# Patient Record
Sex: MALE | Race: AMERICAN INDIAN OR ALASKA NATIVE | NOT HISPANIC OR LATINO | Employment: OTHER | URBAN - METROPOLITAN AREA
[De-identification: names, ages, dates, MRNs, and addresses within clinical notes are randomized per-mention and may not be internally consistent; named-entity substitution may affect disease eponyms.]

---

## 2024-02-08 ENCOUNTER — TRANSFERRED RECORDS (OUTPATIENT)
Dept: HEALTH INFORMATION MANAGEMENT | Facility: CLINIC | Age: 74
End: 2024-02-08

## 2024-02-10 ENCOUNTER — TRANSFERRED RECORDS (OUTPATIENT)
Dept: HEALTH INFORMATION MANAGEMENT | Facility: CLINIC | Age: 74
End: 2024-02-10

## 2024-02-10 LAB — EJECTION FRACTION: NORMAL %

## 2024-02-11 ENCOUNTER — TRANSFERRED RECORDS (OUTPATIENT)
Dept: HEALTH INFORMATION MANAGEMENT | Facility: CLINIC | Age: 74
End: 2024-02-11

## 2024-02-28 ENCOUNTER — TRANSFERRED RECORDS (OUTPATIENT)
Dept: HEALTH INFORMATION MANAGEMENT | Facility: CLINIC | Age: 74
End: 2024-02-28
Payer: MEDICARE

## 2024-02-29 ENCOUNTER — TRANSFERRED RECORDS (OUTPATIENT)
Dept: HEALTH INFORMATION MANAGEMENT | Facility: CLINIC | Age: 74
End: 2024-02-29
Payer: MEDICARE

## 2024-02-29 LAB
ALT SERPL-CCNC: 13 U/L (ref 16–63)
AST SERPL-CCNC: 18 U/L (ref 15–37)
CREATININE (EXTERNAL): 0.75 MG/DL (ref 0.67–1.17)
GFR ESTIMATED (EXTERNAL): 94 ML/MIN/1.73M2
GLUCOSE (EXTERNAL): 87 MG/DL (ref 74–106)
POTASSIUM (EXTERNAL): 4.2 MMOL/L (ref 3.5–5.5)

## 2024-03-01 ENCOUNTER — TRANSFERRED RECORDS (OUTPATIENT)
Dept: HEALTH INFORMATION MANAGEMENT | Facility: CLINIC | Age: 74
End: 2024-03-01
Payer: MEDICARE

## 2024-03-02 ENCOUNTER — TRANSFERRED RECORDS (OUTPATIENT)
Dept: HEALTH INFORMATION MANAGEMENT | Facility: CLINIC | Age: 74
End: 2024-03-02
Payer: MEDICARE

## 2024-03-02 LAB — EJECTION FRACTION: NORMAL %

## 2024-03-07 ENCOUNTER — TRANSFERRED RECORDS (OUTPATIENT)
Dept: HEALTH INFORMATION MANAGEMENT | Facility: CLINIC | Age: 74
End: 2024-03-07
Payer: MEDICARE

## 2024-03-15 ENCOUNTER — DOCUMENTATION ONLY (OUTPATIENT)
Dept: OTHER | Facility: CLINIC | Age: 74
End: 2024-03-15

## 2024-03-15 ENCOUNTER — OFFICE VISIT (OUTPATIENT)
Dept: FAMILY MEDICINE | Facility: CLINIC | Age: 74
End: 2024-03-15
Payer: MEDICARE

## 2024-03-15 VITALS
OXYGEN SATURATION: 97 % | HEART RATE: 87 BPM | WEIGHT: 120.5 LBS | TEMPERATURE: 97.1 F | BODY MASS INDEX: 17.25 KG/M2 | SYSTOLIC BLOOD PRESSURE: 104 MMHG | DIASTOLIC BLOOD PRESSURE: 62 MMHG | RESPIRATION RATE: 13 BRPM | HEIGHT: 70 IN

## 2024-03-15 DIAGNOSIS — Z12.5 SCREENING FOR PROSTATE CANCER: ICD-10-CM

## 2024-03-15 DIAGNOSIS — K50.918 CROHN'S DISEASE WITH OTHER COMPLICATION, UNSPECIFIED GASTROINTESTINAL TRACT LOCATION (H): ICD-10-CM

## 2024-03-15 DIAGNOSIS — Z99.89 USE OF CANE AS AMBULATORY AID: ICD-10-CM

## 2024-03-15 DIAGNOSIS — R63.6 UNDERWEIGHT: Primary | ICD-10-CM

## 2024-03-15 DIAGNOSIS — S01.81XA FACIAL LACERATION, INITIAL ENCOUNTER: ICD-10-CM

## 2024-03-15 DIAGNOSIS — S42.92XS CLOSED FRACTURE OF LEFT SHOULDER, SEQUELA: ICD-10-CM

## 2024-03-15 DIAGNOSIS — S72.001D CLOSED FRACTURE OF RIGHT HIP WITH ROUTINE HEALING, SUBSEQUENT ENCOUNTER: ICD-10-CM

## 2024-03-15 DIAGNOSIS — R29.6 FALLS FREQUENTLY: ICD-10-CM

## 2024-03-15 DIAGNOSIS — F03.90 DEMENTIA, UNSPECIFIED DEMENTIA SEVERITY, UNSPECIFIED DEMENTIA TYPE, UNSPECIFIED WHETHER BEHAVIORAL, PSYCHOTIC, OR MOOD DISTURBANCE OR ANXIETY (H): ICD-10-CM

## 2024-03-15 DIAGNOSIS — E53.8 VITAMIN B12 DEFICIENCY (NON ANEMIC): ICD-10-CM

## 2024-03-15 DIAGNOSIS — G40.909 SEIZURE DISORDER (H): ICD-10-CM

## 2024-03-15 DIAGNOSIS — D53.9 ANEMIA, MACROCYTIC: ICD-10-CM

## 2024-03-15 LAB
BASOPHILS # BLD AUTO: 0 10E3/UL (ref 0–0.2)
BASOPHILS NFR BLD AUTO: 0 %
EOSINOPHIL # BLD AUTO: 0.1 10E3/UL (ref 0–0.7)
EOSINOPHIL NFR BLD AUTO: 1 %
ERYTHROCYTE [DISTWIDTH] IN BLOOD BY AUTOMATED COUNT: 18.5 % (ref 10–15)
HCT VFR BLD AUTO: 30.9 % (ref 40–53)
HGB BLD-MCNC: 9.5 G/DL (ref 13.3–17.7)
IMM GRANULOCYTES # BLD: 0 10E3/UL
IMM GRANULOCYTES NFR BLD: 0 %
LYMPHOCYTES # BLD AUTO: 1.9 10E3/UL (ref 0.8–5.3)
LYMPHOCYTES NFR BLD AUTO: 32 %
MCH RBC QN AUTO: 32.4 PG (ref 26.5–33)
MCHC RBC AUTO-ENTMCNC: 30.7 G/DL (ref 31.5–36.5)
MCV RBC AUTO: 106 FL (ref 78–100)
MONOCYTES # BLD AUTO: 0.4 10E3/UL (ref 0–1.3)
MONOCYTES NFR BLD AUTO: 6 %
NEUTROPHILS # BLD AUTO: 3.6 10E3/UL (ref 1.6–8.3)
NEUTROPHILS NFR BLD AUTO: 60 %
PLATELET # BLD AUTO: 415 10E3/UL (ref 150–450)
RBC # BLD AUTO: 2.93 10E6/UL (ref 4.4–5.9)
WBC # BLD AUTO: 6 10E3/UL (ref 4–11)

## 2024-03-15 PROCEDURE — 99000 SPECIMEN HANDLING OFFICE-LAB: CPT | Performed by: FAMILY MEDICINE

## 2024-03-15 PROCEDURE — 80053 COMPREHEN METABOLIC PANEL: CPT | Performed by: FAMILY MEDICINE

## 2024-03-15 PROCEDURE — 80164 ASSAY DIPROPYLACETIC ACD TOT: CPT | Mod: 90 | Performed by: FAMILY MEDICINE

## 2024-03-15 PROCEDURE — 99204 OFFICE O/P NEW MOD 45 MIN: CPT | Performed by: FAMILY MEDICINE

## 2024-03-15 PROCEDURE — 80165 DIPROPYLACETIC ACID FREE: CPT | Mod: 90 | Performed by: FAMILY MEDICINE

## 2024-03-15 PROCEDURE — 82607 VITAMIN B-12: CPT | Performed by: FAMILY MEDICINE

## 2024-03-15 PROCEDURE — 85025 COMPLETE CBC W/AUTO DIFF WBC: CPT | Performed by: FAMILY MEDICINE

## 2024-03-15 PROCEDURE — 36415 COLL VENOUS BLD VENIPUNCTURE: CPT | Performed by: FAMILY MEDICINE

## 2024-03-15 NOTE — PROGRESS NOTES
Writer performed wound cares per request from Dr. Crouch. Physical exam showing small laceration/abrasion injuries to right hand fingers. Wound cleansing performed, flexible bandages applied over wounds. Writer instructed pt family in wound cleansing and wrapping, family verbalized understanding. Wound cleansing also performed on pt right eye laceration. Physical exam showing 1.5 inch laceration on edge of right orbit. Pt tolerated wound cares without complaint, discharged home with family.   Other (Free Text): Negative quantiferon gold 12/2022 Note Text (......Xxx Chief Complaint.): This diagnosis correlates with the Render Risk Assessment In Note?: no Detail Level: Zone

## 2024-03-16 LAB
ALBUMIN SERPL BCG-MCNC: 3.5 G/DL (ref 3.5–5.2)
ALP SERPL-CCNC: 58 U/L (ref 40–150)
ALT SERPL W P-5'-P-CCNC: 17 U/L (ref 0–70)
ANION GAP SERPL CALCULATED.3IONS-SCNC: 12 MMOL/L (ref 7–15)
AST SERPL W P-5'-P-CCNC: 27 U/L (ref 0–45)
BILIRUB SERPL-MCNC: 0.3 MG/DL
BUN SERPL-MCNC: 28.8 MG/DL (ref 8–23)
CALCIUM SERPL-MCNC: 9.1 MG/DL (ref 8.8–10.2)
CHLORIDE SERPL-SCNC: 101 MMOL/L (ref 98–107)
CREAT SERPL-MCNC: 0.84 MG/DL (ref 0.67–1.17)
DEPRECATED HCO3 PLAS-SCNC: 24 MMOL/L (ref 22–29)
EGFRCR SERPLBLD CKD-EPI 2021: >90 ML/MIN/1.73M2
GLUCOSE SERPL-MCNC: 99 MG/DL (ref 70–99)
POTASSIUM SERPL-SCNC: 4.8 MMOL/L (ref 3.4–5.3)
PROT SERPL-MCNC: 7.9 G/DL (ref 6.4–8.3)
SODIUM SERPL-SCNC: 137 MMOL/L (ref 135–145)
VALPROATE FREE MFR SERPL: 24 %
VALPROATE FREE SERPL-MCNC: 11 UG/ML
VALPROATE SERPL-MCNC: 46 UG/ML
VIT B12 SERPL-MCNC: 527 PG/ML (ref 232–1245)

## 2024-03-16 NOTE — PROCEDURES
The daughter, her partner and the patient discussed the laceration just lateral to the lateral orbital ridge. It was well opposed and not actively bleeding, and appeared clean. Dermabond would allow healing without lidocaine injection and sutures and bleeding, while resulting in a similar scar.   The patient laid on the bed with his right side down and head on a pillow. The nurse cleaned the wound. Dermabond was applied to the 2 cm wound in three layers. This was well tolerated.

## 2024-03-19 ENCOUNTER — TELEPHONE (OUTPATIENT)
Dept: FAMILY MEDICINE | Facility: CLINIC | Age: 74
End: 2024-03-19
Payer: MEDICARE

## 2024-03-19 DIAGNOSIS — D53.9 ANEMIA, MACROCYTIC: Primary | ICD-10-CM

## 2024-03-19 NOTE — TELEPHONE ENCOUNTER
----- Message from Lyly Crouch MD sent at 3/18/2024  4:57 PM CDT -----  Aside from the hemoglobin, the labs are good.  Continue the current medications.    Spoke with patient daughter who was present at office visit with patient.     Rylee expressed that patient did receive a blood transfusion on March 11th or 12th however was unsure what patients previous Hemoglobin was.   Expressed that medications were reviewed and written down by Dr. Wright. Not updated on medication list at time of call.      Advised message would be sent to Dr. Crouch for review and update.

## 2024-03-20 NOTE — TELEPHONE ENCOUNTER
Attempt 1/3 LVM requesting patient return call to clinic and schedule lab only appt as advised by PCP.

## 2024-03-22 ENCOUNTER — LAB (OUTPATIENT)
Dept: LAB | Facility: CLINIC | Age: 74
End: 2024-03-22
Payer: MEDICARE

## 2024-03-22 DIAGNOSIS — D53.9 ANEMIA, MACROCYTIC: ICD-10-CM

## 2024-03-22 DIAGNOSIS — Z12.5 SCREENING FOR PROSTATE CANCER: ICD-10-CM

## 2024-03-22 DIAGNOSIS — R63.6 UNDERWEIGHT: ICD-10-CM

## 2024-03-22 LAB — HGB BLD-MCNC: 10.4 G/DL (ref 13.3–17.7)

## 2024-03-22 PROCEDURE — 82728 ASSAY OF FERRITIN: CPT

## 2024-03-22 PROCEDURE — 83550 IRON BINDING TEST: CPT

## 2024-03-22 PROCEDURE — G0103 PSA SCREENING: HCPCS

## 2024-03-22 PROCEDURE — 36415 COLL VENOUS BLD VENIPUNCTURE: CPT

## 2024-03-22 PROCEDURE — 83540 ASSAY OF IRON: CPT

## 2024-03-22 PROCEDURE — 85018 HEMOGLOBIN: CPT

## 2024-03-23 LAB
FERRITIN SERPL-MCNC: 535 NG/ML (ref 31–409)
IRON BINDING CAPACITY (ROCHE): 246 UG/DL (ref 240–430)
IRON SATN MFR SERPL: 17 % (ref 15–46)
IRON SERPL-MCNC: 42 UG/DL (ref 61–157)
PSA SERPL DL<=0.01 NG/ML-MCNC: 0.46 NG/ML (ref 0–6.5)

## 2024-03-25 ENCOUNTER — TELEPHONE (OUTPATIENT)
Dept: VASCULAR SURGERY | Facility: CLINIC | Age: 74
End: 2024-03-25
Payer: MEDICARE

## 2024-03-25 DIAGNOSIS — I71.40 AAA (ABDOMINAL AORTIC ANEURYSM) (H): Primary | ICD-10-CM

## 2024-03-25 NOTE — TELEPHONE ENCOUNTER
Vascular Clinic Appointment Request    Imaging needed? Yes. Orders placed.     Visit type: In-person    Provider: Dr. 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    LALY SanchezN, RN  RN Care Coordinator  [facility] phone: [phone]  Fax: [phone]

## 2024-03-25 NOTE — TELEPHONE ENCOUNTER
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

## 2024-03-27 ENCOUNTER — DOCUMENTATION ONLY (OUTPATIENT)
Dept: VASCULAR SURGERY | Facility: CLINIC | Age: 74
End: 2024-03-27
Payer: MEDICARE

## 2024-03-27 NOTE — PROGRESS NOTES
Action 3.27.24    Action Taken Per inindra from [facility]- hospitals called [facility] in [place] for fax number to send imaging request. The number they gave is [phone]. hospitals faxed imaging request and priority overnight shipping label.  Tracking # [id number]      Action 04.01.2024    Action Taken Image received from [facility] via disc, took to 4n to be uploaded to chart.      Action 04/02/24  TARAN 10:33 AM    Action Taken  Imaging disc uploaded. CT CHEST 2/11/24 - unable to upload. Message sent to hospitals.

## 2024-04-02 NOTE — TELEPHONE ENCOUNTER
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

## 2024-04-03 ENCOUNTER — TELEPHONE (OUTPATIENT)
Dept: FAMILY MEDICINE | Facility: CLINIC | Age: 74
End: 2024-04-03
Payer: MEDICARE

## 2024-04-03 DIAGNOSIS — R63.6 UNDERWEIGHT: Primary | ICD-10-CM

## 2024-04-03 DIAGNOSIS — G40.909 SEIZURE DISORDER (H): ICD-10-CM

## 2024-04-03 DIAGNOSIS — R29.6 FALLS FREQUENTLY: ICD-10-CM

## 2024-04-03 RX ORDER — MIDODRINE HYDROCHLORIDE 5 MG/1
15 TABLET ORAL 3 TIMES DAILY
Qty: 270 TABLET | Refills: 11 | Status: ON HOLD | OUTPATIENT
Start: 2024-04-03 | End: 2024-04-11

## 2024-04-03 RX ORDER — ASCORBIC ACID 500 MG
500 TABLET ORAL DAILY
Qty: 90 TABLET | Refills: 1 | Status: SHIPPED | OUTPATIENT
Start: 2024-04-03 | End: 2024-09-08

## 2024-04-03 RX ORDER — LANOLIN ALCOHOL/MO/W.PET/CERES
100 CREAM (GRAM) TOPICAL DAILY
Qty: 90 TABLET | Refills: 3 | Status: SHIPPED | OUTPATIENT
Start: 2024-04-03

## 2024-04-03 RX ORDER — FAMOTIDINE 20 MG
1000 TABLET ORAL DAILY
Qty: 90 CAPSULE | Refills: 3 | Status: SHIPPED | OUTPATIENT
Start: 2024-04-03 | End: 2026-09-20

## 2024-04-03 RX ORDER — DIVALPROEX SODIUM 500 MG/1
500 TABLET, DELAYED RELEASE ORAL 2 TIMES DAILY
Qty: 180 TABLET | Refills: 3 | Status: SHIPPED | OUTPATIENT
Start: 2024-04-03 | End: 2024-04-07

## 2024-04-03 NOTE — TELEPHONE ENCOUNTER
Spoke with daughter to relay that medications have been sent in for refills. Daughter stated that pt is not taking methotrexate. She also wanted to ensure that the medication for the pt's low blood pressure was included in the refills. She was not sure what the medication was called though.

## 2024-04-03 NOTE — TELEPHONE ENCOUNTER
Medication Question or Refill    Contacts         Type Contact Phone/Fax    04/03/2024 12:09 PM CDT Phone (Incoming) Rylee Larios (Emergency Contact) [phone]            What medication are you calling about (include dose and sig)?: Every medication pts daughter gave to provider at last appt.     Preferred Pharmacy:   [id number] - [place], [place] - [address] [facility] [address]  [address]  [place] [address]  Phone: [phone] Fax: [phone]      Controlled Substance Agreement on file:   CSA -- Patient Level:    CSA: None found at the patient level.       Who prescribed the medication?: Previous providers     Do you need a refill? Yes    When did you use the medication last? Unknown     Patient offered an appointment? No    Do you have any questions or concerns?  Pts daughter states that she dropped off a lot of paperwork for pt when they came to last appt. How ever I do not see none of these records updated in our system. Pts daughter included a med list as well and all those need to be refilled       Okay to leave a detailed message?: No at Cell number on file:    Telephone Information:   Mobile [phone]

## 2024-04-06 ENCOUNTER — NURSE TRIAGE (OUTPATIENT)
Dept: NURSING | Facility: CLINIC | Age: 74
End: 2024-04-06
Payer: MEDICARE

## 2024-04-06 NOTE — TELEPHONE ENCOUNTER
Received call from patient stating that they just moved here from [place] and had their first visit with Dr. Shea. He states PCP sent prescriptions over to [facility], but they couldn't find his part D insurance. They applied for it again but don't have it at this time, and he'll be out of the medication this weekend. He is asking if a provider will give an emergency refill of a few pills. Advised him to call pharmacy and ask them to give a few pills of the prescriptions sent to get him through the weekend while they work on his insurance. Also advised him to check GoodRx. Patient had no further questions and plans to follow advice.    Reason for Disposition   Caller has medicine question only, adult not sick, AND triager answers question    Protocols used: Medication Question Call-A-

## 2024-04-07 ENCOUNTER — APPOINTMENT (OUTPATIENT)
Dept: CT IMAGING | Facility: CLINIC | Age: 74
DRG: 871 | End: 2024-04-07
Attending: EMERGENCY MEDICINE
Payer: MEDICARE

## 2024-04-07 ENCOUNTER — HOSPITAL ENCOUNTER (INPATIENT)
Facility: CLINIC | Age: 74
LOS: 4 days | Discharge: HOME HEALTH | DRG: 871 | End: 2024-04-11
Attending: EMERGENCY MEDICINE | Admitting: INTERNAL MEDICINE
Payer: MEDICARE

## 2024-04-07 ENCOUNTER — APPOINTMENT (OUTPATIENT)
Dept: GENERAL RADIOLOGY | Facility: CLINIC | Age: 74
DRG: 871 | End: 2024-04-07
Attending: EMERGENCY MEDICINE
Payer: MEDICARE

## 2024-04-07 ENCOUNTER — APPOINTMENT (OUTPATIENT)
Dept: CT IMAGING | Facility: CLINIC | Age: 74
DRG: 871 | End: 2024-04-07
Attending: PHYSICIAN ASSISTANT
Payer: MEDICARE

## 2024-04-07 DIAGNOSIS — I25.10 CORONARY ARTERY DISEASE INVOLVING NATIVE HEART WITHOUT ANGINA PECTORIS, UNSPECIFIED VESSEL OR LESION TYPE: ICD-10-CM

## 2024-04-07 DIAGNOSIS — R06.02 SHORTNESS OF BREATH: ICD-10-CM

## 2024-04-07 DIAGNOSIS — R41.0 CONFUSION: ICD-10-CM

## 2024-04-07 DIAGNOSIS — R65.10 SIRS (SYSTEMIC INFLAMMATORY RESPONSE SYNDROME) (H): ICD-10-CM

## 2024-04-07 DIAGNOSIS — G40.909 NONINTRACTABLE EPILEPSY WITHOUT STATUS EPILEPTICUS, UNSPECIFIED EPILEPSY TYPE (H): ICD-10-CM

## 2024-04-07 DIAGNOSIS — R29.6 FALLS FREQUENTLY: ICD-10-CM

## 2024-04-07 DIAGNOSIS — I24.89 DEMAND ISCHEMIA (H): Primary | ICD-10-CM

## 2024-04-07 DIAGNOSIS — R53.1 WEAKNESS: ICD-10-CM

## 2024-04-07 DIAGNOSIS — R63.6 UNDERWEIGHT: ICD-10-CM

## 2024-04-07 DIAGNOSIS — G40.909 SEIZURE DISORDER (H): ICD-10-CM

## 2024-04-07 DIAGNOSIS — J18.9 PNEUMONIA DUE TO INFECTIOUS ORGANISM, UNSPECIFIED LATERALITY, UNSPECIFIED PART OF LUNG: ICD-10-CM

## 2024-04-07 LAB
ABO/RH(D): NORMAL
ALBUMIN SERPL BCG-MCNC: 3.4 G/DL (ref 3.5–5.2)
ALBUMIN UR-MCNC: NEGATIVE MG/DL
ALP SERPL-CCNC: 64 U/L (ref 40–150)
ALT SERPL W P-5'-P-CCNC: 17 U/L (ref 0–70)
ANION GAP SERPL CALCULATED.3IONS-SCNC: 9 MMOL/L (ref 7–15)
ANTIBODY SCREEN: NEGATIVE
APPEARANCE UR: CLEAR
AST SERPL W P-5'-P-CCNC: 32 U/L (ref 0–45)
BASE EXCESS BLDV CALC-SCNC: -0.5 MMOL/L (ref -3–3)
BASOPHILS # BLD AUTO: 0 10E3/UL (ref 0–0.2)
BASOPHILS NFR BLD AUTO: 0 %
BILIRUB SERPL-MCNC: 0.5 MG/DL
BILIRUB UR QL STRIP: NEGATIVE
BUN SERPL-MCNC: 19.5 MG/DL (ref 8–23)
CALCIUM SERPL-MCNC: 8.4 MG/DL (ref 8.8–10.2)
CHLORIDE SERPL-SCNC: 107 MMOL/L (ref 98–107)
COLOR UR AUTO: ABNORMAL
CREAT SERPL-MCNC: 0.68 MG/DL (ref 0.67–1.17)
DEPRECATED HCO3 PLAS-SCNC: 21 MMOL/L (ref 22–29)
EGFRCR SERPLBLD CKD-EPI 2021: >90 ML/MIN/1.73M2
EOSINOPHIL # BLD AUTO: 0 10E3/UL (ref 0–0.7)
EOSINOPHIL NFR BLD AUTO: 0 %
ERYTHROCYTE [DISTWIDTH] IN BLOOD BY AUTOMATED COUNT: 16.5 % (ref 10–15)
FLUAV RNA SPEC QL NAA+PROBE: NEGATIVE
FLUBV RNA RESP QL NAA+PROBE: NEGATIVE
GLUCOSE SERPL-MCNC: 114 MG/DL (ref 70–99)
GLUCOSE UR STRIP-MCNC: NEGATIVE MG/DL
HCO3 BLDV-SCNC: 24 MMOL/L (ref 21–28)
HCT VFR BLD AUTO: 28.9 % (ref 40–53)
HGB BLD-MCNC: 9.1 G/DL (ref 13.3–17.7)
HGB UR QL STRIP: ABNORMAL
IMM GRANULOCYTES # BLD: 0.1 10E3/UL
IMM GRANULOCYTES NFR BLD: 0 %
KETONES UR STRIP-MCNC: NEGATIVE MG/DL
LACTATE SERPL-SCNC: 1.8 MMOL/L (ref 0.7–2)
LEUKOCYTE ESTERASE UR QL STRIP: NEGATIVE
LYMPHOCYTES # BLD AUTO: 1.6 10E3/UL (ref 0.8–5.3)
LYMPHOCYTES NFR BLD AUTO: 11 %
MAGNESIUM SERPL-MCNC: 2 MG/DL (ref 1.7–2.3)
MCH RBC QN AUTO: 33.7 PG (ref 26.5–33)
MCHC RBC AUTO-ENTMCNC: 31.5 G/DL (ref 31.5–36.5)
MCV RBC AUTO: 107 FL (ref 78–100)
MONOCYTES # BLD AUTO: 0.9 10E3/UL (ref 0–1.3)
MONOCYTES NFR BLD AUTO: 6 %
MUCOUS THREADS #/AREA URNS LPF: PRESENT /LPF
NEUTROPHILS # BLD AUTO: 11.7 10E3/UL (ref 1.6–8.3)
NEUTROPHILS NFR BLD AUTO: 83 %
NITRATE UR QL: NEGATIVE
NRBC # BLD AUTO: 0 10E3/UL
NRBC BLD AUTO-RTO: 0 /100
NT-PROBNP SERPL-MCNC: 8130 PG/ML (ref 0–900)
O2/TOTAL GAS SETTING VFR VENT: 21 %
OXYHGB MFR BLDV: 60 % (ref 70–75)
PCO2 BLDV: 37 MM HG (ref 40–50)
PH BLDV: 7.42 [PH] (ref 7.32–7.43)
PH UR STRIP: 5.5 [PH] (ref 5–7)
PLATELET # BLD AUTO: 266 10E3/UL (ref 150–450)
PO2 BLDV: 33 MM HG (ref 25–47)
POTASSIUM SERPL-SCNC: 4.1 MMOL/L (ref 3.4–5.3)
PROCALCITONIN SERPL IA-MCNC: 0.71 NG/ML
PROT SERPL-MCNC: 6.7 G/DL (ref 6.4–8.3)
RBC # BLD AUTO: 2.7 10E6/UL (ref 4.4–5.9)
RBC URINE: 1 /HPF
RSV RNA SPEC NAA+PROBE: NEGATIVE
SAO2 % BLDV: 61.6 % (ref 70–75)
SARS-COV-2 RNA RESP QL NAA+PROBE: NEGATIVE
SODIUM SERPL-SCNC: 137 MMOL/L (ref 135–145)
SP GR UR STRIP: 1.01 (ref 1–1.03)
SPECIMEN EXPIRATION DATE: NORMAL
SQUAMOUS EPITHELIAL: <1 /HPF
TROPONIN T SERPL HS-MCNC: 149 NG/L
TROPONIN T SERPL HS-MCNC: 202 NG/L
UROBILINOGEN UR STRIP-MCNC: NORMAL MG/DL
WBC # BLD AUTO: 14.3 10E3/UL (ref 4–11)
WBC URINE: 1 /HPF

## 2024-04-07 PROCEDURE — 83880 ASSAY OF NATRIURETIC PEPTIDE: CPT | Performed by: EMERGENCY MEDICINE

## 2024-04-07 PROCEDURE — 250N000011 HC RX IP 250 OP 636: Performed by: EMERGENCY MEDICINE

## 2024-04-07 PROCEDURE — 250N000011 HC RX IP 250 OP 636: Mod: JZ | Performed by: PHYSICIAN ASSISTANT

## 2024-04-07 PROCEDURE — 93010 ELECTROCARDIOGRAM REPORT: CPT | Performed by: EMERGENCY MEDICINE

## 2024-04-07 PROCEDURE — 87040 BLOOD CULTURE FOR BACTERIA: CPT | Performed by: EMERGENCY MEDICINE

## 2024-04-07 PROCEDURE — 83605 ASSAY OF LACTIC ACID: CPT | Performed by: EMERGENCY MEDICINE

## 2024-04-07 PROCEDURE — 96374 THER/PROPH/DIAG INJ IV PUSH: CPT | Performed by: EMERGENCY MEDICINE

## 2024-04-07 PROCEDURE — G1010 CDSM STANSON: HCPCS | Performed by: RADIOLOGY

## 2024-04-07 PROCEDURE — 93005 ELECTROCARDIOGRAM TRACING: CPT | Performed by: EMERGENCY MEDICINE

## 2024-04-07 PROCEDURE — 99207 PR APP CREDIT; MD BILLING SHARED VISIT: CPT | Performed by: INTERNAL MEDICINE

## 2024-04-07 PROCEDURE — 80053 COMPREHEN METABOLIC PANEL: CPT | Performed by: EMERGENCY MEDICINE

## 2024-04-07 PROCEDURE — 72125 CT NECK SPINE W/O DYE: CPT | Mod: 26 | Performed by: RADIOLOGY

## 2024-04-07 PROCEDURE — 85025 COMPLETE CBC W/AUTO DIFF WBC: CPT | Performed by: EMERGENCY MEDICINE

## 2024-04-07 PROCEDURE — 99285 EMERGENCY DEPT VISIT HI MDM: CPT | Mod: 25 | Performed by: EMERGENCY MEDICINE

## 2024-04-07 PROCEDURE — 71250 CT THORAX DX C-: CPT | Mod: MG

## 2024-04-07 PROCEDURE — 96361 HYDRATE IV INFUSION ADD-ON: CPT | Performed by: EMERGENCY MEDICINE

## 2024-04-07 PROCEDURE — 84145 PROCALCITONIN (PCT): CPT | Performed by: EMERGENCY MEDICINE

## 2024-04-07 PROCEDURE — 86900 BLOOD TYPING SEROLOGIC ABO: CPT | Performed by: EMERGENCY MEDICINE

## 2024-04-07 PROCEDURE — G1010 CDSM STANSON: HCPCS

## 2024-04-07 PROCEDURE — 99418 PROLNG IP/OBS E/M EA 15 MIN: CPT | Mod: FS | Performed by: PHYSICIAN ASSISTANT

## 2024-04-07 PROCEDURE — 71046 X-RAY EXAM CHEST 2 VIEWS: CPT | Mod: 26 | Performed by: RADIOLOGY

## 2024-04-07 PROCEDURE — 71250 CT THORAX DX C-: CPT | Mod: 26 | Performed by: RADIOLOGY

## 2024-04-07 PROCEDURE — 84484 ASSAY OF TROPONIN QUANT: CPT | Performed by: EMERGENCY MEDICINE

## 2024-04-07 PROCEDURE — 36415 COLL VENOUS BLD VENIPUNCTURE: CPT | Performed by: PHYSICIAN ASSISTANT

## 2024-04-07 PROCEDURE — 120N000003 HC R&B IMCU UMMC

## 2024-04-07 PROCEDURE — 81001 URINALYSIS AUTO W/SCOPE: CPT | Performed by: EMERGENCY MEDICINE

## 2024-04-07 PROCEDURE — 258N000003 HC RX IP 258 OP 636: Performed by: EMERGENCY MEDICINE

## 2024-04-07 PROCEDURE — 82805 BLOOD GASES W/O2 SATURATION: CPT | Performed by: EMERGENCY MEDICINE

## 2024-04-07 PROCEDURE — 84484 ASSAY OF TROPONIN QUANT: CPT | Performed by: PHYSICIAN ASSISTANT

## 2024-04-07 PROCEDURE — 36415 COLL VENOUS BLD VENIPUNCTURE: CPT | Performed by: EMERGENCY MEDICINE

## 2024-04-07 PROCEDURE — 70450 CT HEAD/BRAIN W/O DYE: CPT | Mod: MG

## 2024-04-07 PROCEDURE — 83735 ASSAY OF MAGNESIUM: CPT | Performed by: EMERGENCY MEDICINE

## 2024-04-07 PROCEDURE — 99223 1ST HOSP IP/OBS HIGH 75: CPT | Mod: FS | Performed by: PHYSICIAN ASSISTANT

## 2024-04-07 PROCEDURE — 70450 CT HEAD/BRAIN W/O DYE: CPT | Mod: 26 | Performed by: RADIOLOGY

## 2024-04-07 PROCEDURE — 258N000003 HC RX IP 258 OP 636: Performed by: PHYSICIAN ASSISTANT

## 2024-04-07 PROCEDURE — 87637 SARSCOV2&INF A&B&RSV AMP PRB: CPT | Performed by: EMERGENCY MEDICINE

## 2024-04-07 PROCEDURE — 71046 X-RAY EXAM CHEST 2 VIEWS: CPT

## 2024-04-07 RX ORDER — ACETAMINOPHEN 325 MG/1
650 TABLET ORAL EVERY 6 HOURS PRN
Status: DISCONTINUED | OUTPATIENT
Start: 2024-04-07 | End: 2024-04-11 | Stop reason: HOSPADM

## 2024-04-07 RX ORDER — CEFTRIAXONE 2 G/1
2 INJECTION, POWDER, FOR SOLUTION INTRAMUSCULAR; INTRAVENOUS ONCE
Status: DISCONTINUED | OUTPATIENT
Start: 2024-04-07 | End: 2024-04-07

## 2024-04-07 RX ORDER — PIPERACILLIN SODIUM, TAZOBACTAM SODIUM 4; .5 G/20ML; G/20ML
4.5 INJECTION, POWDER, LYOPHILIZED, FOR SOLUTION INTRAVENOUS EVERY 6 HOURS
Status: DISCONTINUED | OUTPATIENT
Start: 2024-04-08 | End: 2024-04-10

## 2024-04-07 RX ORDER — POLYETHYLENE GLYCOL 3350 17 G/17G
17 POWDER, FOR SOLUTION ORAL DAILY
Status: DISCONTINUED | OUTPATIENT
Start: 2024-04-08 | End: 2024-04-11 | Stop reason: HOSPADM

## 2024-04-07 RX ORDER — LEVETIRACETAM 750 MG/1
750 TABLET ORAL 2 TIMES DAILY
Status: DISCONTINUED | OUTPATIENT
Start: 2024-04-08 | End: 2024-04-09

## 2024-04-07 RX ORDER — MIDODRINE HYDROCHLORIDE 5 MG/1
10 TABLET ORAL 3 TIMES DAILY
Status: DISCONTINUED | OUTPATIENT
Start: 2024-04-08 | End: 2024-04-11 | Stop reason: HOSPADM

## 2024-04-07 RX ORDER — ENOXAPARIN SODIUM 100 MG/ML
40 INJECTION SUBCUTANEOUS EVERY 24 HOURS
Status: DISCONTINUED | OUTPATIENT
Start: 2024-04-07 | End: 2024-04-07

## 2024-04-07 RX ORDER — FERROUS SULFATE 325(65) MG
325 TABLET ORAL DAILY
Status: DISCONTINUED | OUTPATIENT
Start: 2024-04-08 | End: 2024-04-11 | Stop reason: HOSPADM

## 2024-04-07 RX ORDER — FUROSEMIDE 10 MG/ML
40 INJECTION INTRAMUSCULAR; INTRAVENOUS ONCE
Status: COMPLETED | OUTPATIENT
Start: 2024-04-07 | End: 2024-04-07

## 2024-04-07 RX ORDER — AMOXICILLIN 250 MG
2 CAPSULE ORAL 2 TIMES DAILY PRN
Status: DISCONTINUED | OUTPATIENT
Start: 2024-04-07 | End: 2024-04-11 | Stop reason: HOSPADM

## 2024-04-07 RX ORDER — LIDOCAINE 40 MG/G
CREAM TOPICAL
Status: DISCONTINUED | OUTPATIENT
Start: 2024-04-07 | End: 2024-04-11 | Stop reason: HOSPADM

## 2024-04-07 RX ORDER — AMOXICILLIN 250 MG
1 CAPSULE ORAL AT BEDTIME
Status: DISCONTINUED | OUTPATIENT
Start: 2024-04-07 | End: 2024-04-11 | Stop reason: HOSPADM

## 2024-04-07 RX ORDER — AMOXICILLIN 250 MG
1 CAPSULE ORAL 2 TIMES DAILY PRN
Status: DISCONTINUED | OUTPATIENT
Start: 2024-04-07 | End: 2024-04-11 | Stop reason: HOSPADM

## 2024-04-07 RX ORDER — CEFEPIME HYDROCHLORIDE 2 G/1
2 INJECTION, POWDER, FOR SOLUTION INTRAVENOUS ONCE
Status: COMPLETED | OUTPATIENT
Start: 2024-04-07 | End: 2024-04-07

## 2024-04-07 RX ORDER — DEXAMETHASONE SODIUM PHOSPHATE 10 MG/ML
10 INJECTION, SOLUTION INTRAMUSCULAR; INTRAVENOUS ONCE
Status: COMPLETED | OUTPATIENT
Start: 2024-04-07 | End: 2024-04-07

## 2024-04-07 RX ORDER — MULTIPLE VITAMINS W/ MINERALS TAB 9MG-400MCG
1 TAB ORAL DAILY
Status: DISCONTINUED | OUTPATIENT
Start: 2024-04-08 | End: 2024-04-11 | Stop reason: HOSPADM

## 2024-04-07 RX ORDER — ENOXAPARIN SODIUM 100 MG/ML
30 INJECTION SUBCUTANEOUS EVERY 24 HOURS
Status: DISCONTINUED | OUTPATIENT
Start: 2024-04-07 | End: 2024-04-11 | Stop reason: HOSPADM

## 2024-04-07 RX ADMIN — FUROSEMIDE 40 MG: 10 INJECTION, SOLUTION INTRAVENOUS at 18:05

## 2024-04-07 RX ADMIN — LEVETIRACETAM 2000 MG: 100 INJECTION, SOLUTION INTRAVENOUS at 22:41

## 2024-04-07 RX ADMIN — CEFEPIME HYDROCHLORIDE 2 G: 2 INJECTION, POWDER, FOR SOLUTION INTRAVENOUS at 20:06

## 2024-04-07 RX ADMIN — DEXAMETHASONE SODIUM PHOSPHATE 10 MG: 10 INJECTION, SOLUTION INTRAMUSCULAR; INTRAVENOUS at 18:38

## 2024-04-07 RX ADMIN — SODIUM CHLORIDE 1000 ML: 9 INJECTION, SOLUTION INTRAVENOUS at 17:03

## 2024-04-07 RX ADMIN — VANCOMYCIN HYDROCHLORIDE 1250 MG: 10 INJECTION, POWDER, LYOPHILIZED, FOR SOLUTION INTRAVENOUS at 20:39

## 2024-04-07 ASSESSMENT — ACTIVITIES OF DAILY LIVING (ADL)
ADLS_ACUITY_SCORE: 35

## 2024-04-07 ASSESSMENT — COLUMBIA-SUICIDE SEVERITY RATING SCALE - C-SSRS
6. HAVE YOU EVER DONE ANYTHING, STARTED TO DO ANYTHING, OR PREPARED TO DO ANYTHING TO END YOUR LIFE?: NO
2. HAVE YOU ACTUALLY HAD ANY THOUGHTS OF KILLING YOURSELF IN THE PAST MONTH?: NO
1. IN THE PAST MONTH, HAVE YOU WISHED YOU WERE DEAD OR WISHED YOU COULD GO TO SLEEP AND NOT WAKE UP?: NO

## 2024-04-07 NOTE — ED TRIAGE NOTES
Pt BIBA from home for altered mental status and seizures. Found at home by son in law. . On 8 L non rebreather, none at baseline.

## 2024-04-07 NOTE — ED PROVIDER NOTES
"  ED PROVIDER NOTE  April 7, 2024  History     Chief Complaint   Patient presents with    Seizures    Altered Mental Status     HPI  Trevor Larios is a 73 year old male who has a past medical history per chart review significant for seizure disorder and Crohn's disease arriving today to the emergency department via EMS from home due to concern of possible seizure and hypoxia.  Per EMS report the patient lives by himself in an apartment.  He was not responding to messages and a brother-in-law went to check on the patient noted to be altered with mentation.  EMS were called and noted the patient to be hypoxic with initial SpO2 in the 60s.  He was placed on a nonrebreather requiring 8 L in transportation.  There was some concern for persistent altered mentation per EMS.  They reported initial blood pressure in the 80s however improved without intervention to the low 100s.  Patient otherwise provided no contributing history.  Upon arrival patient has mild confusion.  He believes he may have had a fall yesterday.  He is unsure if he had a seizure but reported this to his brother-in-law.  There is reports at this time no severe headache.  He reports that he has a weak and this is generalized.  No difficulty speaking per patient.  He denies any ongoing abdominal pain, sensation nausea or knowledge of recent melena, hematochezia, diarrhea or other concern.  Denies recent medication change.  He otherwise is a fairly poor historian.      Past Medical History  Past Medical History:   Diagnosis Date    Crohn's disease (H)     Dementia (H) 2023    \"moderate.\"     Past Surgical History:   Procedure Laterality Date    ORIF HIP FRACTURE  2022    SHOULDER SURGERY Right     2022?     divalproex sodium delayed-release (DEPAKOTE) 500 MG DR tablet  midodrine (PROAMATINE) 5 MG tablet  thiamine (B-1) 100 MG tablet  vitamin C (ASCORBIC ACID) 500 MG tablet  Vitamin D, Cholecalciferol, 25 MCG (1000 UT) CAPS      No Known Allergies  Family " History  No family history on file.  Social History   Social History     Tobacco Use    Smoking status: Never    Smokeless tobacco: Never   Vaping Use    Vaping Use: Never used         A medically appropriate review of systems was performed with pertinent positives and negatives noted in the HPI, and all other systems negative.      Physical Exam   BP: 104/59  Pulse: 97  Temp: 99.9  F (37.7  C)  Resp: 26  SpO2: 94 %      Physical Exam  Vitals and nursing note reviewed.   Constitutional:       General: He is not in acute distress.     Appearance: Normal appearance. He is not ill-appearing, toxic-appearing or diaphoretic.   HENT:      Head: Normocephalic and atraumatic.      Right Ear: External ear normal.      Left Ear: External ear normal.      Nose: Nose normal. No congestion.      Mouth/Throat:      Mouth: Mucous membranes are moist.      Pharynx: Oropharynx is clear. No oropharyngeal exudate.   Eyes:      Extraocular Movements: Extraocular movements intact.      Conjunctiva/sclera: Conjunctivae normal.      Pupils: Pupils are equal, round, and reactive to light.   Cardiovascular:      Rate and Rhythm: Regular rhythm. Tachycardia present.      Pulses: Normal pulses.      Heart sounds: Normal heart sounds. No murmur heard.     No friction rub.   Pulmonary:      Effort: Pulmonary effort is normal. No respiratory distress.      Breath sounds: No stridor. Rales present. No wheezing or rhonchi.   Abdominal:      General: Abdomen is flat. There is no distension.      Tenderness: There is no abdominal tenderness. There is no guarding or rebound.   Musculoskeletal:         General: No deformity or signs of injury. Normal range of motion.      Cervical back: Normal range of motion.   Skin:     General: Skin is warm.      Capillary Refill: Capillary refill takes less than 2 seconds.      Coloration: Skin is not pale.      Findings: No bruising or erythema.   Neurological:      General: No focal deficit present.      Mental  Status: He is confused.      GCS: GCS eye subscore is 4.      Cranial Nerves: No cranial nerve deficit.      Motor: No weakness.   Psychiatric:         Mood and Affect: Mood normal.         Behavior: Behavior normal.         ED Course        Procedures         Results for orders placed or performed during the hospital encounter of 04/07/24 (from the past 24 hour(s))   EKG 12-lead, tracing only   Result Value Ref Range    Systolic Blood Pressure  mmHg    Diastolic Blood Pressure  mmHg    Ventricular Rate 96 BPM    Atrial Rate 96 BPM    ND Interval 164 ms    QRS Duration 90 ms     ms    QTc 485 ms    P Axis 56 degrees    R AXIS 20 degrees    T Axis 64 degrees    Interpretation ECG       Sinus rhythm with sinus arrhythmia with Fusion complexes  Prolonged QT  Abnormal ECG  Unconfirmed report - interpretation of this ECG is computer generated - see medical record for final interpretation  Confirmed by - EMERGENCY ROOM, PHYSICIAN ([id number]),  JERONIMO GARCIA ([id number]) on 4/8/2024 6:41:49 AM     XR Chest 2 Views    Narrative    EXAM: XR CHEST 2 VIEWS  4/7/2024 4:39 PM     HISTORY:  SOB, hypoxia       COMPARISON:  Outside CT, 2/11/2024    FINDINGS:   AP and lateral views of the chest. Left shoulder arthroplasty.    Trachea is midline. Borderline cardiomegaly. Bilateral diffuse  airspace opacities. No significant pleural effusion. No appreciable  pneumothorax No acute osseous abnormality. Visualized upper abdomen is  unremarkable.        Impression    IMPRESSION: Bilateral diffuse airspace opacities. Differentials  include pulmonary edema versus exacerbation of pulmonary fibrosis seen  on prior CT.    I have personally reviewed the examination and initial interpretation  and I agree with the findings.    DUSTY RODRIGUEZ MD         SYSTEM ID:  [id number]   CBC with platelets differential    Narrative    The following orders were created for panel order CBC with platelets differential.  Procedure                                Abnormality         Status                     ---------                               -----------         ------                     CBC with platelets and d...[[id number]]  Abnormal            Final result                 Please view results for these tests on the individual orders.   Blood Culture Hand, Left    Specimen: Hand, Left; Blood   Result Value Ref Range    Culture No growth after 12 hours    ABO/Rh type and screen    Narrative    The following orders were created for panel order ABO/Rh type and screen.  Procedure                               Abnormality         Status                     ---------                               -----------         ------                     Adult Type and Screen[[id number]]                            Final result                 Please view results for these tests on the individual orders.   Comprehensive metabolic panel   Result Value Ref Range    Sodium 137 135 - 145 mmol/L    Potassium 4.1 3.4 - 5.3 mmol/L    Carbon Dioxide (CO2) 21 (L) 22 - 29 mmol/L    Anion Gap 9 7 - 15 mmol/L    Urea Nitrogen 19.5 8.0 - 23.0 mg/dL    Creatinine 0.68 0.67 - 1.17 mg/dL    GFR Estimate >90 >60 mL/min/1.73m2    Calcium 8.4 (L) 8.8 - 10.2 mg/dL    Chloride 107 98 - 107 mmol/L    Glucose 114 (H) 70 - 99 mg/dL    Alkaline Phosphatase 64 40 - 150 U/L    AST 32 0 - 45 U/L    ALT 17 0 - 70 U/L    Protein Total 6.7 6.4 - 8.3 g/dL    Albumin 3.4 (L) 3.5 - 5.2 g/dL    Bilirubin Total 0.5 <=1.2 mg/dL   Lactic acid whole blood   Result Value Ref Range    Lactic Acid 1.8 0.7 - 2.0 mmol/L   Procalcitonin   Result Value Ref Range    Procalcitonin 0.71 (H) <0.50 ng/mL   Troponin T, High Sensitivity   Result Value Ref Range    Troponin T, High Sensitivity 149 (HH) <=22 ng/L   Magnesium   Result Value Ref Range    Magnesium 2.0 1.7 - 2.3 mg/dL   Nt probnp inpatient (BNP)   Result Value Ref Range    N terminal Pro BNP Inpatient 8,130 (H) 0 - 900 pg/mL   CBC with platelets and differential   Result  Value Ref Range    WBC Count 14.3 (H) 4.0 - 11.0 10e3/uL    RBC Count 2.70 (L) 4.40 - 5.90 10e6/uL    Hemoglobin 9.1 (L) 13.3 - 17.7 g/dL    Hematocrit 28.9 (L) 40.0 - 53.0 %     (H) 78 - 100 fL    MCH 33.7 (H) 26.5 - 33.0 pg    MCHC 31.5 31.5 - 36.5 g/dL    RDW 16.5 (H) 10.0 - 15.0 %    Platelet Count 266 150 - 450 10e3/uL    % Neutrophils 83 %    % Lymphocytes 11 %    % Monocytes 6 %    % Eosinophils 0 %    % Basophils 0 %    % Immature Granulocytes 0 %    NRBCs per 100 WBC 0 <1 /100    Absolute Neutrophils 11.7 (H) 1.6 - 8.3 10e3/uL    Absolute Lymphocytes 1.6 0.8 - 5.3 10e3/uL    Absolute Monocytes 0.9 0.0 - 1.3 10e3/uL    Absolute Eosinophils 0.0 0.0 - 0.7 10e3/uL    Absolute Basophils 0.0 0.0 - 0.2 10e3/uL    Absolute Immature Granulocytes 0.1 <=0.4 10e3/uL    Absolute NRBCs 0.0 10e3/uL   Adult Type and Screen   Result Value Ref Range    ABO/RH(D) O POS     Antibody Screen Negative Negative    SPECIMEN EXPIRATION DATE [id number]    Chest CT w/o contrast    Narrative    EXAMINATION: Chest CT  4/7/2024 6:27 PM    CLINICAL HISTORY: Fall; trauma; hypoxia; R mid-lateral back pain    COMPARISON: Outside CT, 2/11/2024.    TECHNIQUE: CT imaging obtained through the chest without contrast.  Axial, coronal, and sagittal reconstructions and axial MIP reformatted  images are reviewed.     CONTRAST: No contrast    FINDINGS:  Lungs: The trachea and central airways are patent. No pneumothorax or  pleural effusion. Extensive groundglass opacities with thickening of  the interlobular septa throughout the lungs, predominantly in the  bilateral lower lobes. Extensive subpleural cyst formation in the  upper lobes without honeycombing. Partially calcified pleural  thickening in the left apex.    Mediastinum: Anemic blood pool. The visualized thyroid gland is  unremarkable. Mild cardiomegaly. No pericardial effusion. Mild to  moderate coronary artery calcification. The ascending aorta and main  pulmonary artery diameters  are within normal limits. Normal appearance  and configuration of the great vessels off of the aortic arch. No  suspicious mediastinal, hilar, or axillary lymph nodes.    Bones and soft tissues: No suspicious bone findings.     Upper Abdomen: Saccular outpouching from the celiac trunk/aorta  measuring up to 4.5 x 3.6 cm. Left renal simple cyst.      Impression    IMPRESSION:   1. Bilateral lower lobe predominant ground glass opacities and septal  thickening with diffuse subpleural cysts, most likely represents acute  exacerbation of chronic fibrotic interstitial lung disease. Alveolar  proteinosis is also in the differential.  2. 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 .    I have personally reviewed the examination and initial interpretation  and I agree with the findings.    DUSTY RODRIGUEZ MD         SYSTEM ID:  [id number]   Blood gas venous   Result Value Ref Range    pH Venous 7.42 7.32 - 7.43    pCO2 Venous 37 (L) 40 - 50 mm Hg    pO2 Venous 33 25 - 47 mm Hg    Bicarbonate Venous 24 21 - 28 mmol/L    Base Excess/Deficit Venous -0.5 -3.0 - 3.0 mmol/L    FIO2 21     Oxyhemoglobin Venous 60 (L) 70 - 75 %    O2 Sat, Venous 61.6 (L) 70.0 - 75.0 %    Narrative    In healthy individuals, oxyhemoglobin (O2Hb) and oxygen saturation (SO2) are approximately equal. In the presence of dyshemoglobins, oxyhemoglobin can be considerably lower than oxygen saturation.   CT Head w/o Contrast    Narrative    EXAM: CT HEAD W/O CONTRAST  4/7/2024 5:37 PM     HISTORY:  AMS, fall       COMPARISON:  2/8/2024    TECHNIQUE: Using multidetector thin collimation helical acquisition  technique, axial, coronal and sagittal CT images from the skull base  to the vertex were obtained without intravenous contrast.   (topogram) image(s) also obtained and reviewed.    FINDINGS:  No intracranial hemorrhage, mass effect, or midline shift. No acute  loss of  gray-white matter differentiation in the cerebral hemispheres.  Ventricles are proportionate to the cerebral sulci. Moderate cerebral  atrophy. Patchy periventricular and subcortical white matter  hypodensities that are nonspecific, but likely secondary to small  vessel ischemic disease. Clear basal cisterns.    The bony calvaria and the bones of the skull base are normal. Mucosal  thickening and an air-fluid level in the right maxillary sinus and  partial opacity of the right ethmoid air cells, which are nonspecific  findings in the setting of intubation, otherwise the visualized  portions of the paranasal sinuses and mastoid air cells are clear.  Grossly normal orbits.       Impression    IMPRESSION:   1. No acute intracranial pathology.   2. Moderate cerebral atrophy and mild leukoaraiosis.    I have personally reviewed the examination and initial interpretation  and I agree with the findings.    NANI LLANOS MD         SYSTEM ID:  [id number]   Symptomatic Influenza A/B, RSV, & SARS-CoV2 PCR (COVID-19) Nose    Specimen: Nose; Swab   Result Value Ref Range    Influenza A PCR Negative Negative    Influenza B PCR Negative Negative    RSV PCR Negative Negative    SARS CoV2 PCR Negative Negative    Narrative    Testing was performed using the Xpert Xpress CoV2/Flu/RSV Assay on the [employer] GeneXpert Instrument. This test should be ordered for the detection of SARS-CoV-2, influenza, and RSV viruses in individuals who meet clinical and/or epidemiological criteria. Test performance is unknown in asymptomatic patients. This test is for in vitro diagnostic use under the FDA EUA for laboratories certified under CLIA to perform high or moderate complexity testing. This test has not been FDA cleared or approved. A negative result does not rule out the presence of PCR inhibitors in the specimen or target RNA in concentration below the limit of detection for the assay. If only one viral target is positive but coinfection with  multiple targets is suspected, the sample should be re-tested with another FDA cleared, approved, or authorized test, if coinfection would change clinical management. This test was validated by the [facility]. These laboratories are certified under the Clinical Laboratory Improvement Amendments of 1988 (CLIA-88) as qualified to perform high complexity laboratory testing.   UA with Microscopic reflex to Culture    Specimen: Urine, Clean Catch   Result Value Ref Range    Color Urine Straw Colorless, Straw, Light Yellow, Yellow    Appearance Urine Clear Clear    Glucose Urine Negative Negative mg/dL    Bilirubin Urine Negative Negative    Ketones Urine Negative Negative mg/dL    Specific Gravity Urine 1.011 1.003 - 1.035    Blood Urine Trace (A) Negative    pH Urine 5.5 5.0 - 7.0    Protein Albumin Urine Negative Negative mg/dL    Urobilinogen Urine Normal Normal, 2.0 mg/dL    Nitrite Urine Negative Negative    Leukocyte Esterase Urine Negative Negative    Mucus Urine Present (A) None Seen /LPF    RBC Urine 1 <=2 /HPF    WBC Urine 1 <=5 /HPF    Squamous Epithelials Urine <1 <=1 /HPF    Narrative    Urine Culture not indicated   Troponin T, High Sensitivity   Result Value Ref Range    Troponin T, High Sensitivity 202 (HH) <=22 ng/L   EKG 12-lead, complete   Result Value Ref Range    Systolic Blood Pressure  mmHg    Diastolic Blood Pressure  mmHg    Ventricular Rate 91 BPM    Atrial Rate 91 BPM    MS Interval 142 ms    QRS Duration 88 ms     ms    QTc 494 ms    P Axis 54 degrees    R AXIS -29 degrees    T Axis 56 degrees    Interpretation ECG       Sinus rhythm with Premature supraventricular complexes  Prolonged QT  Abnormal ECG  Unconfirmed report - interpretation of this ECG is computer generated - see medical record for final interpretation  Confirmed by - EMERGENCY ROOM, PHYSICIAN ([id number]),  JERONIMO GARCIA ([id number]) on 4/8/2024 6:42:19 AM     CT Cervical Spine w/o Contrast    Narrative     EXAM: CT CERVICAL SPINE W/O CONTRAST  LOCATION: [facility]  DATE: 4/7/2024    INDICATION: Suspect unwitnessed fall, altered mental status; Neck pain; Trauma; None of the following: Spondyloarthropathy, cervical x ray with negative result, questionable finding, or inadequate coverage  COMPARISON: None.  TECHNIQUE: Routine CT Cervical Spine without IV contrast. Multiplanar reformats. Dose reduction techniques were used.    FINDINGS:  VERTEBRA: Normal vertebral body heights. No fracture or posttraumatic subluxation. Minor anterolisthesis C4-C5 and C7-T1    CANAL/FORAMINA: At C3-C4, severe right foraminal stenosis. At C5-C6, moderate bilateral foraminal stenosis.    PARASPINAL: Emphysematous changes lung apices with subpleural interstitial reticulations.      Impression    IMPRESSION:  1.  No fracture or posttraumatic subluxation.     EKG 12-lead, complete   Result Value Ref Range    Systolic Blood Pressure  mmHg    Diastolic Blood Pressure  mmHg    Ventricular Rate 86 BPM    Atrial Rate 86 BPM    OR Interval 176 ms    QRS Duration 96 ms     ms    QTc 509 ms    P Axis 65 degrees    R AXIS -17 degrees    T Axis 53 degrees    Interpretation ECG       Sinus rhythm with occasional Premature ventricular complexes and Fusion complexes  Prolonged QT  Abnormal ECG  Unconfirmed report - interpretation of this ECG is computer generated - see medical record for final interpretation  Confirmed by - EMERGENCY ROOM, PHYSICIAN ([id number]),  JERONIMO GARCIA ([id number]) on 4/8/2024 6:42:28 AM     Troponin T, High Sensitivity   Result Value Ref Range    Troponin T, High Sensitivity 154 (HH) <=22 ng/L     Medications   vancomycin (VANCOCIN) 750 mg in sodium chloride 0.9 % 250 mL intermittent infusion (has no administration in time range)   levETIRAcetam (KEPPRA) tablet 750 mg (has no administration in time range)   lidocaine 1 % 0.1-1 mL (has no administration in time range)   lidocaine  (LMX4) cream (has no administration in time range)   sodium chloride (PF) 0.9% PF flush 3 mL (3 mLs Intracatheter $Given 4/8/24 0614)   sodium chloride (PF) 0.9% PF flush 3 mL (has no administration in time range)   senna-docusate (SENOKOT-S/PERICOLACE) 8.6-50 MG per tablet 1 tablet (has no administration in time range)     Or   senna-docusate (SENOKOT-S/PERICOLACE) 8.6-50 MG per tablet 2 tablet (has no administration in time range)   piperacillin-tazobactam (ZOSYN) 4.5 g vial to attach to  mL bag (4.5 g Intravenous $New Bag 4/8/24 0612)   enoxaparin ANTICOAGULANT (LOVENOX) injection 30 mg (30 mg Subcutaneous $Given 4/8/24 0001)   midodrine (PROAMATINE) tablet 10 mg (10 mg Oral $Given 4/8/24 0620)   ferrous sulfate (FEROSUL) tablet 325 mg (has no administration in time range)   thiamine (B-1) tablet 100 mg (has no administration in time range)   multivitamin w/minerals (THERA-VIT-M) tablet 1 tablet (has no administration in time range)   acetaminophen (TYLENOL) tablet 650 mg (650 mg Oral $Given 4/8/24 0001)   senna-docusate (SENOKOT-S/PERICOLACE) 8.6-50 MG per tablet 1 tablet (1 tablet Oral Not Given 4/8/24 0003)   polyethylene glycol (MIRALAX) Packet 17 g (has no administration in time range)   cyanocobalamin (VITAMIN B-12) sublingual tablet 1,000 mcg (has no administration in time range)   sodium chloride 0.9% BOLUS 1,000 mL (0 mLs Intravenous Stopped 4/7/24 2108)   furosemide (LASIX) injection 40 mg (40 mg Intravenous $Given 4/7/24 1805)   dexAMETHasone PF (DECADRON) injection 10 mg (10 mg Intravenous $Given 4/7/24 1838)   vancomycin (VANCOCIN) 1,250 mg in 0.9% NaCl 250 mL intermittent infusion (0 mg Intravenous Stopped 4/7/24 2302)   ceFEPIme (MAXIPIME) 2 g vial to attach to  mL bag for ADULTS or 50 mL bag for PEDS (0 g Intravenous Stopped 4/7/24 2108)   levETIRAcetam (KEPPRA) 2,000 mg in sodium chloride 0.9 % 250 mL intermittent infusion (0 mg Intravenous Stopped 4/7/24 2722)             Critical  care was not performed.     Medical Decision Making  The patient's presentation was of moderate complexity (an acute complicated injury).    The patient's evaluation involved:  review of external note(s) from 3+ sources (see separate area of note for details)  review of 3+ test result(s) ordered prior to this encounter (see separate area of note for details)  ordering and/or review of 3+ test(s) in this encounter (see separate area of note for details)    The patient's management necessitated high risk (a decision regarding hospitalization).    Assessments & Plan (with Medical Decision Making)     Trevor Larios is a 73 year old male who has a past medical history per chart review significant for seizure disorder and Crohn's disease arriving today to the emergency department via EMS from home due to concern of possible seizure and hypoxia.     Upon arrival patient is noted to be alert.  Presently protecting airway.  He appears to be oriented and interactive however somewhat delayed and slightly confused in general.  There is no visible signs of external trauma to the head or neck.  No sign of focal neurovascular deficit.  I did obtain CT imaging of the head demonstrate no sign of obvious acute stroke or intracranial bleed and have a low suspicion for this.  Unlikely be CNS infection.  Unclear if the patient did have a seizure.  I was ultimately able to gain fairly significant insight to the patient's prior medical conditions from the patient's daughter and son-in-law.  Was reported the patient had a fairly long hospitalization in [facility].  The patient was initially living in [place] and was hospitalized per daughter for failure to thrive.  There was some concern for development of or identification of an splenic aneurysm.  For this he was reportedly flown to [place] where they wanted to operate however secondary to malnutrition and severe low weight this was held.  Unclear why patient was hospitalized for  "nearly 2 months however he was dismissed early March.  They state he was doing well overall however the past day has developed increasing confusion.  Daughter reports he has declined approximate \"90%\".  My clinical examination he remains on oxygen at 4 L with some development of rales and wheezing.  I was able to obtain that he does have a history of COPD and may benefit from steroids and nebulized medication at this time with continued pulmonary toilet.  Low suspicion for ACS.  No obvious signs of dysrhythmia.  Laboratory studies in general demonstrate no significant anemia or electrolyte abnormality explain increased confusion.  No obvious seizure activity on my examination.  Has no other signs of traumatic injury on secondary survey.  Cause unclear however significant decline reportedly compared to past 1 week I do believe he would benefit from acute hospitalization for continued workup and monitoring.  Case discussed with hospital internal medicine will request neurology evaluation secondary to missed dose of antiepileptic with question of need for loading dose of valproic acid.      I have reviewed the nursing notes.    I have reviewed the findings, diagnosis, plan and need for follow up with the patient.    New Prescriptions    No medications on file       Final diagnoses:   Weakness   Falls frequently   SIRS (systemic inflammatory response syndrome) (H)   Shortness of breath       OVI SHAH MD    4/7/2024   [facility] EMERGENCY DEPARTMENT     Ovi Shah MD  04/08/24 0645    "

## 2024-04-08 ENCOUNTER — APPOINTMENT (OUTPATIENT)
Dept: CARDIOLOGY | Facility: CLINIC | Age: 74
DRG: 871 | End: 2024-04-08
Attending: PHYSICIAN ASSISTANT
Payer: MEDICARE

## 2024-04-08 ENCOUNTER — APPOINTMENT (OUTPATIENT)
Dept: NEUROLOGY | Facility: CLINIC | Age: 74
DRG: 871 | End: 2024-04-08
Payer: MEDICARE

## 2024-04-08 ENCOUNTER — TELEPHONE (OUTPATIENT)
Dept: CARE COORDINATION | Facility: CLINIC | Age: 74
End: 2024-04-08

## 2024-04-08 ENCOUNTER — APPOINTMENT (OUTPATIENT)
Dept: MRI IMAGING | Facility: CLINIC | Age: 74
DRG: 871 | End: 2024-04-08
Attending: PHYSICIAN ASSISTANT
Payer: MEDICARE

## 2024-04-08 LAB
ALBUMIN SERPL BCG-MCNC: 3.5 G/DL (ref 3.5–5.2)
ALP SERPL-CCNC: 60 U/L (ref 40–150)
ALT SERPL W P-5'-P-CCNC: 18 U/L (ref 0–70)
ANION GAP SERPL CALCULATED.3IONS-SCNC: 12 MMOL/L (ref 7–15)
AST SERPL W P-5'-P-CCNC: 39 U/L (ref 0–45)
ATRIAL RATE - MUSE: 86 BPM
ATRIAL RATE - MUSE: 91 BPM
ATRIAL RATE - MUSE: 96 BPM
BILIRUB SERPL-MCNC: 0.9 MG/DL
BUN SERPL-MCNC: 23.6 MG/DL (ref 8–23)
CALCIUM SERPL-MCNC: 8.5 MG/DL (ref 8.8–10.2)
CHLORIDE SERPL-SCNC: 105 MMOL/L (ref 98–107)
CORTIS SERPL-MCNC: 2.7 UG/DL
CREAT SERPL-MCNC: 0.77 MG/DL (ref 0.67–1.17)
DEPRECATED HCO3 PLAS-SCNC: 24 MMOL/L (ref 22–29)
DIASTOLIC BLOOD PRESSURE - MUSE: NORMAL MMHG
EGFRCR SERPLBLD CKD-EPI 2021: >90 ML/MIN/1.73M2
ERYTHROCYTE [DISTWIDTH] IN BLOOD BY AUTOMATED COUNT: 16.2 % (ref 10–15)
GLUCOSE SERPL-MCNC: 112 MG/DL (ref 70–99)
HCT VFR BLD AUTO: 30 % (ref 40–53)
HGB BLD-MCNC: 9.4 G/DL (ref 13.3–17.7)
INR PPP: 1.24 (ref 0.85–1.15)
INTERPRETATION ECG - MUSE: NORMAL
LVEF ECHO: NORMAL
MCH RBC QN AUTO: 33.9 PG (ref 26.5–33)
MCHC RBC AUTO-ENTMCNC: 31.3 G/DL (ref 31.5–36.5)
MCV RBC AUTO: 108 FL (ref 78–100)
P AXIS - MUSE: 54 DEGREES
P AXIS - MUSE: 56 DEGREES
P AXIS - MUSE: 65 DEGREES
PLATELET # BLD AUTO: 248 10E3/UL (ref 150–450)
POTASSIUM SERPL-SCNC: 4 MMOL/L (ref 3.4–5.3)
PR INTERVAL - MUSE: 142 MS
PR INTERVAL - MUSE: 164 MS
PR INTERVAL - MUSE: 176 MS
PROT SERPL-MCNC: 6.8 G/DL (ref 6.4–8.3)
QRS DURATION - MUSE: 88 MS
QRS DURATION - MUSE: 90 MS
QRS DURATION - MUSE: 96 MS
QT - MUSE: 384 MS
QT - MUSE: 402 MS
QT - MUSE: 426 MS
QTC - MUSE: 485 MS
QTC - MUSE: 494 MS
QTC - MUSE: 509 MS
R AXIS - MUSE: -17 DEGREES
R AXIS - MUSE: -29 DEGREES
R AXIS - MUSE: 20 DEGREES
RBC # BLD AUTO: 2.77 10E6/UL (ref 4.4–5.9)
SODIUM SERPL-SCNC: 141 MMOL/L (ref 135–145)
SYSTOLIC BLOOD PRESSURE - MUSE: NORMAL MMHG
T AXIS - MUSE: 53 DEGREES
T AXIS - MUSE: 56 DEGREES
T AXIS - MUSE: 64 DEGREES
TROPONIN T SERPL HS-MCNC: 154 NG/L
VENTRICULAR RATE- MUSE: 86 BPM
VENTRICULAR RATE- MUSE: 91 BPM
VENTRICULAR RATE- MUSE: 96 BPM
WBC # BLD AUTO: 10.2 10E3/UL (ref 4–11)

## 2024-04-08 PROCEDURE — 70551 MRI BRAIN STEM W/O DYE: CPT | Mod: 26 | Performed by: RADIOLOGY

## 2024-04-08 PROCEDURE — 70551 MRI BRAIN STEM W/O DYE: CPT | Mod: MG

## 2024-04-08 PROCEDURE — 250N000011 HC RX IP 250 OP 636: Performed by: PHYSICIAN ASSISTANT

## 2024-04-08 PROCEDURE — 250N000011 HC RX IP 250 OP 636: Performed by: EMERGENCY MEDICINE

## 2024-04-08 PROCEDURE — 999N000208 ECHOCARDIOGRAM COMPLETE

## 2024-04-08 PROCEDURE — 99222 1ST HOSP IP/OBS MODERATE 55: CPT | Performed by: INTERNAL MEDICINE

## 2024-04-08 PROCEDURE — 80053 COMPREHEN METABOLIC PANEL: CPT | Performed by: PHYSICIAN ASSISTANT

## 2024-04-08 PROCEDURE — 99222 1ST HOSP IP/OBS MODERATE 55: CPT | Mod: GC | Performed by: STUDENT IN AN ORGANIZED HEALTH CARE EDUCATION/TRAINING PROGRAM

## 2024-04-08 PROCEDURE — 36415 COLL VENOUS BLD VENIPUNCTURE: CPT | Performed by: PHYSICIAN ASSISTANT

## 2024-04-08 PROCEDURE — 95720 EEG PHY/QHP EA INCR W/VEEG: CPT | Mod: GC | Performed by: PSYCHIATRY & NEUROLOGY

## 2024-04-08 PROCEDURE — 258N000003 HC RX IP 258 OP 636: Performed by: EMERGENCY MEDICINE

## 2024-04-08 PROCEDURE — 85027 COMPLETE CBC AUTOMATED: CPT | Performed by: PHYSICIAN ASSISTANT

## 2024-04-08 PROCEDURE — 120N000003 HC R&B IMCU UMMC

## 2024-04-08 PROCEDURE — 250N000013 HC RX MED GY IP 250 OP 250 PS 637: Performed by: PHYSICIAN ASSISTANT

## 2024-04-08 PROCEDURE — 99207 EEG VIDEO 12-26 HR UNMONITORED: CPT | Performed by: PSYCHIATRY & NEUROLOGY

## 2024-04-08 PROCEDURE — 99233 SBSQ HOSP IP/OBS HIGH 50: CPT | Mod: FS | Performed by: STUDENT IN AN ORGANIZED HEALTH CARE EDUCATION/TRAINING PROGRAM

## 2024-04-08 PROCEDURE — 85610 PROTHROMBIN TIME: CPT | Performed by: STUDENT IN AN ORGANIZED HEALTH CARE EDUCATION/TRAINING PROGRAM

## 2024-04-08 PROCEDURE — 82533 TOTAL CORTISOL: CPT | Performed by: PHYSICIAN ASSISTANT

## 2024-04-08 PROCEDURE — 93306 TTE W/DOPPLER COMPLETE: CPT | Mod: 26 | Performed by: INTERNAL MEDICINE

## 2024-04-08 PROCEDURE — 99207 PR APP CREDIT; MD BILLING SHARED VISIT: CPT | Performed by: PHYSICIAN ASSISTANT

## 2024-04-08 PROCEDURE — G1010 CDSM STANSON: HCPCS | Performed by: RADIOLOGY

## 2024-04-08 PROCEDURE — 95714 VEEG EA 12-26 HR UNMNTR: CPT

## 2024-04-08 RX ORDER — LORAZEPAM 2 MG/ML
0.25 INJECTION INTRAMUSCULAR ONCE
Status: COMPLETED | OUTPATIENT
Start: 2024-04-08 | End: 2024-04-08

## 2024-04-08 RX ORDER — ASPIRIN 81 MG/1
81 TABLET, CHEWABLE ORAL EVERY EVENING
Status: DISCONTINUED | OUTPATIENT
Start: 2024-04-08 | End: 2024-04-11 | Stop reason: HOSPADM

## 2024-04-08 RX ORDER — DIVALPROEX SODIUM 500 MG/1
500 TABLET, DELAYED RELEASE ORAL 2 TIMES DAILY
Status: ON HOLD | COMMUNITY
End: 2024-04-11

## 2024-04-08 RX ADMIN — Medication 1000 MCG: at 08:06

## 2024-04-08 RX ADMIN — VANCOMYCIN HYDROCHLORIDE 750 MG: 1 INJECTION, POWDER, LYOPHILIZED, FOR SOLUTION INTRAVENOUS at 08:12

## 2024-04-08 RX ADMIN — ASPIRIN 81 MG CHEWABLE TABLET 81 MG: 81 TABLET CHEWABLE at 19:46

## 2024-04-08 RX ADMIN — FERROUS SULFATE TAB 325 MG (65 MG ELEMENTAL FE) 325 MG: 325 (65 FE) TAB at 08:05

## 2024-04-08 RX ADMIN — LEVETIRACETAM 750 MG: 750 TABLET, FILM COATED ORAL at 08:06

## 2024-04-08 RX ADMIN — LEVETIRACETAM 750 MG: 750 TABLET, FILM COATED ORAL at 19:46

## 2024-04-08 RX ADMIN — PIPERACILLIN SODIUM AND TAZOBACTAM SODIUM 4.5 G: 4; .5 INJECTION, POWDER, LYOPHILIZED, FOR SOLUTION INTRAVENOUS at 17:49

## 2024-04-08 RX ADMIN — ACETAMINOPHEN 650 MG: 325 TABLET, FILM COATED ORAL at 00:01

## 2024-04-08 RX ADMIN — MIDODRINE HYDROCHLORIDE 10 MG: 5 TABLET ORAL at 06:20

## 2024-04-08 RX ADMIN — LORAZEPAM 0.25 MG: 2 INJECTION INTRAMUSCULAR; INTRAVENOUS at 10:51

## 2024-04-08 RX ADMIN — ACETAMINOPHEN 650 MG: 325 TABLET, FILM COATED ORAL at 19:52

## 2024-04-08 RX ADMIN — MIDODRINE HYDROCHLORIDE 10 MG: 5 TABLET ORAL at 14:54

## 2024-04-08 RX ADMIN — VANCOMYCIN HYDROCHLORIDE 750 MG: 1 INJECTION, POWDER, LYOPHILIZED, FOR SOLUTION INTRAVENOUS at 19:46

## 2024-04-08 RX ADMIN — ENOXAPARIN SODIUM 30 MG: 30 INJECTION SUBCUTANEOUS at 00:01

## 2024-04-08 RX ADMIN — ENOXAPARIN SODIUM 30 MG: 30 INJECTION SUBCUTANEOUS at 22:59

## 2024-04-08 RX ADMIN — MIDODRINE HYDROCHLORIDE 10 MG: 5 TABLET ORAL at 19:46

## 2024-04-08 RX ADMIN — THIAMINE HCL TAB 100 MG 100 MG: 100 TAB at 08:05

## 2024-04-08 RX ADMIN — PIPERACILLIN SODIUM AND TAZOBACTAM SODIUM 4.5 G: 4; .5 INJECTION, POWDER, LYOPHILIZED, FOR SOLUTION INTRAVENOUS at 06:12

## 2024-04-08 RX ADMIN — POLYETHYLENE GLYCOL 3350 17 G: 17 POWDER, FOR SOLUTION ORAL at 08:06

## 2024-04-08 RX ADMIN — PIPERACILLIN SODIUM AND TAZOBACTAM SODIUM 4.5 G: 4; .5 INJECTION, POWDER, LYOPHILIZED, FOR SOLUTION INTRAVENOUS at 12:33

## 2024-04-08 RX ADMIN — Medication 1 TABLET: at 08:05

## 2024-04-08 ASSESSMENT — ACTIVITIES OF DAILY LIVING (ADL)
ADLS_ACUITY_SCORE: 35
ADLS_ACUITY_SCORE: 46
ADLS_ACUITY_SCORE: 46
ADLS_ACUITY_SCORE: 44
ADLS_ACUITY_SCORE: 44
ADLS_ACUITY_SCORE: 35
ADLS_ACUITY_SCORE: 35
ADLS_ACUITY_SCORE: 44
ADLS_ACUITY_SCORE: 37
ADLS_ACUITY_SCORE: 44
ADLS_ACUITY_SCORE: 35
ADLS_ACUITY_SCORE: 37
ADLS_ACUITY_SCORE: 37
ADLS_ACUITY_SCORE: 35
ADLS_ACUITY_SCORE: 37
ADLS_ACUITY_SCORE: 37
ADLS_ACUITY_SCORE: 46
ADLS_ACUITY_SCORE: 35
ADLS_ACUITY_SCORE: 35
ADLS_ACUITY_SCORE: 37

## 2024-04-08 NOTE — PROGRESS NOTES
[facility]  Neurology Progress Note    Patient Name:  Trevor Larios  MRN:  [id number]    :  1950  Date of Admission:  2024  Date of Service:  2024  Hospital Day: 2     Interval History/24-hour Events   Interval Events:  Patient is interviewed in the ED today. Notes poor sleep and possible dizziness and headache leading up to possible seizure that brought him in to the hospital. Also notes chronic poor vision and memory as well as arm weakness. Patient reports he has not been drinking enough and may be dehydrated. Denies any numbness, difficulty speaking, or difficulty swallowing.    Per interview with daughter, it has been difficult to maintain continuity of care as the patient was previously living in [place], then moved to [place] where he was hospitalized from 2024 to 2024 for a seizure. He recently moved to [place] and has been having difficulties obtaining his prescription medications with his insurance. Notes that the patient has had mild cognitive impairment (forgetting passwords and phone numbers) but this has worsened since his hospitalization in [place].     Today's Changes:  -Started EEG     Assessment & Plan    Trevor Larios is a right-handed 73 year old male with PMHx of seizure disorder previously on Depakote and Zonegram, cognitive impairment, and Crohn's disease who arrived to the ED from home on 2024 due to altered mental status, possible fall, and possible seizure in the setting of medication non-compliance and poor continuity of care. He recently moved to [place] a few weeks ago from [place] and lives alone. Neurological examination with intact orientation to person, place, and situation without any focal motor, sensory, or coordination deficits. PTA anti-seizure medications (Depakote and Zonegram) have been discontinued due to concerns for worsening cognitive impairment and Keppra has been initiated. We  will proceed with plan to obtain MRI brain and EEG for further investigation of etiology of seizures. In the meantime, we will coordinate home services upon discharge with the help of .    Recommendations:  -MRI Brain  -vEEG x 24 hours [Ordered by Neurology]  -Discontinue PTA Depakote and Zonegram  -s/p Loading dose of Keppra 2g IV on 4/7  -Continue Keppra 750mg BID  -PT/OT evaluation for fall-risk  -Coordinate primary care and home health services as he is new to the state  -Neurology referral for outpatient follow-up [Ordered by Neurology]    Neurology will continue to follow.    Thank you for allowing the neurology service to participate in the care of Trevor Larios.  Please contact us with questions.      This patient was discussed with the neurology attending faculty, Dr. Leonardo Vogel.    Jacki John, MS3    Resident/Fellow Attestation   I, William Lmi DO, was present with the medical/WILL student who participated in the service and in the documentation of the note.  I have verified the history and personally performed the physical exam and medical decision making. I agree with the assessment and plan of care as documented in the note.      William Lim DO  Neurology Resident PGY3  04/08/2024  Neurology Pager: [phone]       Physical Exam   Temp:  [98.3  F (36.8  C)-99.9  F (37.7  C)] 98.3  F (36.8  C)  Pulse:  [73-97] 82  Resp:  [26] 26  BP: ()/(58-74) 111/74  SpO2:  [93 %-96 %] 96 %    I/O last 3 completed shifts:  In: -   Out: 1525 [Urine:1525]     Neurologic  Mental Status:  alert, oriented x 3, follows commands, speech clear and fluent, naming and repetition normal  Cranial Nerves:  visual fields intact, EOMI with normal smooth pursuit, facial sensation intact and symmetric, facial movements symmetric, hearing not formally tested but intact to conversation, no dysarthria, shoulder shrug strong bilaterally, tongue protrusion midline  Motor:  normal muscle tone and bulk, no  "abnormal movements, able to move all limbs spontaneously, strength 5/5 throughout upper and lower extremities  Reflexes:  2+ and symmetric throughout, no clonus, diminished achilles tendon reflex, negative Armstrong's sign  Sensory:  light touch sensation intact and symmetric throughout upper and lower extremities  Coordination:  normal finger-to-nose and heel-to-shin bilaterally without dysmetria  Station/Gait:  deferred     Investigations   Labs  BMP  Recent Labs   Lab 04/08/24  0904 04/07/24  1704    137   POTASSIUM 4.0 4.1   CHLORIDE 105 107   CO2 24 21*   BUN 23.6* 19.5   CR 0.77 0.68   MALIHA 8.5* 8.4*       Liver Panel  Recent Labs   Lab 04/08/24  0904 04/07/24  1704   PROTTOTAL 6.8 6.7   ALBUMIN 3.5 3.4*   BILITOTAL 0.9 0.5   ALKPHOS 60 64   AST 39 32   ALT 18 17       CBC  Recent Labs   Lab 04/08/24  0904 04/07/24  1704   WBC 10.2 14.3*   HGB 9.4* 9.1*    266       COAGS  Recent Labs   Lab 04/08/24  0904   INR 1.24*       ABG  No results for input(s): \"PH\", \"PCO2\", \"PO2\", \"HCO3\" in the last 168 hours.    CRP/ESR  No results for input(s): \"CRP\" in the last 168 hours.    Invalid input(s): \"ESR\"    CSF  No results for input(s): \"CGLU\", \"CTP\" in the last 168 hours.    Invalid input(s): \"CCSF\"    MICRO  No results for input(s): \"CULT\" in the last 168 hours.    LIPIDS  No results for input(s): \"CHOL\", \"HDL\", \"LDL\", \"TRIG\", \"CHOLHDLRATIO\" in the last 53049 hours.    A1C    No lab results found.    Imaging  I have personally reviewed most recent and pertinent labs, tests, and radiological images; relevant findings per HPI.     "

## 2024-04-08 NOTE — PROGRESS NOTES
SW called and left message for pt daughter Rylee in response to consult placed for discharge support/planning.    ED SW will reach out again as appropriate.       PAULINO Stewart, SW  [facility]   Phone: [phone]  Pager: [phone]  [email or web address]

## 2024-04-08 NOTE — PROVIDER NOTIFICATION
"Provider notified: Myron Cox    \"MRI has a spot for pt around 11 but 8it makes him feel claustrophobic. His BP was 86/59 even after getting his midodrine early this morning. Could he get something for anxiety before MRI?\"    1x dose 0.25mg ativan ordered to give before MRI. Continue to monitor closely.  "

## 2024-04-08 NOTE — PHARMACY-VANCOMYCIN DOSING SERVICE
Pharmacy Vancomycin Initial Note  Date of Service 2024  Patient's  1950  73 year old, male    Indication: Sepsis    Current estimated CrCl = Estimated Creatinine Clearance: 72.6 mL/min (based on SCr of 0.68 mg/dL).    Creatinine for last 3 days  2024:  5:04 PM Creatinine 0.68 mg/dL    Recent Vancomycin Level(s) for last 3 days  No results found for requested labs within last 3 days.      Vancomycin IV Administrations (past 72 hours)        No vancomycin orders with administrations in past 72 hours.                    Nephrotoxins and other renal medications (From now, onward)      Start     Dose/Rate Route Frequency Ordered Stop    24 0800  vancomycin (VANCOCIN) 750 mg in sodium chloride 0.9 % 250 mL intermittent infusion         750 mg  over 90 Minutes Intravenous EVERY 12 HOURS 24  vancomycin (VANCOCIN) 1,250 mg in 0.9% NaCl 250 mL intermittent infusion         1,250 mg  over 90 Minutes Intravenous ONCE 24 193              Contrast Orders - past 72 hours (72h ago, onward)      None            [employer] Prediction of Planned Initial Vancomycin Regimen  Loading dose: 1250 mg at 20:30 2024.  Regimen: 750 mg IV every 12 hours.  Start time: 08:00 on 2024  Exposure target: AUC24 (range)400-600 mg/L.hr   AUC24,ss: 492 mg/L.hr  Probability of AUC24 > 400: 72 %  Ctrough,ss: 15.4 mg/L  Probability of Ctrough,ss > 20: 28 %  Probability of nephrotoxicity (Lodise KELLIE ): 11 %        Plan:  Start vancomycin  1250 mg IV once followed by 750 mg IV q12h.   Vancomycin monitoring method: AUC  Vancomycin therapeutic monitoring goal: 400-600 mg*h/L  Pharmacy will check vancomycin levels as appropriate in 1-3 Days.    Serum creatinine levels will be ordered daily for the first week of therapy and at least twice weekly for subsequent weeks.      Abi Deng, JoshD
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
## 2024-04-08 NOTE — MEDICATION SCRIBE - ADMISSION MEDICATION HISTORY
Medication Scribe Admission Medication History    Admission medication history is complete. The information provided in this note is only as accurate as the sources available at the time of the update.     Information Source(s): [email or web address] via  Chart Review    Pertinent Information: Per pt Chart Review and Care Everywhere and [facility], medications on PTA medication list are active. DR showed pt last filled Divalproex 500 mg tabs was 4/6/24.      Changes made to PTA medication list:  Added: Divalproex 500 mg tabs.  Deleted: None  Changed: None    Allergies reviewed with patient and updates made in EHR: no    Medication History Completed By: Martha Wetzel 4/8/2024 4:25 AM    PTA Med List   Medication Sig Last Dose    divalproex sodium delayed-release (DEPAKOTE) 500 MG DR tablet Take 500 mg by mouth 2 times daily Unknown    midodrine (PROAMATINE) 5 MG tablet Take 3 tablets (15 mg) by mouth 3 times daily Unknown    thiamine (B-1) 100 MG tablet Take 1 tablet (100 mg) by mouth daily Unknown    vitamin C (ASCORBIC ACID) 500 MG tablet Take 1 tablet (500 mg) by mouth daily Unknown    Vitamin D, Cholecalciferol, 25 MCG (1000 UT) CAPS Take 1,000 mcg by mouth daily Unknown

## 2024-04-08 NOTE — PLAN OF CARE
Brief Cardiology Note   04/07/2024    Trevor Larios MRN# [id number]   Age: 73 year old YOB: 1950     Cardiology was consulted for evaluation of rising troponin. Patient is currently admitted following an episode of breakthrough seizures in the setting of missed medications. Of note his seizure episodes were complicated by profound hypoxia (SaO2 in 60s when found by EMS) which is now resolved. His medical history is significant for significant ILD and Crohns Disease. He also has an abdominal aortic aneurysm which is being evaluated by Vascular surgery.     On interview with the patient he denies any chest pain or pressure. Notes some shortness of breath while at rest though denies any diaphoresis. On conversation with his daughter he has been in a good state of health since his recent hospitalization and has been completing activities at home such as going to [employer] without any concerns.     He is hemodynamically stable. His troponin was 149 on arrival and increased to 202 over three hours. His presenting EKG is sinus w/o ischemic changes. His CT chest is notable for chronic fibrotic interstitial lung disease in addition to moderate calcification of his coronary arteries (LAD and Lcx territories). He had a recent echo (see media tab) which revealed normal biventricular function with moderate AI and PI.     At this time, given he has no cardiac symptoms and has no ischemic changes on his EKG, this is likely acute myocardial injury in the setting of demand ischemia from his seizure event and hypoxia. Would trend troponin to peak however given he does have underlying subclinical CAD and the substrate for possible plaque rupture though based on review of the available evidence this is much less likely     Recommendations:  - trend troponin Q4hrs until peaked  - please reach out to cardiology if troponin continues to rise with significant slope    Fabricio Dumont MD  Cardiology Fellow

## 2024-04-08 NOTE — ED NOTES
"Paged Dr Weinstein \"MRI was unsuccessful. Pt became upset, started moving and said he no longer wanted the scan\"  Dr Weinstein replied \"Ok pass that on to the next shift\"  "

## 2024-04-08 NOTE — PLAN OF CARE
Goal Outcome Evaluation:      Plan of Care Reviewed With: patient    Overall Patient Progress: no changeOverall Patient Progress: no change    Outcome Evaluation: see RD note 4/8    Ashwini Petersen MS, RD, LD, [facility]    6C (beds 4881-1567) + 7C (beds 6776-5937) + ED   Available in Vocera by name or unit dietitian  No longer available via pager

## 2024-04-08 NOTE — CONSULTS
"[facility]: [place]  Neurology Consult Note H&P  Patient Name:  Trevor Larios  MRN:  [id number]    :  1950  Date of Admission:  2024  Date of Service:  2024  Primary care provider:  No Ref-Primary, Physician      Consult question:  \"Request per Internal Medicine. ? Seizure in patient w/ seizure d/o. Missed antiepileptics yesterday. Question per IM attending of need to load with Depakote\"    History of Present Illness:   Trevor Larios is a 73 year old male with history of seizure disorder on VPA and zonismide, history of frequent falls, cognitive impairment, vit b12 def, crohn's disease, who presents from home due to concern for possible seizure.     The patient lives by himself in an apartment and was found not to be responding to messages and so his brother-in-law went to check on him and found him to be with altered with mentation an dso EMS was called and noted the patient to be hypoxic with initial SpO2 in the 60s and some concern for persistent altered mentation per EMS.  They reported initial blood pressure in the 80s however improved without intervention to the low 100s.  Per chart review it appears he takes  mg BID for a seizure disorder.     Review of lab workup so far shows normal BMP with elevated troponin and BNP, leukocytosis to 14.3, UA unrevealing of infection, virology negative.  AST and ALT wnl. Last VPA level as of 3/15 with total at 46 and %free 24 and free level 11, procal elevated to 0.71. CT head with no acute pathology. Per brief chart review as limited records in his chart and care everywhere there is a note from 3/2024 showing VPA dosing of 500 mg BID as well as zonisamide 100 mg BID, with allergy to Keppra as  \"fatigue\". In this note there is mention of concern about compliancy and confusion with dosing of his medications     In talking with family (son in law and daughter) at bedside they report, that they last saw Trevor 4/6 PM " for dinner, and then had plans to see him for lunch, [employer] and to bring him some new furniture around noon today.  They were unable to get a hold of him before hand he has not been answering the phone.  Around noon and the son-in-law called and he did not answer but there was no verbalization (mom mentions that this is not atypical for him the symptoms he has difficulty using the phone).  Upon arrival, the SETH found him in his apartment laying in his bed, and he did not acknowledge his presence. The SETH reports noticing that the TV was knocked over, that is by his bed, nothing else out of the ordinary. The SETH mentions that he would open his eyes briefly but was not really talking to him but would track and nod his head, and EMS was called. When he asked Trevor if he had a seizure, he nodded Yes.     His daughter and SETH help manage all of his medications and pillbox, which they brought tonight to show as well.  They acknowledge that they knew he was out of his seizure medication since Friday. He is reported to be on both Depakote and zonisamide, and per the bottles in the room Depakote is prescribed 500 mg twice a day and zonisamide 100 mg twice. He has been out of both of these since Friday last dose Friday night.  We tried to get him some urgent refills, and so he did end up taking 1 dose of Depakote Saturday evening, but has not had any zonisamide since Friday night and no medications today.  When we further discuss his seizure disorder,   His daughter tells me that this all started in August 2023 while living in [place], and seems like it came out of the morning he was having 3-4 seizures a week.  It has been well-controlled on Depakote but recently zonisamide was added after a February admission where he had 2 more seizures.  He appears to be tolerating these medications well and has not had any seizures since then.  She thinks that they possibly did an MRI but uncertain of the workup and was never told the  "cause of his seizure disorder, she has not completed while he still lived in [place], so they do not have any records.      The event lasted: uncertain.  This has happened before, starting in August 2023, last seizure in February 2024 while hospitalized that daughter witnessed and described as full body shaking convulsions with eye gaze to the right, prolonged 12 hour post ictal of fatigue.  Patient and family denies recent significant stress, sleep deprivation, or other triggers aside from the missed dosing, as above.      Tongue biting: no evidence of such, none prior  Incontinence: no evidence of such,  prior yes  Injuries: left head with small temporal bruise, left lip injury  Post-ictal period: yes, h/o prolonged  Auras: denies  Hallucinations: denies  ----  Sweating/lightheaded prodrome: denies  History of heart problems: denies  SOB: none  Headache: mild HA prior in the past, mild holocephalic headache now  Alcohol use: none  Drug use: none    ROS: A comprehensive ROS was performed and pertinent findings were included in HPI.     PMH:  Past Medical History:   Diagnosis Date    Crohn's disease (H)     Dementia (H) 2023    \"moderate.\"     Past Surgical History:   Procedure Laterality Date    ORIF HIP FRACTURE  2022    SHOULDER SURGERY Right     2022?       Medications   I have personally reviewed the patient's medication list.     Allergies  I have personally reviewed the patient's allergy list.     Social History:  patient lives by himself in an apartment ([facility] complex but no help) his daughter and her  see him daily and help with groceries, medications and bathing. He used to live with other daughter in [place], recently moved here as of 3/22   x2 (per chart)  2 daughters, lives here bc daughter moved him from [place]  Worked in [employer] industry, retired  Remote history of smoking  No etoh, rec drug use  He does not drive       Family History:    No fhx of seizures      Physical Examination: "   Constitutional   - Vitals: BP 96/65   Pulse 95   Temp 98.3  F (36.8  C) (Oral)   Resp 26   SpO2 93%    - General: Frail and elderly man, Lying in bed, NAD, oxymask on face, eyes closed, family at bedside  Head: NC/AT, small left temporal excoriation without edema or bleeding  Eyes: no icterus, op pink and moist  Cardiac: Extremities warm, no edema.   Respiratory: non-labored on oxy max  GI: S/NT/ND, very thin  Skin: No rash or lesion on exposed skin  Neuro:  Mental status: awakens to voice but keeps eyes close, oriented to name, age as 56, place as hospital, not circumstance, but states [place] state correctly and recently moved from [place]. Language is fluent and coherent with intact comprehension of complex commands, naming and repetition.Able to spell world fwd and backwards, names objects  Cranial nerves:  PERRL, conjugate gaze, EOMI, facial sensation intact, face symmetric, shoulder shrug strong, tongue/uvula midline, no dysarthria.   Motor: decreased bulk and normal tone. No abnormal movements. 5/5 strength bilaterally in  biceps, triceps, hand , and hip flexors, knee flexion,plantarflexion, dorsiflexion.   Reflexes: normo reflexic and symmetric biceps, brachioradialis,  patellae, and achilles. Negative Armstrong, no clonus, toes down-going.  Sensory: Intact to light x4  Coordination: patient did 1x F2N without obvious dysmetria or ataxia  Gait: deferred (walks with cane at baseline)     Investigations:    I have personally reviewed pertinent labs, tests, and radiology images. Discussion of notable findings is included under Impression.     Did the patient transfer from an outside hospital?   No    Assessment:  Trevor Lairos is a 73 year old male with history of seizure disorder on VPA and zonegram and neurocognitive impairments, who recently moved to [place] a few weeks ago, who presents from home due to concern for possible seizure in the setting of medication non compliance, for which appears has been a  concern since last visit with Dr. Crouch 3/15 with confusion about medications and dosing. His family admits to medication non compliance with being out of his medications as of Friday, with only a single dose of VPA 4/8.  His examination is reassuring against continued seizures or NCSE as he is following commands, non focal examination and verbalizing, but he appears fatigued.  This is most likely medication non compliance break through seizure event, but it remains entirely unknown th nature of the event as it was not witnessed, however there is clear medication non-adherence. The nature of his seizure disorder remains unknown but could be related neurocognitive impairment. Considering he has been off VPA now 1 day and zonegram 2 days (1/2 life of 50-69, such will slowly titrate out of his symptoms), I am concerned has not had VPA concerns with elderly age, cognitive impairment, simplify regiment     Plan:  #Break through seizures known seizure disorder  -Switch AED: to Keppra, give 2 g load now and continue with 750 mg BID (VPA concerns with elderly age, cognitive impairment), do not continue zonegram  -MRI brain seizure protocol while admitted  -EEG  in AM and neurology follow up as outpatient  -He does not drive at baseline        The patient was discussed with Dr. Vogel, who agrees with my assessment and plan.    Kendy Cantrell DO, NAOMI  Neurology Resident, PGY-4

## 2024-04-08 NOTE — PROGRESS NOTES
"CLINICAL NUTRITION SERVICES - ASSESSMENT NOTE     Nutrition Prescription    RECOMMENDATIONS FOR MDs/PROVIDERS TO ORDER:  None noted    Malnutrition Status:    Severe malnutrition in the context of chronic illness/disease    Recommendations already ordered by Registered Dietitian (RD):  Ensure  Enlive  contains 350 kcal, 20 grams protein, 44 grams carbohydrate, 11 grams fat, 3 grams fiber per 8 fl oz.   Continue MVI+ minerals daily. Additional supplementation per team.  Encourage 2-3 meals/day with protein sources    Future/Additional Recommendations:  Modify diet to softer textures as requested/preferred by patient  Monitor nutrition related findings and follow up per protocol     REASON FOR ASSESSMENT  Trevor Larios is a/an 73 year old male assessed by the dietitian for Provider Order - likely severe malnutrition    Patient admitted for family found him in his apartment unable to speak or move with concern for recurrent seizure and recent fall. Patient likely had provoked seizure due to missing his medications for 24 hours however also with concern for possible infection.     MEDICAL HISTORY  prior left hip fracture s/p repair, left shoulder fracture s/p replacement, tobacco use disorder, Crohn's disease, recent COVID infection, mild to moderate cognitive impairment and seizure disorder.     NUTRITION HISTORY  Met with pt at bedside. Pt reports poor appetite for awhile. Denies N/V but reports congestion. Reports he has not had a BM recently. Reports some difficulty chewing d/t missing teeth. Denies issues swallowing other than those related to congestion. States his son/daughter help cook for him sometimes and he also receives some frozen/prepared meals. Reports typically eating ~1-1.5 meals/day. When asked what he typically eats he reports \"nothing good\"  but does not elaborate on this with further questioning. Reports he does have protein shakes at home but is unsure of what kind. Reports weight loss. States he " used to weigh ~ 160lbs back in December and now weighs closer to ~120lbs. Unable to verify weight history as no additional weight history available. Assisted pt with ordering a meal. Discussed importance of Po efforts with protein sources. Pt did not clarify whether he would prefer a softer textured diet order or if he prefers to manage this on his own. Agreeable to Ensure Enlive with meals.     CURRENT NUTRITION ORDERS  Diet: Regular    Intake/Tolerance: No documented intakes to assess.    LABS  CO2 21  Ferritin 535  Glu 114  Iron 42  WBC 14.3  hgb 9.1  Hematocrit 28.9    MEDICATIONS  Vit B12  Iron  Thera-vit-m  Abx  Miralax  Senna-docusate  Thiamine      ANTHROPOMETRICS  Height: 176.5cm  Most Recent Weight:      IBW: 75.5 kg  There is no height or weight on file to calculate BMI. BMI Category: No recent weight history to assess BMI  Weight History:  25% wt loss reported over last 4 months. Unable to verify as no additional weight history available.   Wt Readings from Last 20 Encounters:   03/15/24 54.7 kg (120 lb 8 oz)         ASSESSED NUTRITION NEEDS  Dosing Weight: 54.7 kg ( most recent weight from 3/15 )   Estimated Energy Needs: 1414-6155 kcals/day (30 - 35 kcals/kg )  Justification: Repletion  Estimated Protein Needs: 60-91 grams protein/day (1.1 - 1.4 grams of pro/kg)  Justification: Increased needs and Repletion  Estimated Fluid Needs: 0049-6588 mL/day (25 - 30 mL/kg)   Justification: Maintenance    PHYSICAL FINDINGS  See malnutrition section below.      MALNUTRITION  % Intake: < 75% for > 7 days (moderate)  % Weight Loss: Unable to assess   Subcutaneous Fat Loss: Facial region:  severe   Muscle Loss: Thoracic region (clavicle, acromium bone, deltoid, trapezius, pectoral):  severe and Upper arm (bicep, tricep):  severe  Fluid Accumulation/Edema: None noted  Malnutrition Diagnosis: Severe malnutrition in the context of chronic illness/disease    NUTRITION DIAGNOSIS  Malnutrition related to inadequate oral  intake as evidenced by severe muscle/subcutaneous fat loss.      INTERVENTIONS  Implementation  Nutrition Education: will be provided if nutrition education needs arise.  and Nutrition Education: Introduced role of RD   Medical food supplement therapy  Multivitamin/mineral supplement therapy     Goals  Patient to consume % of nutritionally adequate meal trays TID, or the equivalent with supplements/snacks.        Monitoring/Evaluation  Progress toward goals will be monitored and evaluated per protocol.  Ashwini Petersen MS, RD, LD, CNSC    6C (beds 0411-0496) + 7C (beds 6594-4977) + ED   Available in Vocera by name or unit dietitian  No longer available via pager             referral with nutrition team due to patient's report of restricted diet due to IBS.

## 2024-04-08 NOTE — PROGRESS NOTES
EEG CLINICAL NEUROPHYSIOLOGY PRELIMINARY REPORT    First hour of video EEG reviewed. At times mildly slowed posterior dominant rhythm, at times desynchronized background while awake. Bilateral slowing during drowsiness. NO epileptiform discharges or seizures.    Findings consistent with mild diffuse encephalopathy. Thus far no epileptiform discharges or seizures. Will continue video-EEG monitoring. Full report to follow.    Berlin Khoury MD

## 2024-04-08 NOTE — CONSULTS
Cardiology Inpatient Consultation  April 8, 2024    Reason for Consult:  A cardiology consult was requested to provide clinical guidance regarding possible ACS    Assessment and Recommendation:  Non ischemic acute myocardial injury   Coronary artery disease   Seizure disorder   Chronic hypoxemia respiratory failure secondary to ILD  Acute on chronic Anemia.     Patient was evaluated by over night fellow, and we agree with his assessment. At this time, given he has not had any cardiac symptoms and has no ischemic changes on his EKG, rise and a fall on troponin is likely secondary to acute myocardial injury in the setting of possible demand ischemia from his seizure event, anemia and hypoxia. No need to continue trending troponin. For note,  he does have underlying subclinical CAD (based on CT findings), he needs ischemic evaluation done as outpatient.     Plan:  Recommend to start Aspirin 81mg and high-intensity statins for secondary prevention of CAD  Follow up with cardiology as outpatient for ischemic work up     Plan of care discussed with Dr. Villalobos, who agrees with above plan.    We will sign off. Thank you for consulting the cardiovascular services at the [facility]. Please do not hesitate to call us with any questions.     Morris Rico MD  Cardiology Fellow        HPI:   Trevor Larios is a 73 year old male PMH of dementia, ILD, AAA (followed by vascular surgery) seizure disorder and Crohn's disease  Patient is currently admitted following an episode of breakthrough seizures in the setting of missed medications. Of note his seizure episodes were complicated by profound hypoxia (SaO2 in 60s when found by EMS) which is now resolved and is currently on NC 2L.Cardiology was consulted due to elevated troponin and concerns of ACS.     Patient with history of dementia, history was partially limited. He refers that he has never been diagnosed with any cardiovascular condition.  "On interview with the patient he denies having any chest pain or pressure at the moment or when he was at home. Refers some shortness of breath while at rest but reinforces that is his baseline.      At the moment of my assessment. He was hemodynamically stable. His troponin was 149 on arrival and increased to 202 over three hours and then decreased to 154. His presenting EKG is sinus w/o ischemic changes. His CT chest is notable for chronic fibrotic interstitial lung disease in addition to moderate calcification of his coronary arteries (LAD and Lcx territories). TTE today revealed normal biventricular function without wall motion abnormalities. At the time of interview, the patient denies chest pain, orthopnea, PND, palpitations, lightheadedness, or syncope.     Review of Systems:    Complete review of systems was performed and negative except per HPI.    PMH:  Past Medical History:   Diagnosis Date    Crohn's disease (H)     Dementia (H) 2023    \"moderate.\"     Active Problems:  Patient Active Problem List    Diagnosis Date Noted    Shortness of breath 04/07/2024     Priority: Medium    Weakness 04/07/2024     Priority: Medium    Falls frequently 04/07/2024     Priority: Medium    SIRS (systemic inflammatory response syndrome) (H) 04/07/2024     Priority: Medium     Social History:  Social History     Tobacco Use    Smoking status: Never    Smokeless tobacco: Never   Vaping Use    Vaping Use: Never used     Family History:  No family history on file.    Medications:  Current Facility-Administered Medications   Medication Dose Route Frequency Provider Last Rate Last Admin    cyanocobalamin (VITAMIN B-12) sublingual tablet 1,000 mcg  1,000 mcg Sublingual Daily Amita Edwards PA-C   1,000 mcg at 04/08/24 0806    enoxaparin ANTICOAGULANT (LOVENOX) injection 30 mg  30 mg Subcutaneous Q24H Amita Edwards PA-C   30 mg at 04/08/24 0001    ferrous sulfate (FEROSUL) tablet 325 mg  325 mg Oral Daily Amita Edwards" DIANA CORRALES   325 mg at 04/08/24 0805    levETIRAcetam (KEPPRA) tablet 750 mg  750 mg Oral BID Amita Edwards PA-C   750 mg at 04/08/24 0806    midodrine (PROAMATINE) tablet 10 mg  10 mg Oral TID Amita Edwards PA-C   10 mg at 04/08/24 0620    multivitamin w/minerals (THERA-VIT-M) tablet 1 tablet  1 tablet Oral Daily Amita Edwards PA-C   1 tablet at 04/08/24 0805    piperacillin-tazobactam (ZOSYN) 4.5 g vial to attach to  mL bag  4.5 g Intravenous Q6H Amita Edwards PA-C   Stopped at 04/08/24 0700    polyethylene glycol (MIRALAX) Packet 17 g  17 g Oral Daily Amita Edwards PA-C   17 g at 04/08/24 0806    senna-docusate (SENOKOT-S/PERICOLACE) 8.6-50 MG per tablet 1 tablet  1 tablet Oral At Bedtime Amita Edwards PA-C        sodium chloride (PF) 0.9% PF flush 3 mL  3 mL Intracatheter Q8H Amita Edwards PA-C   3 mL at 04/08/24 0614    thiamine (B-1) tablet 100 mg  100 mg Oral Daily Amita Edwards PA-C   100 mg at 04/08/24 0805    vancomycin (VANCOCIN) 750 mg in sodium chloride 0.9 % 250 mL intermittent infusion  750 mg Intravenous Q12H Ovi Cobb MD   Stopped at 04/08/24 1000       Current Facility-Administered Medications   Medication Dose Route Frequency Provider Last Rate Last Admin       Physical Exam:  Temp:  [98.3  F (36.8  C)-99.9  F (37.7  C)] 98.3  F (36.8  C)  Pulse:  [73-97] 73  Resp:  [26] 26  BP: ()/(58-68) 86/60  SpO2:  [93 %-96 %] 94 %    Intake/Output Summary (Last 24 hours) at 4/8/2024 1213  Last data filed at 4/8/2024 0500  Gross per 24 hour   Intake --   Output 1525 ml   Net -1525 ml     GEN: pleasant, no acute distress  HEENT: No discharge  EYES: no icterus  CV: RRR, normal s1/s2, no murmurs/rubs/s3/s4, no heave. JVP 5   CHEST: clear to ausculation bilaterally, no rales or wheezing  ABD: soft, non-tender, normal active bowel sounds  : no flank/suprapubic tenderness  EXTR: pulses present. No clubbing, cyanosis or edema.   NEURO: alert oriented,  "speech fluent/appropriate, motor grossly nonfocal  PSYCH: cooperative, affect appropriate, pleasant      Diagnostics:  All labs and imaging were reviewed, of note:    CMP  Recent Labs   Lab 04/08/24  0904 04/07/24  1704    137   POTASSIUM 4.0 4.1   CHLORIDE 105 107   CO2 24 21*   ANIONGAP 12 9   * 114*   BUN 23.6* 19.5   CR 0.77 0.68   GFRESTIMATED >90 >90   MALIHA 8.5* 8.4*   MAG  --  2.0   PROTTOTAL 6.8 6.7   ALBUMIN 3.5 3.4*   BILITOTAL 0.9 0.5   ALKPHOS 60 64   AST 39 32   ALT 18 17     CBC  Recent Labs   Lab 04/08/24  0904 04/07/24  1704   WBC 10.2 14.3*   RBC 2.77* 2.70*   HGB 9.4* 9.1*   HCT 30.0* 28.9*   * 107*   MCH 33.9* 33.7*   MCHC 31.3* 31.5   RDW 16.2* 16.5*    266     INR  Recent Labs   Lab 04/08/24  0904   INR 1.24*     Arterial Blood Gas  Recent Labs   Lab 04/07/24  1733   O2PER 21       No results found for: \"TROPI\", \"TROPONIN\", \"TROPR\", \"TROPN\"        Transthoracic echocardiogram 04/08/2024:  Interpretation Summary  The visual ejection fraction is 55-60%. Relative wall thickness is increased  consistent with concentric remodeling.  No regional wall motion abnormalities are seen.  Right ventricular function, chamber size, wall motion, and thickness are  normal.  Severe biatrial enlargement is present.  Trace to mild aortic insufficiency is present.  All other valves are normal.  IVC diameter <2.1 cm collapsing >50% with sniff suggests a normal RA pressure  of 3 mmHg.  No pericardial effusion is present.  There is no prior study for direct comparison.      "

## 2024-04-08 NOTE — PROGRESS NOTES
BP (!) 85/43   Pulse 84   Temp 98.3  F (36.8  C) (Oral)   Resp 26   SpO2 99%     A&Ox4. Has not been OOB this shift. Able to reposition self in bed. BP soft though pt frequently lays on his side, asymptomatic. Other vitals stable. Using urinal and bedpan, BM's loose. On EEG. Remains on IV abx.
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
## 2024-04-08 NOTE — PROGRESS NOTES
[facility] Medicine Cross Cover Note  2024 - 6:16 AM    Signout to check troponin(s).  Appears to have peaked - 149 -> 202 -> 154  EKG no change - some PVC(s) seen but no ischemia seen.    Exam:    /68   Pulse 93   Temp 98.3  F (36.8  C) (Oral)   Resp 26   SpO2 96%   Not otherwise examined    Assessment:    Acute coronary syndrome (ACS) with injury secondary to demand ischemia from stress of seizure and hypoxia with known substrate for coronary disease - less likely plaque rupture.    Plan:    Continue close observation and monitoring - cardiology to assess on follow up.    Denny Weinstein MD  [phone]  Mobile: [phone]

## 2024-04-09 ENCOUNTER — APPOINTMENT (OUTPATIENT)
Dept: NEUROLOGY | Facility: CLINIC | Age: 74
DRG: 871 | End: 2024-04-09
Payer: MEDICARE

## 2024-04-09 LAB
ALBUMIN SERPL BCG-MCNC: 3.2 G/DL (ref 3.5–5.2)
ALP SERPL-CCNC: 54 U/L (ref 40–150)
ALT SERPL W P-5'-P-CCNC: 16 U/L (ref 0–70)
ANION GAP SERPL CALCULATED.3IONS-SCNC: 9 MMOL/L (ref 7–15)
AST SERPL W P-5'-P-CCNC: 32 U/L (ref 0–45)
BILIRUB SERPL-MCNC: 0.6 MG/DL
BUN SERPL-MCNC: 26.6 MG/DL (ref 8–23)
CALCIUM SERPL-MCNC: 8.4 MG/DL (ref 8.8–10.2)
CHLORIDE SERPL-SCNC: 110 MMOL/L (ref 98–107)
CREAT SERPL-MCNC: 0.73 MG/DL (ref 0.67–1.17)
DEPRECATED HCO3 PLAS-SCNC: 24 MMOL/L (ref 22–29)
EGFRCR SERPLBLD CKD-EPI 2021: >90 ML/MIN/1.73M2
ERYTHROCYTE [DISTWIDTH] IN BLOOD BY AUTOMATED COUNT: 16.2 % (ref 10–15)
GLUCOSE SERPL-MCNC: 96 MG/DL (ref 70–99)
HCT VFR BLD AUTO: 29.1 % (ref 40–53)
HGB BLD-MCNC: 9.2 G/DL (ref 13.3–17.7)
MCH RBC QN AUTO: 34.1 PG (ref 26.5–33)
MCHC RBC AUTO-ENTMCNC: 31.6 G/DL (ref 31.5–36.5)
MCV RBC AUTO: 108 FL (ref 78–100)
MRSA DNA SPEC QL NAA+PROBE: NEGATIVE
PLATELET # BLD AUTO: 253 10E3/UL (ref 150–450)
POTASSIUM SERPL-SCNC: 3.6 MMOL/L (ref 3.4–5.3)
PROT SERPL-MCNC: 6.6 G/DL (ref 6.4–8.3)
RBC # BLD AUTO: 2.7 10E6/UL (ref 4.4–5.9)
SA TARGET DNA: POSITIVE
SODIUM SERPL-SCNC: 143 MMOL/L (ref 135–145)
VANCOMYCIN SERPL-MCNC: 10 UG/ML
WBC # BLD AUTO: 8 10E3/UL (ref 4–11)

## 2024-04-09 PROCEDURE — 250N000011 HC RX IP 250 OP 636: Performed by: PHYSICIAN ASSISTANT

## 2024-04-09 PROCEDURE — 250N000013 HC RX MED GY IP 250 OP 250 PS 637: Performed by: PHYSICIAN ASSISTANT

## 2024-04-09 PROCEDURE — 250N000011 HC RX IP 250 OP 636: Performed by: INTERNAL MEDICINE

## 2024-04-09 PROCEDURE — 120N000003 HC R&B IMCU UMMC

## 2024-04-09 PROCEDURE — 80202 ASSAY OF VANCOMYCIN: CPT | Performed by: INTERNAL MEDICINE

## 2024-04-09 PROCEDURE — 99233 SBSQ HOSP IP/OBS HIGH 50: CPT | Mod: GC | Performed by: STUDENT IN AN ORGANIZED HEALTH CARE EDUCATION/TRAINING PROGRAM

## 2024-04-09 PROCEDURE — 258N000003 HC RX IP 258 OP 636

## 2024-04-09 PROCEDURE — 250N000013 HC RX MED GY IP 250 OP 250 PS 637

## 2024-04-09 PROCEDURE — 95718 EEG PHYS/QHP 2-12 HR W/VEEG: CPT | Mod: GC | Performed by: PSYCHIATRY & NEUROLOGY

## 2024-04-09 PROCEDURE — 87641 MR-STAPH DNA AMP PROBE: CPT | Performed by: INTERNAL MEDICINE

## 2024-04-09 PROCEDURE — 258N000003 HC RX IP 258 OP 636: Performed by: INTERNAL MEDICINE

## 2024-04-09 PROCEDURE — 82247 BILIRUBIN TOTAL: CPT | Performed by: PHYSICIAN ASSISTANT

## 2024-04-09 PROCEDURE — 250N000011 HC RX IP 250 OP 636: Performed by: EMERGENCY MEDICINE

## 2024-04-09 PROCEDURE — 95711 VEEG 2-12 HR UNMONITORED: CPT

## 2024-04-09 PROCEDURE — 36415 COLL VENOUS BLD VENIPUNCTURE: CPT | Performed by: INTERNAL MEDICINE

## 2024-04-09 PROCEDURE — 258N000003 HC RX IP 258 OP 636: Performed by: EMERGENCY MEDICINE

## 2024-04-09 PROCEDURE — 999N000128 HC STATISTIC PERIPHERAL IV START W/O US GUIDANCE

## 2024-04-09 PROCEDURE — 99233 SBSQ HOSP IP/OBS HIGH 50: CPT | Performed by: INTERNAL MEDICINE

## 2024-04-09 PROCEDURE — 85027 COMPLETE CBC AUTOMATED: CPT | Performed by: PHYSICIAN ASSISTANT

## 2024-04-09 RX ORDER — LEVETIRACETAM 750 MG/1
750 TABLET ORAL DAILY
Status: DISCONTINUED | OUTPATIENT
Start: 2024-04-10 | End: 2024-04-11 | Stop reason: HOSPADM

## 2024-04-09 RX ORDER — COSYNTROPIN 0.25 MG/ML
250 INJECTION, POWDER, FOR SOLUTION INTRAMUSCULAR; INTRAVENOUS ONCE
Status: COMPLETED | OUTPATIENT
Start: 2024-04-10 | End: 2024-04-10

## 2024-04-09 RX ORDER — LEVETIRACETAM 500 MG/1
1000 TABLET ORAL
Status: DISCONTINUED | OUTPATIENT
Start: 2024-04-10 | End: 2024-04-11 | Stop reason: HOSPADM

## 2024-04-09 RX ORDER — GUAIFENESIN 600 MG/1
600 TABLET, EXTENDED RELEASE ORAL ONCE
Status: COMPLETED | OUTPATIENT
Start: 2024-04-09 | End: 2024-04-09

## 2024-04-09 RX ADMIN — ENOXAPARIN SODIUM 30 MG: 30 INJECTION SUBCUTANEOUS at 23:13

## 2024-04-09 RX ADMIN — POLYETHYLENE GLYCOL 3350 17 G: 17 POWDER, FOR SOLUTION ORAL at 07:56

## 2024-04-09 RX ADMIN — MIDODRINE HYDROCHLORIDE 10 MG: 5 TABLET ORAL at 19:31

## 2024-04-09 RX ADMIN — THIAMINE HCL TAB 100 MG 100 MG: 100 TAB at 07:58

## 2024-04-09 RX ADMIN — GUAIFENESIN 600 MG: 600 TABLET ORAL at 23:13

## 2024-04-09 RX ADMIN — VANCOMYCIN HYDROCHLORIDE 750 MG: 1 INJECTION, POWDER, LYOPHILIZED, FOR SOLUTION INTRAVENOUS at 23:13

## 2024-04-09 RX ADMIN — VANCOMYCIN HYDROCHLORIDE 750 MG: 1 INJECTION, POWDER, LYOPHILIZED, FOR SOLUTION INTRAVENOUS at 07:59

## 2024-04-09 RX ADMIN — Medication 1 TABLET: at 07:58

## 2024-04-09 RX ADMIN — ASPIRIN 81 MG CHEWABLE TABLET 81 MG: 81 TABLET CHEWABLE at 19:32

## 2024-04-09 RX ADMIN — PIPERACILLIN SODIUM AND TAZOBACTAM SODIUM 4.5 G: 4; .5 INJECTION, POWDER, LYOPHILIZED, FOR SOLUTION INTRAVENOUS at 00:04

## 2024-04-09 RX ADMIN — DOCUSATE SODIUM 50 MG AND SENNOSIDES 8.6 MG 1 TABLET: 8.6; 5 TABLET, FILM COATED ORAL at 23:13

## 2024-04-09 RX ADMIN — MIDODRINE HYDROCHLORIDE 10 MG: 5 TABLET ORAL at 14:28

## 2024-04-09 RX ADMIN — ACETAMINOPHEN 650 MG: 325 TABLET, FILM COATED ORAL at 09:50

## 2024-04-09 RX ADMIN — LEVETIRACETAM 750 MG: 750 TABLET, FILM COATED ORAL at 07:59

## 2024-04-09 RX ADMIN — FERROUS SULFATE TAB 325 MG (65 MG ELEMENTAL FE) 325 MG: 325 (65 FE) TAB at 07:59

## 2024-04-09 RX ADMIN — SODIUM CHLORIDE, POTASSIUM CHLORIDE, SODIUM LACTATE AND CALCIUM CHLORIDE 250 ML: 600; 310; 30; 20 INJECTION, SOLUTION INTRAVENOUS at 15:22

## 2024-04-09 RX ADMIN — MIDODRINE HYDROCHLORIDE 10 MG: 5 TABLET ORAL at 07:59

## 2024-04-09 RX ADMIN — LEVETIRACETAM 750 MG: 750 TABLET, FILM COATED ORAL at 19:32

## 2024-04-09 RX ADMIN — PIPERACILLIN SODIUM AND TAZOBACTAM SODIUM 4.5 G: 4; .5 INJECTION, POWDER, LYOPHILIZED, FOR SOLUTION INTRAVENOUS at 06:09

## 2024-04-09 RX ADMIN — PIPERACILLIN SODIUM AND TAZOBACTAM SODIUM 4.5 G: 4; .5 INJECTION, POWDER, LYOPHILIZED, FOR SOLUTION INTRAVENOUS at 18:46

## 2024-04-09 RX ADMIN — Medication 1000 MCG: at 07:58

## 2024-04-09 RX ADMIN — SODIUM CHLORIDE, POTASSIUM CHLORIDE, SODIUM LACTATE AND CALCIUM CHLORIDE 250 ML: 600; 310; 30; 20 INJECTION, SOLUTION INTRAVENOUS at 01:25

## 2024-04-09 RX ADMIN — PIPERACILLIN SODIUM AND TAZOBACTAM SODIUM 4.5 G: 4; .5 INJECTION, POWDER, LYOPHILIZED, FOR SOLUTION INTRAVENOUS at 12:00

## 2024-04-09 RX ADMIN — PIPERACILLIN SODIUM AND TAZOBACTAM SODIUM 4.5 G: 4; .5 INJECTION, POWDER, LYOPHILIZED, FOR SOLUTION INTRAVENOUS at 23:13

## 2024-04-09 RX ADMIN — VANCOMYCIN HYDROCHLORIDE 750 MG: 1 INJECTION, POWDER, LYOPHILIZED, FOR SOLUTION INTRAVENOUS at 17:43

## 2024-04-09 ASSESSMENT — ACTIVITIES OF DAILY LIVING (ADL)
ADLS_ACUITY_SCORE: 47
ADLS_ACUITY_SCORE: 47
DEPENDENT_IADLS:: INDEPENDENT
ADLS_ACUITY_SCORE: 47
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
## 2024-04-09 NOTE — PHARMACY-VANCOMYCIN DOSING SERVICE
"Pharmacy Vancomycin Note  Date of Service 2024  Patient's  1950   73 year old, male    Indication: Sepsis  Day of Therapy: 3  Current vancomycin regimen:  750 mg IV q12h  Current vancomycin monitoring method: AUC  Current vancomycin therapeutic monitoring goal: 400-600 mg*h/L    Insight[employer] Prediction of Current Vancomycin Regimen    Regimen: 750 mg IV every 12 hours.  Start time: 19:59 on 2024  Exposure target: AUC24 (range)400-600 mg/L.hr   AUC24,ss: 364 mg/L.hr  Probability of AUC24 > 400: 30 %  Ctrough,ss: 10.8 mg/L  Probability of Ctrough,ss > 20: 1 %    Current estimated CrCl = Estimated Creatinine Clearance: 83.4 mL/min (based on SCr of 0.73 mg/dL).    Creatinine for last 3 days  2024:  5:04 PM Creatinine 0.68 mg/dL  2024:  9:04 AM Creatinine 0.77 mg/dL  2024:  5:22 AM Creatinine 0.73 mg/dL    Recent Vancomycin Levels (past 3 days)  2024:  5:22 AM Vancomycin 10.0 ug/mL    Vancomycin IV Administrations (past 72 hours)                     vancomycin (VANCOCIN) 750 mg in sodium chloride 0.9 % 250 mL intermittent infusion (mg) 750 mg New Bag 24 0759     750 mg New Bag 24 194     750 mg New Bag  0812    vancomycin (VANCOCIN) 1,250 mg in 0.9% NaCl 250 mL intermittent infusion (mg) 1,250 mg New Bag 24                    Nephrotoxins and other renal medications (From now, onward)      Start     Dose/Rate Route Frequency Ordered Stop    24 0800  vancomycin (VANCOCIN) 750 mg in sodium chloride 0.9 % 250 mL intermittent infusion         750 mg  over 90 Minutes Intravenous EVERY 12 HOURS 24 0400  piperacillin-tazobactam (ZOSYN) 4.5 g vial to attach to  mL bag        Note to Pharmacy: For [facility], [facility] and [facility]: For Zosyn-naive patients, use the \"Zosyn initial dose + extended infusion\" order panel.    4.5 g  over 30 Minutes Intravenous EVERY 6 HOURS 24 2242                 Contrast Orders - past 72 hours (72h ago, onward) "      None            Interpretation of levels and current regimen:  Vancomycin level is reflective of AUC less than 400    Has serum creatinine changed greater than 50% in last 72 hours: No    Urine output:  unable to determine    Renal Function: Stable    Insight[employer] Prediction of Planned New Vancomycin Regimen    Regimen: 750 mg IV every 8 hours.  Start time: 15:59 on 04/09/2024  Exposure target: AUC24 (range)400-600 mg/L.hr   AUC24,ss: 541 mg/L.hr  Probability of AUC24 > 400: 96 %  Ctrough,ss: 17.8 mg/L  Probability of Ctrough,ss > 20: 32 %  Probability of nephrotoxicity (Lodise KELLIE 2009): 14 %      Plan:  Increase Dose to 750 mg IV q12hr  Vancomycin monitoring method: AUC  Vancomycin therapeutic monitoring goal: 400-600 mg*h/L  Pharmacy will check vancomycin levels as appropriate in 3-5 Days.  Serum creatinine levels will be ordered daily for the first week of therapy and at least twice weekly for subsequent weeks.    Kiet Romero [facility]

## 2024-04-09 NOTE — PLAN OF CARE
Goal Outcome Evaluation:              Neuro: LOCx4, neruos intact. Forgetful at times. On seizure precautions. EEG monitoring discontinued this afternoon  Cardiac: no tele orders. LR bolus given for low Bps in the80s/40s. Responded well and scheduled midorine given   Respiratory: sating >90 on 2L NC, moderate secretions. Attempted to wean to RA  Diet/appetite: regular Diet,  needs soft foods d/t missing teeth  GI/:  voids via urinal. No BM this shift  Activity:  Assist x1  Pain: tylenol given once for headache   Skin:  WNL.  Lines: 2 L PIVs              Cosyntropin srimulation study tomorrow. Adrenal corticotropin lab  to be taken before ad after med.       Time of notification: 2:26    Provider notified: Dr. Real  Patient status: BP 80/47 map 55. Second bp 74/57 Map 64. Going to give scheduled midodrine now. Pt feels a little light headed  Orders received: 250 bolus LR

## 2024-04-09 NOTE — PROGRESS NOTES
Overnight CC;  Paged by RN that SBP is 87,patient remained asymptomatic  Chart review shows ongoing hypotension of unclear etiology but in setting of possible infection, de conditioning/malnutrition and suspected adrenal insufficiency. Patient is also on keppra possibly contributing. Current BP is lower than baseline. Is on Midodrine 10mg TID. Fluid supplement needs careful approach due to recent AHRF and pulm condition. Current I/O is -1.6L yesterday.     - will give one time stress dose steroid. Discontinued this when discovered in the note that cosyntropin stimulation test may be done in AM.   - Will continue to monitor over the next hour or so. Cycle BP q 30 minutes x 2 hours. If Hypotension persist, will give small fluid cautiously.

## 2024-04-09 NOTE — PROGRESS NOTES
Transfer  Transferred from: ED  Via:bed  Reason for transfer: Pt appropriate for 6B  Family: Aware of transfer  Belongings: Received with pt  Chart: Received with pt  Medications: Meds received from old unit with pt  Code Status verified on armband: yes  2 RN Skin Assessment Completed By: Romel RN/La Nena RN  Suction/Ambu bag/Flowmeter at bedside: yes    Report received from: Sandra WESTON  Pt status: stable on EEG monitoring and 3L NC

## 2024-04-09 NOTE — PROGRESS NOTES
Physician Attestation   I, Leonardo Vogel MD, was present with the medical/WILL student who participated in the service and in the documentation of the note.  I have verified the history and personally performed the physical exam and medical decision making.  I agree with the assessment and plan of care as documented in the note.      Key findings:   Pt with idiopathic epilepsy who presented with a breakthrough seizure.  Due to difficulty with getting medication prescriptions filled, and due to the increased risk of worsening cognitive impairment in elderly patients on Depakote, we have switched the patient to Keppra monotherapy.  He was on video EEG for over 24 hours without the identification of seizures or epileptiform activity on this medication.  We will check a Keppra level, continue this modest increase in Keppra dose of 750/100, and have him follow-up with outpatient neurology.  From neurology perspective, okay to discharge to home after the Keppra level is assessed on 4/10.    Please see A&P for additional details of medical decision making.    I have personally reviewed the following data over the past 24 hrs:    8.0  \   9.2 (L)   / 253     143 110 (H) 26.6 (H) /  96   3.6 24 0.73 \     ALT: 16 AST: 32 AP: 54 TBILI: 0.6   ALB: 3.2 (L) TOT PROTEIN: 6.6 LIPASE: N/A         Leonardo Vogel MD  Date of Service (when I saw the patient): 24     Billed as a level 3 visit due to patient presenting with an acute presentation leading to loss of bodily function.  I discussed the clinical decision making with the patient, his daughter and son-in-law who are at bedside, the neurology resident team, reviewed labs as outlined above.          [facility]  Neurology Progress Note    Patient Name:  Trevor Larios  MRN:  [id number]    :  1950  Date of Admission:  2024  Date of Service:  2024  Hospital Day: 3     Interval History/24-hour Events    Interval Events:  Patient is interviewed while laying in bed this morning. He shares he was unaware that a new medication was started, but denies any adverse effects. He states that his sleep has been better. Denies any headache, weakness, numbness, tingling, or vision changes.     Today's Changes:  -Discontinue EEG today  -Ordered Keppra level to be obtained tomorrow AM prior to Keppra dose  - Increased keppra to 750 morning and 1000 at night, starting 4/10 (ordered for you)     Assessment & Plan    Trevor Larios is a right-handed 73 year old male with PMHx of seizure disorder previously on Depakote and Zonegram, cognitive impairment, and Crohn's disease who arrived to the ED from home on 4/7/2024 due to altered mental status, possible fall, and possible seizure in the setting of medication non-compliance and poor continuity of care. He recently moved to [place] a few weeks ago from [place] and lives alone. Neurological examination with intact orientation to person, place, and situation without any focal motor, sensory, or coordination deficits. PTA anti-seizure medications (Depakote and Zonegram) have been discontinued due to concerns for worsening cognitive impairment and Keppra has been initiated. MRI brain on 4/8/24 showed moderate generalized volume loss and mild chronic small vessel ischemic disease but no seizure nidus. Will continue with EEG for further investigation of etiology of seizures, will consider discontinuing pending report later today or tomorrow. In the meantime, we will coordinate home services upon discharge with the help of .    Recommendations:  -EEG completed, discontinuing today (4/9/24)  -Discontinue PTA Depakote and Zonegram  -s/p Loading dose of Keppra 2g IV on 4/7  -Keppra increased to 750mg/ 1000  -Obtain trough Keppra level in AM on 4/10 prior to Keppra dose  -PT/OT evaluation for fall-risk  -Coordinate primary care and home health services as [facility]Ordered by Neurology]    Neurology will continue to follow.    Thank you for allowing the neurology service to participate in the care of Trevor Larios.  Please contact us with questions.      This patient was discussed with the neurology attending faculty, Dr. Leonardo Vogel.    Jacki John, MS3    Resident/Fellow Attestation   I, William Lim DO, was present with the medical/WILL student who participated in the service and in the documentation of the note.  I have verified the history and personally performed the physical exam and medical decision making. I agree with the assessment and plan of care as documented in the note.      William Lim DO  Neurology Resident PGY3  04/09/2024  Neurology Pager: [phone]       Physical Exam   Temp:  [97.6  F (36.4  C)-98.6  F (37  C)] 97.6  F (36.4  C)  Pulse:  [64-92] 64  Resp:  [22-24] 22  BP: ()/(32-71) 95/53  SpO2:  [89 %-99 %] 93 %    I/O last 3 completed shifts:  In: 360 [P.O.:360]  Out: 200 [Urine:200]     Neurologic  Mental Status:  alert, oriented x 3, follows commands, speech clear and fluent, naming and repetition normal  Cranial Nerves:  visual fields intact, EOMI with normal smooth pursuit, facial sensation intact and symmetric, facial movements symmetric, hearing not formally tested but intact to conversation, no dysarthria, shoulder shrug strong bilaterally, tongue protrusion midline  Motor:  normal muscle tone and bulk, no abnormal movements, able to move all limbs spontaneously, strength 5/5 throughout upper and lower extremities  Reflexes:  2+ and symmetric throughout, no clonus, diminished achilles tendon reflex, negative Armstrong's sign  Sensory:  light touch sensation intact and symmetric throughout upper and lower extremities  Coordination:  normal finger-to-nose and heel-to-shin bilaterally without dysmetria  Station/Gait:  deferred     Investigations   Labs  BMP  Recent Labs   Lab  "04/09/24  0522 04/08/24  0904 04/07/24  1704    141 137   POTASSIUM 3.6 4.0 4.1   CHLORIDE 110* 105 107   CO2 24 24 21*   BUN 26.6* 23.6* 19.5   CR 0.73 0.77 0.68   MALIHA 8.4* 8.5* 8.4*       Liver Panel  Recent Labs   Lab 04/09/24  0522 04/08/24  0904 04/07/24  1704   PROTTOTAL 6.6 6.8 6.7   ALBUMIN 3.2* 3.5 3.4*   BILITOTAL 0.6 0.9 0.5   ALKPHOS 54 60 64   AST 32 39 32   ALT 16 18 17       CBC  Recent Labs   Lab 04/09/24 0522 04/08/24 0904 04/07/24  1704   WBC 8.0 10.2 14.3*   HGB 9.2* 9.4* 9.1*    248 266       COAGS  Recent Labs   Lab 04/08/24  0904   INR 1.24*       ABG  No results for input(s): \"PH\", \"PCO2\", \"PO2\", \"HCO3\" in the last 168 hours.    CRP/ESR  No results for input(s): \"CRP\" in the last 168 hours.    Invalid input(s): \"ESR\"    CSF  No results for input(s): \"CGLU\", \"CTP\" in the last 168 hours.    Invalid input(s): \"CCSF\"    MICRO  No results for input(s): \"CULT\" in the last 168 hours.    LIPIDS  No results for input(s): \"CHOL\", \"HDL\", \"LDL\", \"TRIG\", \"CHOLHDLRATIO\" in the last 33517 hours.    A1C    No lab results found.    Imaging  I have personally reviewed most recent and pertinent labs, tests, and radiological images; relevant findings per HPI.     "
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
## 2024-04-09 NOTE — PLAN OF CARE
[id number]  Vitals:  Blood pressure 99/71, pulse 77, temperature 98.1  F (36.7  C), temperature source Oral, resp. rate 24, weight 67.4 kg (148 lb 9.4 oz), SpO2 96%.  Neuro: A/Ox4, q4 neuro checks consistent. On seizure precautions. EEG monitoring overnight  Cardiac: SR w/ PVC's. SPB 70's/80's, LR bolus given.  Respiratory: sating >92 on 3L NC, persistent SOB, some secretions  Diet/appetite: regular Diet, tolerating well  GI/:  voids ab dimple via urinal. No BM this shift  Activity:  Assist x1  Pain: Denies  Skin: 2 RN skin check completed. WNL.  Lines: 2 L PIVs sl    Plan: Continue abx, EEG monitor per POC. Cont to monitor pt condition and notify team of changes per poc.    Problem: Adult Inpatient Plan of Care  Goal: Absence of Hospital-Acquired Illness or Injury  Intervention: Identify and Manage Fall Risk  Recent Flowsheet Documentation  Taken 4/8/2024 2325 by Lesly Panchal RN  Safety Promotion/Fall Prevention:   activity supervised   clutter free environment maintained   increase visualization of patient   patient and family education   room near nurse's station   safety round/check completed  Intervention: Prevent Skin Injury  Recent Flowsheet Documentation  Taken 4/8/2024 2325 by Lesly Panchal, RN  Body Position: position changed independently  Skin Protection: adhesive use limited  Device Skin Pressure Protection:   absorbent pad utilized/changed   adhesive use limited   tubing/devices free from skin contact  Intervention: Prevent and Manage VTE (Venous Thromboembolism) Risk  Recent Flowsheet Documentation  Taken 4/8/2024 2325 by Lesly Panchal, RN  VTE Prevention/Management: SCDs (sequential compression devices) off  Intervention: Prevent Infection  Recent Flowsheet Documentation  Taken 4/8/2024 2325 by Lesly Panchal, RN  Infection Prevention:   cohorting utilized   environmental surveillance performed   equipment surfaces disinfected   hand hygiene promoted   personal protective equipment utilized   rest/sleep  promoted   single patient room provided

## 2024-04-09 NOTE — PLAN OF CARE
Goal Outcome Evaluation:      Plan of Care Reviewed With: child    Overall Patient Progress: no changeOverall Patient Progress: no change    Outcome Evaluation: SW completed initial assessment. SW will need to follow for community resources guidance.

## 2024-04-09 NOTE — PROVIDER NOTIFICATION
Provider notified: JERMAIN Kothari    Time of notification: 0038  Patient status: FYI pt SBP 87. Pt non-symptomatic.  Time of response: 0048  Orders received: cont to monitor pt, potential fluids if low spb persists

## 2024-04-09 NOTE — PROGRESS NOTES
Transfer  Transferred to: 6B  Via:bed and flyer  Reason for transfer: pt appropriate for 6B  Belongings: Packed and sent with pt  Chart: Delivered with pt to next unit  Medications: Meds sent to new unit with pt  Report given to: La Nena WESTON  Pt status: stable on EEG monitoring and 3 L NC

## 2024-04-10 ENCOUNTER — APPOINTMENT (OUTPATIENT)
Dept: PHYSICAL THERAPY | Facility: CLINIC | Age: 74
DRG: 871 | End: 2024-04-10
Attending: PHYSICIAN ASSISTANT
Payer: MEDICARE

## 2024-04-10 ENCOUNTER — APPOINTMENT (OUTPATIENT)
Dept: OCCUPATIONAL THERAPY | Facility: CLINIC | Age: 74
DRG: 871 | End: 2024-04-10
Attending: PHYSICIAN ASSISTANT
Payer: MEDICARE

## 2024-04-10 LAB
ALBUMIN SERPL BCG-MCNC: 3.1 G/DL (ref 3.5–5.2)
ALP SERPL-CCNC: 50 U/L (ref 40–150)
ALT SERPL W P-5'-P-CCNC: 18 U/L (ref 0–70)
ANION GAP SERPL CALCULATED.3IONS-SCNC: 8 MMOL/L (ref 7–15)
AST SERPL W P-5'-P-CCNC: 25 U/L (ref 0–45)
BILIRUB SERPL-MCNC: 0.5 MG/DL
BUN SERPL-MCNC: 19.3 MG/DL (ref 8–23)
CALCIUM SERPL-MCNC: 8 MG/DL (ref 8.8–10.2)
CHLORIDE SERPL-SCNC: 110 MMOL/L (ref 98–107)
CORTICOSTER 1H P 250 UG ACTH SERPL-SCNC: 25.9 UG/DL
CORTIS SERPL-MCNC: 9.6 UG/DL
CREAT SERPL-MCNC: 0.64 MG/DL (ref 0.67–1.17)
DEPRECATED HCO3 PLAS-SCNC: 23 MMOL/L (ref 22–29)
EGFRCR SERPLBLD CKD-EPI 2021: >90 ML/MIN/1.73M2
ERYTHROCYTE [DISTWIDTH] IN BLOOD BY AUTOMATED COUNT: 15.9 % (ref 10–15)
GLUCOSE SERPL-MCNC: 97 MG/DL (ref 70–99)
HCT VFR BLD AUTO: 25.7 % (ref 40–53)
HGB BLD-MCNC: 7.9 G/DL (ref 13.3–17.7)
LEVETIRACETAM SERPL-MCNC: 14.6 ΜG/ML (ref 10–40)
MCH RBC QN AUTO: 33.5 PG (ref 26.5–33)
MCHC RBC AUTO-ENTMCNC: 30.7 G/DL (ref 31.5–36.5)
MCV RBC AUTO: 109 FL (ref 78–100)
PLATELET # BLD AUTO: 310 10E3/UL (ref 150–450)
POTASSIUM SERPL-SCNC: 4 MMOL/L (ref 3.4–5.3)
PROT SERPL-MCNC: 6.2 G/DL (ref 6.4–8.3)
RBC # BLD AUTO: 2.36 10E6/UL (ref 4.4–5.9)
SODIUM SERPL-SCNC: 141 MMOL/L (ref 135–145)
WBC # BLD AUTO: 7.4 10E3/UL (ref 4–11)

## 2024-04-10 PROCEDURE — 250N000013 HC RX MED GY IP 250 OP 250 PS 637: Performed by: PHYSICIAN ASSISTANT

## 2024-04-10 PROCEDURE — 97165 OT EVAL LOW COMPLEX 30 MIN: CPT | Mod: GO

## 2024-04-10 PROCEDURE — 250N000011 HC RX IP 250 OP 636: Mod: JZ | Performed by: INTERNAL MEDICINE

## 2024-04-10 PROCEDURE — 250N000013 HC RX MED GY IP 250 OP 250 PS 637: Performed by: STUDENT IN AN ORGANIZED HEALTH CARE EDUCATION/TRAINING PROGRAM

## 2024-04-10 PROCEDURE — 99232 SBSQ HOSP IP/OBS MODERATE 35: CPT | Performed by: INTERNAL MEDICINE

## 2024-04-10 PROCEDURE — 97161 PT EVAL LOW COMPLEX 20 MIN: CPT | Mod: GP

## 2024-04-10 PROCEDURE — 120N000003 HC R&B IMCU UMMC

## 2024-04-10 PROCEDURE — 85027 COMPLETE CBC AUTOMATED: CPT | Performed by: PHYSICIAN ASSISTANT

## 2024-04-10 PROCEDURE — 250N000013 HC RX MED GY IP 250 OP 250 PS 637: Performed by: INTERNAL MEDICINE

## 2024-04-10 PROCEDURE — 36415 COLL VENOUS BLD VENIPUNCTURE: CPT | Performed by: INTERNAL MEDICINE

## 2024-04-10 PROCEDURE — 99232 SBSQ HOSP IP/OBS MODERATE 35: CPT | Mod: GC | Performed by: PSYCHIATRY & NEUROLOGY

## 2024-04-10 PROCEDURE — 80177 DRUG SCRN QUAN LEVETIRACETAM: CPT

## 2024-04-10 PROCEDURE — 82533 TOTAL CORTISOL: CPT | Performed by: INTERNAL MEDICINE

## 2024-04-10 PROCEDURE — 80053 COMPREHEN METABOLIC PANEL: CPT | Performed by: PHYSICIAN ASSISTANT

## 2024-04-10 PROCEDURE — 97530 THERAPEUTIC ACTIVITIES: CPT | Mod: GP

## 2024-04-10 PROCEDURE — 250N000011 HC RX IP 250 OP 636: Performed by: PHYSICIAN ASSISTANT

## 2024-04-10 PROCEDURE — 97530 THERAPEUTIC ACTIVITIES: CPT | Mod: GO

## 2024-04-10 PROCEDURE — 36415 COLL VENOUS BLD VENIPUNCTURE: CPT

## 2024-04-10 PROCEDURE — 97116 GAIT TRAINING THERAPY: CPT | Mod: GP

## 2024-04-10 RX ORDER — DOXYCYCLINE 100 MG/1
100 CAPSULE ORAL EVERY 12 HOURS SCHEDULED
Status: CANCELLED | OUTPATIENT
Start: 2024-04-10

## 2024-04-10 RX ORDER — GUAIFENESIN 600 MG/1
600 TABLET, EXTENDED RELEASE ORAL 2 TIMES DAILY
Status: DISCONTINUED | OUTPATIENT
Start: 2024-04-10 | End: 2024-04-11 | Stop reason: HOSPADM

## 2024-04-10 RX ADMIN — GUAIFENESIN 600 MG: 600 TABLET ORAL at 20:43

## 2024-04-10 RX ADMIN — PIPERACILLIN SODIUM AND TAZOBACTAM SODIUM 4.5 G: 4; .5 INJECTION, POWDER, LYOPHILIZED, FOR SOLUTION INTRAVENOUS at 05:55

## 2024-04-10 RX ADMIN — FERROUS SULFATE TAB 325 MG (65 MG ELEMENTAL FE) 325 MG: 325 (65 FE) TAB at 07:45

## 2024-04-10 RX ADMIN — MIDODRINE HYDROCHLORIDE 10 MG: 5 TABLET ORAL at 14:57

## 2024-04-10 RX ADMIN — Medication 1 TABLET: at 07:45

## 2024-04-10 RX ADMIN — THIAMINE HCL TAB 100 MG 100 MG: 100 TAB at 07:45

## 2024-04-10 RX ADMIN — MIDODRINE HYDROCHLORIDE 10 MG: 5 TABLET ORAL at 20:44

## 2024-04-10 RX ADMIN — MIDODRINE HYDROCHLORIDE 10 MG: 5 TABLET ORAL at 07:45

## 2024-04-10 RX ADMIN — ASPIRIN 81 MG CHEWABLE TABLET 81 MG: 81 TABLET CHEWABLE at 20:43

## 2024-04-10 RX ADMIN — GUAIFENESIN 600 MG: 600 TABLET ORAL at 09:55

## 2024-04-10 RX ADMIN — Medication 1000 MCG: at 08:00

## 2024-04-10 RX ADMIN — LEVETIRACETAM 1000 MG: 500 TABLET, FILM COATED ORAL at 20:43

## 2024-04-10 RX ADMIN — AMOXICILLIN AND CLAVULANATE POTASSIUM 1 TABLET: 875; 125 TABLET, FILM COATED ORAL at 20:43

## 2024-04-10 RX ADMIN — LEVETIRACETAM 750 MG: 750 TABLET, FILM COATED ORAL at 07:45

## 2024-04-10 RX ADMIN — AMOXICILLIN AND CLAVULANATE POTASSIUM 1 TABLET: 875; 125 TABLET, FILM COATED ORAL at 09:55

## 2024-04-10 RX ADMIN — COSYNTROPIN 250 MCG: 0.25 INJECTION, POWDER, LYOPHILIZED, FOR SOLUTION INTRAMUSCULAR; INTRAVENOUS at 04:26

## 2024-04-10 ASSESSMENT — ACTIVITIES OF DAILY LIVING (ADL)
ADLS_ACUITY_SCORE: 47

## 2024-04-10 NOTE — PROGRESS NOTES
"   04/10/24 1500   Appointment Info   Signing Clinician's Name / Credentials (OT) Mle Raymundo, OTRL   Living Environment   People in Home alone   Current Living Arrangements apartment  (senior independent living)   Home Accessibility no concerns  (elevator access)   Transportation Anticipated family or friend will provide   Living Environment Comments pt reports he lives alone in a senior IND living facility. Pt reports walk-in shower with built in bench. Pt daughter is very supportive.   Self-Care   Usual Activity Tolerance good   Current Activity Tolerance moderate   Equipment Currently Used at Home cane, straight;shower chair   Fall history within last six months yes   Number of times patient has fallen within last six months 1   Activity/Exercise/Self-Care Comment pt reports IND with mobility at baseline, uses a cane when \"my legs feel tired\". Pt reports he does ADLs IND'ly, simple cooking in his apartment.   General Information   Onset of Illness/Injury or Date of Surgery 04/07/24   Referring Physician Amita Edwards PA-C   Patient/Family Therapy Goal Statement (OT) to go home   Additional Occupational Profile Info/Pertinent History of Current Problem per chart Trevor Larios is a 73 year old male with history of seizure disorder, Crohn's disease, tobacco use disorder, aortic aneurysm, malnutrition, hypotension, chronic anemia, and mild to moderate cognitive impairment who was admitted with recurrent seizures, acute hypoxic respiratory failure, and possible pneumonia.   Existing Precautions/Restrictions fall   Limitations/Impairments safety/cognitive  (per chart, baseline)   Cognitive Status Examination   Orientation Status orientation to person, place and time   Follows Commands WNL   Cognitive Status Comments pt follows commands appropriately, appears to have good-fair safety awareness   Sensory   Sensory Quick Adds sensation intact   Posture   Posture forward head position   Range of Motion " Comprehensive   General Range of Motion bilateral upper extremity ROM WFL   Strength Comprehensive (MMT)   Comment, General Manual Muscle Testing (MMT) Assessment BUE WFL   Coordination   Upper Extremity Coordination No deficits were identified   Bed Mobility   Bed Mobility supine-sit   Supine-Sit Stephen (Bed Mobility) independent   Transfers   Transfers sit-stand transfer;toilet transfer;shower transfer   Sit-Stand Transfer   Sit-Stand Stephen (Transfers) supervision   Shower Transfer   Type (Shower Transfer) lateral   Stephen Level (Shower Transfer) contact guard   Shower Transfer Comments per clinical judgement   Toilet Transfer   Type (Toilet Transfer) sit-stand   Stephen Level (Toilet Transfer) supervision   Toilet Transfer Comments per clinical judgement   Balance   Balance Comments good standing balance, ambulates with CGA and no AD, mildly unsteady though no observed LOB   Activities of Daily Living   BADL Assessment/Intervention bathing;lower body dressing;toileting   Bathing Assessment/Intervention   Stephen Level (Bathing) minimum assist (75% patient effort)   Comment, (Bathing) per clinical judgement   Lower Body Dressing Assessment/Training   Stephen Level (Lower Body Dressing) set up   Toileting   Comment, (Toileting) per clinical judgement   Stephen Level (Toileting) set up   Clinical Impression   Criteria for Skilled Therapeutic Interventions Met (OT) Yes, treatment indicated   OT Diagnosis pt is below baseline for ADLs   OT Problem List-Impairments impacting ADL problems related to;activity tolerance impaired;balance;strength;pain;cognition   Assessment of Occupational Performance 1-3 Performance Deficits   Identified Performance Deficits bathing, functional mobility, cooking, home managment   Planned Therapy Interventions (OT) ADL retraining;IADL retraining;cognition;transfer training   Clinical Decision Making Complexity (OT) problem focused assessment/low  complexity   Risk & Benefits of therapy have been explained evaluation/treatment results reviewed;care plan/treatment goals reviewed;risks/benefits reviewed;current/potential barriers reviewed;participants voiced agreement with care plan;participants included;patient   Clinical Impression Comments Pt presents below baseline, limited by activity tolerance and mild unsteadiness. pt will benefit from skilled OT to progress toward PLOF   OT Goals   Therapy Frequency (OT) 5 times/week   OT Predicted Duration/Target Date for Goal Attainment 04/19/24   OT Goals Lower Body Dressing;Lower Body Bathing;Toilet Transfer/Toileting;Cognition   OT: Lower Body Dressing Independent   OT: Lower Body Bathing Modified independent   OT: Toilet Transfer/Toileting Independent;toilet transfer;cleaning and garment management   OT: Cognitive Patient/caregiver will verbalize understanding of cognitive assessment results/recommendations as needed for safe discharge planning   Interventions   Interventions Quick Adds Therapeutic Activity   Therapeutic Activities   Therapeutic Activity Minutes (50967) 24   Symptoms noted during/after treatment fatigue   Treatment Detail/Skilled Intervention Facilitated functional transfers and hallway ambulation to progress pt activity tolerance for ADLs. Pt greeted in supine, agreeable to OT session. Pt able to don socks while long sitting in bed. Pt denies need to use bathroom. pt supine > sit EOB IND'ly. Th setting up lines/portable monitor prior to ambulating. Pt STS from EOB with SBA. pt ambulating 40ft with SBA-CGA and IV pole. Pt SPO2 into low 80s with activity, quickly recovering to 90s with seated rest. pt placed on 1L via NC for remainder of activity, with SPO2 remaining above 90%. pt STS with SBA and ambulating additional x 150ft with CGA. Pt progressing from IV pole for stability > no AD for approx 60ft. Pt with no overt LOB, though a mildly unstable, and 1-2 scissor steps. Pt agreeable to trial FWW  "with OT or PT in future sessions \"if it will get me home\". Brief education provided on safety at home with heavy tasks, pt notes family may be able to A. Pt returns to bed with SBA.   OT Discharge Planning   OT Plan standing ADLs, toilet transfers, dressing, monitor cog?   OT Discharge Recommendation (DC Rec) home with assist;home with home care occupational therapy   OT Rationale for DC Rec Pt mobilizing with CGA this date, anticipate near functional baseline. pt would benefit from HH therapies at  d/c to cont progressing IND/safety. This Th would also recommend A for heavy tasks as pt improves IND - med management, laundry, cleaning etc.   OT Brief overview of current status CGA w/FWW   Total Session Time   Timed Code Treatment Minutes 24   Total Session Time (sum of timed and untimed services) 24     "

## 2024-04-10 NOTE — PROGRESS NOTES
04/10/24 1000   Appointment Info   Signing Clinician's Name / Credentials (PT) Rafy Hammond DPT   Living Environment   People in Home alone   Current Living Arrangements apartment  (senior independent living)   Home Accessibility no concerns  (elevator access)   Transportation Anticipated family or friend will provide   Living Environment Comments Patient lives alone in senior apartment. Has supportive daughter and son-in-law that check in daily.   Self-Care   Usual Activity Tolerance good   Current Activity Tolerance moderate   Equipment Currently Used at Home cane, straight;shower chair   Fall history within last six months yes   Number of times patient has fallen within last six months 1   Activity/Exercise/Self-Care Comment Patient reports independent with mobility at baseline, reports owning a cane.   General Information   Onset of Illness/Injury or Date of Surgery 04/07/24   Referring Physician Amita Edwards PA-C   Patient/Family Therapy Goals Statement (PT) to return home   Pertinent History of Current Problem (include personal factors and/or comorbidities that impact the POC) Trevor Larios is a 73 year old male with history of seizure disorder, Crohn's disease, tobacco use disorder, aortic aneurysm, malnutrition, hypotension, chronic anemia, and mild to moderate cognitive impairment who was admitted with recurrent seizures, acute hypoxic respiratory failure, and possible pneumonia.   Existing Precautions/Restrictions fall;seizures   Cognition   Affect/Mental Status (Cognition) WFL   Orientation Status (Cognition) oriented x 4   Follows Commands (Cognition) WFL   Pain Assessment   Patient Currently in Pain No   Integumentary/Edema   Integumentary/Edema no deficits were identifed   Posture    Posture Protracted shoulders   Range of Motion (ROM)   ROM Comment BLE WFL   Strength (Manual Muscle Testing)   Strength Comments generalized decondiitoning, BLE WFL with mobility   Bed Mobility   Comment,  (Bed Mobility) supine>sit SBA   Transfers   Comment, (Transfers) sit<>stand SBA   Gait/Stairs (Locomotion)   Comment, (Gait/Stairs) ambulates with CGA-SBA, decreased gait speed, antalgic gait (reports L leg pain from recent fall)   Balance   Balance Comments good sitting balance, mild dynamic balance impairments   Sensory Examination   Sensory Perception patient reports no sensory changes   Clinical Impression   Criteria for Skilled Therapeutic Intervention Yes, treatment indicated   PT Diagnosis (PT) impaired functional mobility   Influenced by the following impairments strength, balance, activity tolerance   Functional limitations due to impairments transfers, gait, functional endurance   Clinical Presentation (PT Evaluation Complexity) stable   Clinical Presentation Rationale clinical judgement   Clinical Decision Making (Complexity) low complexity   Planned Therapy Interventions (PT) balance training;gait training;patient/family education;strengthening;transfer training;progressive activity/exercise;risk factor education;home program guidelines   Risk & Benefits of therapy have been explained evaluation/treatment results reviewed;care plan/treatment goals reviewed;risks/benefits reviewed;participants voiced agreement with care plan;participants included;patient   PT Discharge Planning   PT Discharge Recommendation (DC Rec) home with assist;home with home care physical therapy   PT Rationale for DC Rec Patient mobilizing fairly well, demonstrates mild balance impairments compared to baseline which patient reports due to L leg pain from fall, likely to benefit from FWW- will issue at discharge Anticipate patient will be safe to discharge home with PRN assist from family when medically ready. Will benefit from  PT to increase strength, balance and functioanal independence.   PT Brief overview of current status Ambulates with CGA-SBA, on RA at rest but requiring 2L O2 during amblation to maintain SpO2>90%.

## 2024-04-10 NOTE — PROGRESS NOTES
"Care Management Follow Up    Length of Stay (days): 3    Expected Discharge Date: 04/12/2024     Concerns to be Addressed:    Community resource needs   Patient plan of care discussed at interdisciplinary rounds: Yes    Anticipated Discharge Disposition:  Physical Therapy is recommending return to home     Anticipated Discharge Services:   The  RNCC will determine whether or not pt needs skilled home care services and if so, would arrange this. This SW will provide community resource information  Anticipated Discharge DME:  Not applicable at this time    Patient/family educated on Medicare website which has current facility and service quality ratings:  Not applicable at this time  Education Provided on the Discharge Plan:  Not applicable at this time  Patient/Family in Agreement with the Plan:  Yes    Referrals Placed by CM/SW:  None  Private pay costs discussed: Not applicable at this time    Additional Information:  This coverage SW reviewed 4/9/2024 progress note entry written by BONNY Pimentel which states:  \"He currently doesn't have a PCP in [place] yet. Rylee expressed being extremely overwhelmed and would like help in getting pt settled with as much resources as she can, which includes the following (County Worker;Housekeeping/Chores Agency;Meals on Wheels;Mental Health Resources;PCA;Transportation Services;Home Care;LTC;Medicare part B). Rylee also reports she is POA for her dad.      Rylee and Luis Felipe will be coming into the hospital tomorrow (10 am -12 pm) and would like as many resources as possible. Rylee reports she finished the LTC application, but would like help in getting it faxed. Rylee also would like a blank HCD form. \"    BONNY met with pt's daughter (Rylee) and addressed the following:  - Primary Care Physician, Rylee states that pt has a primary care physician, Dr. Shea who offices at [facility] ([phone])  - Health Care Directive, SW provided health care directive form " "and informed pt's daughter that SW can arrange for pt's signature to be notarized on the health care directive if the directive is completed during pt's hospital stay  - Medicare Part B, BONNY reviewed a copy of pt's medicare card which is present in the \"Media Tab\" of Epic.  Pt is already enrolled in medicare parts A and B.  BONNY discussed this further with Rylee who states that she would like to get pt on Medicare Part D and is already independently pursuing this by seeking out a medicare advantage plan for pt, Rylee states that she does not need assistance with this matter  - Mental health resources, Rylee states that she was misunderstood and clarify's that pt is not in need of mental health services  - Medical Assistance for Long Term Care Services, Rylee presented a completed application and this SW faxed  ([phone]) the completed jourdan to [facility] [employer] [facility], BONNY advised Rylee to call (number provided) [facility] on Monday to ensure that the fax was received and the jourdan is being processed  - Rylee indicates that the address indicated on pt's [facility] Facesheet is incorrect.  Rylee states that pt's correct address is [address] [place] [address], [address] [place] [address], SW phoned [facility] Registration/Admissions and provided the correct information to be updated in [facility]  - [facility] , meal delivery, chore services, PCA, SW advised that Rylee phone [facility] to schedule a [place] Choices assessment to determine whether or not pt would be eligible for services through the [facility] which could include all of these services if pt is determined to be eligible.  SW provided Rylee with the phone number to call [facility] to schedule the assessment as well as written information about  [place] Choices  - Transportation, SW informed Rylee that pt will be eligible for transportation to/from non emergent medical assistance if he becomes active on MA. It pt becomes active on MA but is not enrolled in a " health plan, pt would be eligible to use MNET.  It pt becomes active on MA and has a health plan managing the benefits, pt would be able to use the health plans transportation program.  BONNY provided Rylee with a list of all of the phone numbers (for MNET and for health plan transportation programs  - skilled home care, BONNY informed Rylee that the 6B RN CC will determine whether or not pt qualify's for skilled home care service and if so, would arrange for the service, Rylee states that she is doubtful that pt will qualify for skilled home care.    Pt's daughter voiced appreciation for the above assistance. SW available should add'l needs arise.    RAI Marley, covering for 6B SW on 4/10/2024 only  Social Work, 6A  Phone:  [phone]  Pager:  [phone]  4/10/2024              LALY AdamW
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
## 2024-04-10 NOTE — PLAN OF CARE
Goal Outcome Evaluation:               Neuro: LOCx4, neruos intact. Forgetful at times. On seizure precautions- scheduled keppra   Cardiac: no tele orders. BP stable today  Respiratory: sating >90 on RA. De sated in the mid 80s with ambulation  Diet: regular Diet,  needs soft foods d/t missing teeth  GI/:  voids via urinal. 1 large BM today  Activity:  Assist x1  Pain:  denies   Skin:  WNL.  Lines: 2 L PIVs SL          possible discharge tomorrow with home care. IV antibiotics switched to oral

## 2024-04-10 NOTE — PROGRESS NOTES
Care Management Follow Up    Length of Stay (days): 3    Expected Discharge Date: 04/12/2024     Concerns to be Addressed: Discharge planning, medical readiness.   Patient plan of care discussed at interdisciplinary rounds: Yes    Anticipated Discharge Disposition: Home  Anticipated Discharge Services: Home care  Anticipated Discharge DME: Walker    Patient/family educated on Medicare website which has current facility and service quality ratings:  NA  Education Provided on the Discharge Plan:  Yes  Patient/Family in Agreement with the Plan:  Yes    Referrals Placed by CM/SW:  Home care, DME.  Private pay costs discussed: Not applicable    Additional Information:  Per chart review, PT and OT are recommending patient discharge back to his prior living arrangement w/home health PT and OT. Met w/patient, he is agreeable to HC. Writer contacted pt's daughter, Rylee. Rylee confirmed that the patient has a PCP, Lyly Crouch w/[facility], this has been updated on the pt's face sheet. Pt's daughter is appreciative of HC set up. HC referral initiated to the [facility] for RN, PT and OT. Discussed pt w/therapy, they will issue a walker prior to discharge. No additional needs noted at this time.     Discharge resources:  Home Care (RN, PT, OT)  Agency TBD    Care coordination will continue to follow.     Estelita Jang, ENRICOCC, BSN    [facility]    [address]  [address] [place] [id number]    [email or web address]    Office: [phone] Pager: [phone]

## 2024-04-10 NOTE — PLAN OF CARE
[id number]  Vitals:  Blood pressure 97/57, pulse 58, temperature 98.4  F (36.9  C), temperature source Oral, resp. rate 20, weight 64.5 kg (142 lb 3.2 oz), SpO2 97%.  Neuro: A/Ox4, q4 neuro checks consistent. On seizure precautions.  Cardiac: SR w/ PVC's. Bp stable overnight  Respiratory: sating >92 on 2L NC  Diet/appetite: regular Diet, tolerating well  GI/:  BM x1, voids ad dimple in urinal/bed pan  Activity:  assist x1, pos changed indep  Pain: Denies  Skin: WNL  Lines: R piv sl, L piv tko    Plan: cont abx/neuro checks/seizure precautions, cont to monitor pt condition and notify team of change per POC.    Problem: Adult Inpatient Plan of Care  Goal: Absence of Hospital-Acquired Illness or Injury  Outcome: Progressing  Intervention: Identify and Manage Fall Risk  Recent Flowsheet Documentation  Taken 4/9/2024 1929 by Lesly Panchal RN  Safety Promotion/Fall Prevention:   activity supervised   clutter free environment maintained   patient and family education   room near nurse's station   safety round/check completed  Intervention: Prevent Skin Injury  Recent Flowsheet Documentation  Taken 4/9/2024 1929 by Lesly Panchal, RN  Body Position: position changed independently  Skin Protection: adhesive use limited  Device Skin Pressure Protection:   absorbent pad utilized/changed   adhesive use limited   tubing/devices free from skin contact  Intervention: Prevent and Manage VTE (Venous Thromboembolism) Risk  Recent Flowsheet Documentation  Taken 4/9/2024 1929 by Lesly Panchal, RN  VTE Prevention/Management: SCDs (sequential compression devices) off  Intervention: Prevent Infection  Recent Flowsheet Documentation  Taken 4/9/2024 1929 by Lesly Panchal RN  Infection Prevention:   cohorting utilized   environmental surveillance performed   equipment surfaces disinfected   hand hygiene promoted   personal protective equipment utilized   rest/sleep promoted   single patient room provided     Problem: Pain Acute  Goal: Optimal  Pain Control and Function  Outcome: Progressing  Intervention: Prevent or Manage Pain  Recent Flowsheet Documentation  Taken 4/9/2024 1929 by Lesly Panchal, RN  Sensory Stimulation Regulation:   care clustered   quiet environment promoted  Sleep/Rest Enhancement:   awakenings minimized   consistent schedule promoted  Bowel Elimination Promotion:   adequate fluid intake promoted   commode/bedpan at bedside  Medication Review/Management: medications reviewed  Intervention: Optimize Psychosocial Wellbeing  Recent Flowsheet Documentation  Taken 4/9/2024 1929 by Lesly Panchal, RN  Supportive Measures:   active listening utilized   decision-making supported   problem-solving facilitated   self-care encouraged     Problem: Comorbidity Management  Goal: Blood Pressure in Desired Range  Outcome: Progressing  Intervention: Maintain Blood Pressure Management  Recent Flowsheet Documentation  Taken 4/9/2024 1929 by Lesly Panchal, RN  Medication Review/Management: medications reviewed

## 2024-04-10 NOTE — PROGRESS NOTES
[facility]  Neurology Progress Note    Patient Name:  Trevor Larios  MRN:  [id number]    :  1950  Date of Admission:  2024  Date of Service:  04/10/2024  Hospital Day: 4     Interval History/24-hour Events   Interval Events:  -No acute events overnight.  -No potential seizures or seizure-like events per patient.    Today's Changes:  -Keppra AM trough level = 14.6     Assessment & Plan    Trevor Larios is a right-handed 73 year old male with PMHx of seizure disorder previously on Depakote and Zonegram, cognitive impairment, and Crohn's disease who arrived to the ED from home on 2024 due to altered mental status, possible fall, and possible seizure in the setting of medication non-compliance and poor continuity of care. He recently moved to [place] a few weeks ago from [place] and lives alone. Neurological examination with intact orientation to person, place, and situation without any focal motor, sensory, or coordination deficits. PTA anti-seizure medications (Depakote and Zonegram) have been discontinued due to concerns for worsening cognitive impairment and Keppra has been initiated. MRI brain on 24 showed moderate generalized volume loss and mild chronic small vessel ischemic disease but no seizure nidus. EEG discontinued on 24 after 2 days negative for seizures.    Recommendations:  -Discontinue PTA Depakote and Zonegram  -Continue Keppra 750/1000mg BID with enough supply until his outpatient follow-up  -Neurology referral for outpatient follow-up [Ordered by Neurology]  -OK to discharge from Neurology standpoint    Neurology will sign off.    Thank you for allowing the neurology service to participate in the care of Trevor Larios.  Please contact us with questions.      This patient was discussed with the neurology attending faculty, Dr. Elvi Arechiga.    William Lim DO  Neurology Resident PGY3  04/10/2024  Neurology Pager:  "[phone]       Physical Exam   Temp:  [97.9  F (36.6  C)-98.7  F (37.1  C)] 98.1  F (36.7  C)  Pulse:  [58-70] 63  Resp:  [20-24] 20  BP: ()/(49-99) 103/61  SpO2:  [93 %-99 %] 93 %    I/O last 3 completed shifts:  In: 920 [P.O.:920]  Out: 1025 [Urine:1025]     Neurologic  Mental Status:  alert, oriented x 3, follows commands, speech clear and fluent, naming and repetition normal  Cranial Nerves:  visual fields intact, EOMI with normal smooth pursuit, facial sensation intact and symmetric, facial movements symmetric, hearing not formally tested but intact to conversation, no dysarthria, shoulder shrug strong bilaterally, tongue protrusion midline  Motor:  normal muscle tone and bulk, no abnormal movements, able to move all limbs spontaneously, strength 5/5 throughout upper and lower extremities  Reflexes:  2+ and symmetric throughout, no clonus, diminished achilles tendon reflex, negative Armstrong's sign  Sensory:  light touch sensation intact and symmetric throughout upper and lower extremities  Coordination:  normal finger-to-nose and heel-to-shin bilaterally without dysmetria  Station/Gait:  deferred     Investigations   Labs  BMP  Recent Labs   Lab 04/10/24  0549 04/09/24  0522 04/08/24  0904 04/07/24  1704    143 141 137   POTASSIUM 4.0 3.6 4.0 4.1   CHLORIDE 110* 110* 105 107   CO2 23 24 24 21*   BUN 19.3 26.6* 23.6* 19.5   CR 0.64* 0.73 0.77 0.68   MALIHA 8.0* 8.4* 8.5* 8.4*       Liver Panel  Recent Labs   Lab 04/10/24  0549 04/09/24  0522 04/08/24  0904 04/07/24  1704   PROTTOTAL 6.2* 6.6 6.8 6.7   ALBUMIN 3.1* 3.2* 3.5 3.4*   BILITOTAL 0.5 0.6 0.9 0.5   ALKPHOS 50 54 60 64   AST 25 32 39 32   ALT 18 16 18 17       CBC  Recent Labs   Lab 04/10/24  0549 04/09/24  0522 04/08/24  0904 04/07/24  1704   WBC 7.4 8.0 10.2 14.3*   HGB 7.9* 9.2* 9.4* 9.1*    253 248 266       COAGS  Recent Labs   Lab 04/08/24  0904   INR 1.24*       ABG  No results for input(s): \"PH\", \"PCO2\", \"PO2\", \"HCO3\" in the " "last 168 hours.    CRP/ESR  No results for input(s): \"CRP\" in the last 168 hours.    Invalid input(s): \"ESR\"    CSF  No results for input(s): \"CGLU\", \"CTP\" in the last 168 hours.    Invalid input(s): \"CCSF\"    MICRO  No results for input(s): \"CULT\" in the last 168 hours.    LIPIDS  No results for input(s): \"CHOL\", \"HDL\", \"LDL\", \"TRIG\", \"CHOLHDLRATIO\" in the last 50135 hours.    A1C    No lab results found.    Imaging  I have personally reviewed most recent and pertinent labs, tests, and radiological images; relevant findings per HPI.    MR BRAIN W/O CONTRAST 2024 11:57 AM  Impression:   1. No definite seizure nidus.  2. Moderate generalized volume loss and mild chronic small vessel  ischemic disease.  3. Likely acute right maxillary sinusitis.    EEG Video 2-12 HRS Ummonitored Result  VIDEO EEG DATE: 2024  VIDEO EEG [id number]  VIDEO EEG DAY#: 2  VIDEO EEG SOURCE FILE DURATION: 10 hours 8 minutes  SUMMARY OF THE 2 DAYS OF VEEG: The 2 days of VEEG is abnormal due to the presence of intermittent theta slowing consistent with a borderline slowing. No electrographic seizures or epileptiform discharges were recorded. At times EKG shows PVCs and abnormal rhythm. Clinical correlation is advised.      "

## 2024-04-11 ENCOUNTER — APPOINTMENT (OUTPATIENT)
Dept: PHYSICAL THERAPY | Facility: CLINIC | Age: 74
DRG: 871 | End: 2024-04-11
Payer: MEDICARE

## 2024-04-11 VITALS
BODY MASS INDEX: 18.77 KG/M2 | HEART RATE: 83 BPM | TEMPERATURE: 97.9 F | DIASTOLIC BLOOD PRESSURE: 67 MMHG | WEIGHT: 128.97 LBS | SYSTOLIC BLOOD PRESSURE: 113 MMHG | OXYGEN SATURATION: 93 % | RESPIRATION RATE: 16 BRPM

## 2024-04-11 PROBLEM — G40.909 SEIZURE DISORDER (H): Status: ACTIVE | Noted: 2024-04-11

## 2024-04-11 PROBLEM — J18.9 PNEUMONIA: Status: ACTIVE | Noted: 2024-04-11

## 2024-04-11 LAB
ALBUMIN SERPL BCG-MCNC: 3.2 G/DL (ref 3.5–5.2)
ALP SERPL-CCNC: 55 U/L (ref 40–150)
ALT SERPL W P-5'-P-CCNC: 16 U/L (ref 0–70)
ANION GAP SERPL CALCULATED.3IONS-SCNC: 10 MMOL/L (ref 7–15)
AST SERPL W P-5'-P-CCNC: 20 U/L (ref 0–45)
BILIRUB SERPL-MCNC: 0.4 MG/DL
BUN SERPL-MCNC: 20.6 MG/DL (ref 8–23)
CALCIUM SERPL-MCNC: 8.7 MG/DL (ref 8.8–10.2)
CHLORIDE SERPL-SCNC: 106 MMOL/L (ref 98–107)
CREAT SERPL-MCNC: 0.58 MG/DL (ref 0.67–1.17)
DEPRECATED HCO3 PLAS-SCNC: 23 MMOL/L (ref 22–29)
EGFRCR SERPLBLD CKD-EPI 2021: >90 ML/MIN/1.73M2
ERYTHROCYTE [DISTWIDTH] IN BLOOD BY AUTOMATED COUNT: 15.6 % (ref 10–15)
GLUCOSE SERPL-MCNC: 94 MG/DL (ref 70–99)
HCT VFR BLD AUTO: 29.6 % (ref 40–53)
HGB BLD-MCNC: 9.2 G/DL (ref 13.3–17.7)
MCH RBC QN AUTO: 33.1 PG (ref 26.5–33)
MCHC RBC AUTO-ENTMCNC: 31.1 G/DL (ref 31.5–36.5)
MCV RBC AUTO: 107 FL (ref 78–100)
PLATELET # BLD AUTO: 319 10E3/UL (ref 150–450)
POTASSIUM SERPL-SCNC: 4 MMOL/L (ref 3.4–5.3)
PROT SERPL-MCNC: 6.5 G/DL (ref 6.4–8.3)
RBC # BLD AUTO: 2.78 10E6/UL (ref 4.4–5.9)
SODIUM SERPL-SCNC: 139 MMOL/L (ref 135–145)
WBC # BLD AUTO: 6.3 10E3/UL (ref 4–11)

## 2024-04-11 PROCEDURE — 250N000013 HC RX MED GY IP 250 OP 250 PS 637: Performed by: PHYSICIAN ASSISTANT

## 2024-04-11 PROCEDURE — 97116 GAIT TRAINING THERAPY: CPT | Mod: GP

## 2024-04-11 PROCEDURE — 250N000013 HC RX MED GY IP 250 OP 250 PS 637: Performed by: STUDENT IN AN ORGANIZED HEALTH CARE EDUCATION/TRAINING PROGRAM

## 2024-04-11 PROCEDURE — 250N000011 HC RX IP 250 OP 636: Performed by: PHYSICIAN ASSISTANT

## 2024-04-11 PROCEDURE — 250N000013 HC RX MED GY IP 250 OP 250 PS 637: Performed by: INTERNAL MEDICINE

## 2024-04-11 PROCEDURE — 99239 HOSP IP/OBS DSCHRG MGMT >30: CPT | Performed by: INTERNAL MEDICINE

## 2024-04-11 PROCEDURE — 97530 THERAPEUTIC ACTIVITIES: CPT | Mod: GP

## 2024-04-11 PROCEDURE — 80053 COMPREHEN METABOLIC PANEL: CPT | Performed by: PHYSICIAN ASSISTANT

## 2024-04-11 PROCEDURE — 85027 COMPLETE CBC AUTOMATED: CPT | Performed by: PHYSICIAN ASSISTANT

## 2024-04-11 PROCEDURE — 36415 COLL VENOUS BLD VENIPUNCTURE: CPT | Performed by: PHYSICIAN ASSISTANT

## 2024-04-11 RX ORDER — FERROUS SULFATE 325(65) MG
325 TABLET ORAL DAILY
Qty: 30 TABLET | Refills: 0 | Status: SHIPPED | OUTPATIENT
Start: 2024-04-11

## 2024-04-11 RX ORDER — LEVETIRACETAM 1000 MG/1
1000 TABLET ORAL EVERY EVENING
Qty: 30 TABLET | Refills: 1 | Status: SHIPPED | OUTPATIENT
Start: 2024-04-11 | End: 2024-06-11

## 2024-04-11 RX ORDER — ASPIRIN 81 MG/1
81 TABLET, CHEWABLE ORAL EVERY EVENING
Qty: 90 TABLET | Refills: 0 | Status: SHIPPED | OUTPATIENT
Start: 2024-04-11

## 2024-04-11 RX ORDER — MULTIPLE VITAMINS W/ MINERALS TAB 9MG-400MCG
1 TAB ORAL DAILY
Qty: 30 TABLET | Refills: 0 | Status: SHIPPED | OUTPATIENT
Start: 2024-04-11

## 2024-04-11 RX ORDER — MIDODRINE HYDROCHLORIDE 5 MG/1
10 TABLET ORAL 3 TIMES DAILY
Qty: 270 TABLET | Refills: 11 | Status: SHIPPED | OUTPATIENT
Start: 2024-04-11 | End: 2024-06-05

## 2024-04-11 RX ORDER — LEVETIRACETAM 750 MG/1
750 TABLET ORAL EVERY MORNING
Qty: 30 TABLET | Refills: 1 | Status: SHIPPED | OUTPATIENT
Start: 2024-04-11 | End: 2024-07-01

## 2024-04-11 RX ADMIN — FERROUS SULFATE TAB 325 MG (65 MG ELEMENTAL FE) 325 MG: 325 (65 FE) TAB at 08:36

## 2024-04-11 RX ADMIN — LEVETIRACETAM 750 MG: 750 TABLET, FILM COATED ORAL at 08:37

## 2024-04-11 RX ADMIN — Medication 1000 MCG: at 08:37

## 2024-04-11 RX ADMIN — Medication 1 TABLET: at 08:36

## 2024-04-11 RX ADMIN — GUAIFENESIN 600 MG: 600 TABLET ORAL at 08:37

## 2024-04-11 RX ADMIN — MIDODRINE HYDROCHLORIDE 10 MG: 5 TABLET ORAL at 08:36

## 2024-04-11 RX ADMIN — AMOXICILLIN AND CLAVULANATE POTASSIUM 1 TABLET: 875; 125 TABLET, FILM COATED ORAL at 08:37

## 2024-04-11 RX ADMIN — THIAMINE HCL TAB 100 MG 100 MG: 100 TAB at 08:36

## 2024-04-11 RX ADMIN — ENOXAPARIN SODIUM 30 MG: 30 INJECTION SUBCUTANEOUS at 00:38

## 2024-04-11 ASSESSMENT — ACTIVITIES OF DAILY LIVING (ADL)
ADLS_ACUITY_SCORE: 47
ADLS_ACUITY_SCORE: 38
ADLS_ACUITY_SCORE: 47
ADLS_ACUITY_SCORE: 38
ADLS_ACUITY_SCORE: 47
WEAR_GLASSES_OR_BLIND: NO

## 2024-04-11 NOTE — PLAN OF CARE
Physical Therapy Discharge Summary    Reason for therapy discharge:    Discharged to home with home therapy.    Progress towards therapy goal(s). See goals on Care Plan in [facility] electronic health record for goal details.  Goals met/adequate for d/c    Therapy recommendation(s):    Continued therapy is recommended.  Rationale/Recommendations:  HH therapies to maximize safety and independence with functional mobility.

## 2024-04-11 NOTE — PROGRESS NOTES
Care Management Follow Up    Length of Stay (days): 4    Expected Discharge Date: 4/11     Concerns to be Addressed: Discharge planning.   Patient plan of care discussed at interdisciplinary rounds: Yes    Anticipated Discharge Disposition: Home  Anticipated Discharge Services: Home care  Anticipated Discharge DME:  Walker    Patient/family educated on Medicare website which has current facility and service quality ratings:  Yes  Education Provided on the Discharge Plan:  Yes  Patient/Family in Agreement with the Plan:  Yes    Referrals Placed by CM/SW:  Home care, DME.  Private pay costs discussed: Not applicable    Additional Information:  Per the primary team, patient is medically ready for discharge[facility], home care order placed at this time. PT will issue a walker prior to discharge. Pt's daughter has been updated on discharge plan and home care via . Home care was instructed to contact pt's daughter to schedule visits. No additional discharge needs noted.     1110 Addendum:  Writer spoke w/pt's daughter, updated her on home care. Pt's daughter voiced concern regarding the cost of pt's medications at discharge, noting that the patients Medicare Part D coverage is not effective until 5/1. Writer clarified that patient's discharge meds can be added to a discharge bill and they would not need to pay for pt's meds out of pocket today, pt's daughter stated that she was told that they would need to pay for the meds today and that she feels the system is broken. Writer attempted to clarify again that they would not need to pay out of pocket today for any meds, pt's daughter was not receptive to this. Writer confirmed w/discharge pharmacy, they had attempted to explain the process as well, but pt's daughter was not seemingly receptive to them either. Ultimately, it is confirmed the patient will not have to pay out of pocket upon discharge for his medications, this has been  communicated by the RNCC and discharge pharmacist.    Discharge resources:    [facility]  Phone: [phone]  Fax: [phone]    Care coordination will continue to follow.     Estelita Jang, RNCC, BSN    [facility]  [address] [place] [id number]    [email or web address]    Office: [phone] Pager: [phone]
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
## 2024-04-11 NOTE — PLAN OF CARE
Occupational Therapy Discharge Summary    Reason for therapy discharge:    Discharged to home with home therapy.    Progress towards therapy goal(s). See goals on Care Plan in [facility] electronic health record for goal details.  Goals partially met.  Barriers to achieving goals:   discharge from facility.    Therapy recommendation(s):    Continued therapy is recommended.  Rationale/Recommendations:  Pt mobilizing with CGA this date, anticipate near functional baseline. pt would benefit from HH therapies at d/c to cont progressing IND/safety. This Th would also recommend A for heavy tasks as pt improves IND - med management, laundry, cleaning etc..

## 2024-04-11 NOTE — PLAN OF CARE
DISCHARGE                         No discharge date for patient encounter.  ----------------------------------------------------------------------------  Discharged to: Home  Via: private transportation  Accompanied by: Family  Discharge Instructions: Regular diet, activity as tolerated, medications, follow up appointments, when to call the MD, aftercare instructions.  Prescriptions: To be filled by [facility]; medication list reviewed & sent with pt  Follow Up Appointments: arranged; information given  Belongings: All sent with pt  IV: d/c'd  Telemetry: d/c'd  Pt exhibits understanding of above discharge instructions; all questions answered.    Discharge Paperwork: Signed, copied, and sent home with patient.      Hours of Care: [id number]    Neuro: A&Ox4. Flat affect.  Cardiac: No tele orders. HR irregular. VSS.   Respiratory: Sating >95% on RA.  GI/: Adequate urine output. Diarrhea, watery BM X1  Diet/appetite: Tolerating regular diet. Eating well.  Activity: Assist of 1 with GB/walker, up to chair and in halls.  Pain: At acceptable level on current regimen.   Skin: No new deficits noted.  LDA's: R, L LINA SL.    Plan: Continue with POC. Notify primary team with changes.

## 2024-04-11 NOTE — PLAN OF CARE
Neuro: A&Ox4. Neuros intact. Seizure precautions maintained.  Cardiac: No tele. VSS.   Respiratory: Sating WDL on RA. Did not require O2 while ambulating to the bathroom.  GI/: Watery/loose BM x3, held stool softeners.   Diet/appetite: Tolerating regular diet.   Activity:  Assist of 1 w/ FWW & GB. Up to bathroom  Pain: At acceptable level on current regimen.   Skin: No new deficits noted.  LDA's: 2 L PIV SL    Plan: Continue with POC. Notify primary team with changes.

## 2024-04-12 ENCOUNTER — TELEPHONE (OUTPATIENT)
Dept: VASCULAR SURGERY | Facility: CLINIC | Age: 74
End: 2024-04-12
Payer: MEDICARE

## 2024-04-12 LAB
BACTERIA BLD CULT: NO GROWTH
BACTERIA BLD CULT: NO GROWTH

## 2024-04-12 NOTE — TELEPHONE ENCOUNTER
Confirmed scheduled appointment:  Date: 4/17/24  Time: 4 PM  Visit type: NVASCP  Provider: Celestine  Location: [facility]  Testing/imaging: CTA  Additional notes: per RNCC.

## 2024-04-15 ENCOUNTER — MEDICAL CORRESPONDENCE (OUTPATIENT)
Dept: HEALTH INFORMATION MANAGEMENT | Facility: CLINIC | Age: 74
End: 2024-04-15

## 2024-04-15 ENCOUNTER — PATIENT OUTREACH (OUTPATIENT)
Dept: CARE COORDINATION | Facility: CLINIC | Age: 74
End: 2024-04-15

## 2024-04-15 ENCOUNTER — TELEPHONE (OUTPATIENT)
Dept: FAMILY MEDICINE | Facility: CLINIC | Age: 74
End: 2024-04-15

## 2024-04-15 NOTE — PROGRESS NOTES
Clinic Care Coordination Contact  [facility]: Post-Discharge Note  SITUATION                                                      Admission:    Admission Date: 04/07/24   Reason for Admission: recurrent seizures, acute hypoxic respiratory failure, and pneumonia.  Discharge:   Discharge Date: 04/11/24  Discharge Diagnosis: # Seizure disorder with breakthrough seizure   # Acute hypoxic respiratory failure  # Suspected pneumonia  # Possible ILD vs alveolar proteinosis  # Sepsis due to community acquired pneumonia vs aspiration pneumonia   # Elevated BNP   # Demand Ischemia  # Hypotension  # Thoracoabdominal Aneurysm  # Chronic macrocytic anemia   # Hx Iron deficiency  # Severe Malnutrition  # Mild to Moderate Cognitive Impairment  # Crohn's Disease    BACKGROUND                                                      Per hospital discharge summary and inpatient provider notes:  73 year old male with history of seizure disorder, Crohn's disease, tobacco use disorder, aortic aneurysm, malnutrition, hypotension, chronic anemia, and mild to moderate cognitive impairment who was admitted with recurrent seizures, acute hypoxic respiratory failure, and pneumonia.     ASSESSMENT           Discharge Assessment  How are you doing now that you are home?: He is doing better.  How are your symptoms? (Red Flag symptoms escalate to triage hotline per guidelines): Improved  Do you feel your condition is stable enough to be safe at home until your provider visit?: Yes  Does the patient have their discharge instructions? : Yes  Does the patient have questions regarding their discharge instructions? : No  Were you started on any new medications or were there changes to any of your previous medications? : Yes  Does the patient have all of their medications?: Yes  Do you have questions regarding any of your medications? : No  Do you have all of your needed medical supplies or equipment (DME)?  (i.e. oxygen tank, CPAP, cane, etc.):  Yes  Discharge follow-up appointment scheduled within 14 calendar days? : Yes  Discharge Follow Up Appointment Date: 04/17/24  Discharge Follow Up Appointment Scheduled with?: Primary Care Provider         Post-op (Clinicians Only)  Did the patient have surgery or a procedure: No  Fever: No  Chills: No  Eating & Drinking: eating and drinking without complaints/concerns  PO Intake: regular diet  Bowel Function: normal  Urinary Status: voiding without complaint/concerns    Care Management       Care Mgmt General Assessment      CCC RN spoke with patient's daughter Rylee today. She reported she is also his POA. Rylee stated patient was feeling better. She stated he is taking all of his medications as directed. She reported the home care RN will be coming out today. She stated patient will also be receiving home care PT. Patient has a follow up with PCP on 4/17/24 and plans to attend this appointment with her daughter. Daughter expressed transportation concern for future appointments. Writer will provide her with application for [employer] per her request. She denied any other needs or concerns at this time.                    PLAN                                                      Outpatient Plan:  Home with home care    Future Appointments   Date Time Provider Department Center   4/17/2024  8:00 AM Lyly Crouch MD [facility]   4/24/2024  3:20 PM [id number]   4/29/2024 10:00 AM Shine Sprague MD [facility]   5/8/2024  9:30 AM Nicanor Fowler PT [facility]   5/15/2024  9:30 AM Nicanor Fowler PT [facility]         For any urgent concerns, please contact our 24 hour nurse triage line: [phone] ([phone])         David J. Myhre, RN

## 2024-04-15 NOTE — TELEPHONE ENCOUNTER
Home Health Care    Reason for call:  Verbal Orders     Orders are needed for this patient.  Skilled Nursinx a week for 4 weeks and then 1x every other week for 4 weeks. OT and PT eval and treat and a medical social worker.     Pt Provider: Willa    Phone Number Homecare Nurse can be reached at: [phone]       Okay to leave a detailed message?: Yes at Other phone number:  [phone]

## 2024-04-17 ENCOUNTER — ANCILLARY PROCEDURE (OUTPATIENT)
Dept: GENERAL RADIOLOGY | Facility: CLINIC | Age: 74
End: 2024-04-17
Payer: MEDICARE

## 2024-04-17 ENCOUNTER — OFFICE VISIT (OUTPATIENT)
Dept: FAMILY MEDICINE | Facility: CLINIC | Age: 74
End: 2024-04-17
Payer: MEDICARE

## 2024-04-17 VITALS
OXYGEN SATURATION: 94 % | HEIGHT: 69 IN | HEART RATE: 89 BPM | BODY MASS INDEX: 19.4 KG/M2 | RESPIRATION RATE: 19 BRPM | WEIGHT: 131 LBS | TEMPERATURE: 101.5 F

## 2024-04-17 DIAGNOSIS — Z11.59 NEED FOR HEPATITIS C SCREENING TEST: ICD-10-CM

## 2024-04-17 DIAGNOSIS — R50.9 FEVER, UNSPECIFIED FEVER CAUSE: Primary | ICD-10-CM

## 2024-04-17 DIAGNOSIS — K50.918 CROHN'S DISEASE WITH OTHER COMPLICATION, UNSPECIFIED GASTROINTESTINAL TRACT LOCATION (H): ICD-10-CM

## 2024-04-17 DIAGNOSIS — R63.6 UNDERWEIGHT: ICD-10-CM

## 2024-04-17 DIAGNOSIS — Z99.89 USE OF CANE AS AMBULATORY AID: ICD-10-CM

## 2024-04-17 DIAGNOSIS — R19.7 DIARRHEA, UNSPECIFIED TYPE: ICD-10-CM

## 2024-04-17 DIAGNOSIS — I25.10 CORONARY ARTERY DISEASE INVOLVING NATIVE HEART WITHOUT ANGINA PECTORIS, UNSPECIFIED VESSEL OR LESION TYPE: ICD-10-CM

## 2024-04-17 DIAGNOSIS — R50.9 FEVER, UNSPECIFIED FEVER CAUSE: ICD-10-CM

## 2024-04-17 DIAGNOSIS — G40.909 SEIZURE DISORDER (H): ICD-10-CM

## 2024-04-17 LAB
ALBUMIN UR-MCNC: 30 MG/DL
APPEARANCE UR: CLEAR
BACTERIA #/AREA URNS HPF: ABNORMAL /HPF
BASOPHILS # BLD AUTO: 0.1 10E3/UL (ref 0–0.2)
BASOPHILS NFR BLD AUTO: 0 %
BILIRUB UR QL STRIP: NEGATIVE
C PNEUM DNA SPEC QL NAA+PROBE: NOT DETECTED
COLOR UR AUTO: YELLOW
EOSINOPHIL # BLD AUTO: 0 10E3/UL (ref 0–0.7)
EOSINOPHIL NFR BLD AUTO: 0 %
ERYTHROCYTE [DISTWIDTH] IN BLOOD BY AUTOMATED COUNT: 15.4 % (ref 10–15)
FLUAV H1 2009 PAND RNA SPEC QL NAA+PROBE: NOT DETECTED
FLUAV H1 RNA SPEC QL NAA+PROBE: NOT DETECTED
FLUAV H3 RNA SPEC QL NAA+PROBE: NOT DETECTED
FLUAV RNA SPEC QL NAA+PROBE: NOT DETECTED
FLUBV RNA SPEC QL NAA+PROBE: NOT DETECTED
GLUCOSE UR STRIP-MCNC: NEGATIVE MG/DL
HADV DNA SPEC QL NAA+PROBE: NOT DETECTED
HCOV PNL SPEC NAA+PROBE: NOT DETECTED
HCT VFR BLD AUTO: 31.4 % (ref 40–53)
HGB BLD-MCNC: 9.8 G/DL (ref 13.3–17.7)
HGB UR QL STRIP: ABNORMAL
HMPV RNA SPEC QL NAA+PROBE: NOT DETECTED
HPIV1 RNA SPEC QL NAA+PROBE: NOT DETECTED
HPIV2 RNA SPEC QL NAA+PROBE: NOT DETECTED
HPIV3 RNA SPEC QL NAA+PROBE: NOT DETECTED
HPIV4 RNA SPEC QL NAA+PROBE: NOT DETECTED
IMM GRANULOCYTES # BLD: 0.1 10E3/UL
IMM GRANULOCYTES NFR BLD: 0 %
KETONES UR STRIP-MCNC: NEGATIVE MG/DL
LEUKOCYTE ESTERASE UR QL STRIP: NEGATIVE
LYMPHOCYTES # BLD AUTO: 1.3 10E3/UL (ref 0.8–5.3)
LYMPHOCYTES NFR BLD AUTO: 8 %
M PNEUMO DNA SPEC QL NAA+PROBE: NOT DETECTED
MCH RBC QN AUTO: 33.4 PG (ref 26.5–33)
MCHC RBC AUTO-ENTMCNC: 31.2 G/DL (ref 31.5–36.5)
MCV RBC AUTO: 107 FL (ref 78–100)
MONOCYTES # BLD AUTO: 1.2 10E3/UL (ref 0–1.3)
MONOCYTES NFR BLD AUTO: 8 %
NEUTROPHILS # BLD AUTO: 12.9 10E3/UL (ref 1.6–8.3)
NEUTROPHILS NFR BLD AUTO: 83 %
NITRATE UR QL: NEGATIVE
PH UR STRIP: 5.5 [PH] (ref 5–8)
PLATELET # BLD AUTO: 440 10E3/UL (ref 150–450)
RBC # BLD AUTO: 2.93 10E6/UL (ref 4.4–5.9)
RBC #/AREA URNS AUTO: ABNORMAL /HPF
RSV RNA SPEC QL NAA+PROBE: NOT DETECTED
RSV RNA SPEC QL NAA+PROBE: NOT DETECTED
RV+EV RNA SPEC QL NAA+PROBE: NOT DETECTED
SP GR UR STRIP: >=1.03 (ref 1–1.03)
SQUAMOUS #/AREA URNS AUTO: ABNORMAL /LPF
UROBILINOGEN UR STRIP-ACNC: 0.2 E.U./DL
WBC # BLD AUTO: 15.5 10E3/UL (ref 4–11)
WBC #/AREA URNS AUTO: ABNORMAL /HPF

## 2024-04-17 PROCEDURE — 87040 BLOOD CULTURE FOR BACTERIA: CPT | Performed by: FAMILY MEDICINE

## 2024-04-17 PROCEDURE — 80061 LIPID PANEL: CPT | Performed by: FAMILY MEDICINE

## 2024-04-17 PROCEDURE — 80048 BASIC METABOLIC PNL TOTAL CA: CPT | Performed by: FAMILY MEDICINE

## 2024-04-17 PROCEDURE — 87581 M.PNEUMON DNA AMP PROBE: CPT | Performed by: FAMILY MEDICINE

## 2024-04-17 PROCEDURE — 86803 HEPATITIS C AB TEST: CPT | Performed by: FAMILY MEDICINE

## 2024-04-17 PROCEDURE — 87486 CHLMYD PNEUM DNA AMP PROBE: CPT | Performed by: FAMILY MEDICINE

## 2024-04-17 PROCEDURE — 36415 COLL VENOUS BLD VENIPUNCTURE: CPT | Performed by: FAMILY MEDICINE

## 2024-04-17 PROCEDURE — 81001 URINALYSIS AUTO W/SCOPE: CPT | Performed by: FAMILY MEDICINE

## 2024-04-17 PROCEDURE — G2211 COMPLEX E/M VISIT ADD ON: HCPCS | Performed by: FAMILY MEDICINE

## 2024-04-17 PROCEDURE — 71046 X-RAY EXAM CHEST 2 VIEWS: CPT | Mod: TC | Performed by: RADIOLOGY

## 2024-04-17 PROCEDURE — 99214 OFFICE O/P EST MOD 30 MIN: CPT | Performed by: FAMILY MEDICINE

## 2024-04-17 PROCEDURE — 87633 RESP VIRUS 12-25 TARGETS: CPT | Mod: GY | Performed by: FAMILY MEDICINE

## 2024-04-17 PROCEDURE — 85025 COMPLETE CBC W/AUTO DIFF WBC: CPT | Performed by: FAMILY MEDICINE

## 2024-04-17 RX ORDER — RESPIRATORY SYNCYTIAL VIRUS VACCINE 120MCG/0.5
0.5 KIT INTRAMUSCULAR ONCE
Qty: 1 EACH | Refills: 0 | Status: CANCELLED | OUTPATIENT
Start: 2024-04-17 | End: 2024-04-17

## 2024-04-17 NOTE — PROGRESS NOTES
I spoke with his daughter. His highest temperature was 100 today. He is eating. He was up a lot through the night with diarrhea. The C. Dif PCR is pending. His white count was high enough and his labs look like he is a bit dehydrated, along with the whiff I experienced yesterday, I don't want to delay treatment. So I have ordered fidoxomycin twice a day for 10 days for presumptive C. Dif colitis. If the test is positive, we will repeat the test in a month to ensure recovery. The daughter is in agreement with the plan. Since he has had fevers, I do not want to have him take anything that slows the bowels.  Lyly Crouch MD    Assessment & Plan   Fever, unspecified fever cause  This appears to be evolving.  His daughter will keep him with her today. Discussed going to the ED if concerning symptoms develop.  - Respiratory Panel PCR  - CBC with platelets and differential  - Basic metabolic panel  (Ca, Cl, CO2, Creat, Gluc, K, Na, BUN)  - UA Macroscopic with reflex to Microscopic and Culture - Lab Collect  - XR Chest 2 Views  - C. difficile Toxin B PCR with reflex to C. difficile Antigen and Toxins A/B EIA  - Blood Culture Peripheral Blood    Diarrhea, unspecified type  Recent hospitalization and antibiotics.  He passed gas that had a suspicious odor of C. Dif.    Seizure disorder (H)  Medications changed. He feels good on the current regimen without side effects.    Coronary artery disease involving native heart without angina pectoris, unspecified vessel or lesion type  - Lipid panel reflex to direct LDL Non-fasting    Need for hepatitis C screening test  - Hepatitis C Screen Reflex to HCV RNA Quant and Genotype    Crohn's disease with other complication, unspecified gastrointestinal tract location (H)  - Adult GI  Referral - Consult Only    Use of cane as ambulatory aid  He reports he is staying busy at his apartment.    Underweight-- RESOLVED  He has gained 11# in the past 33 days, including a  hospitalization.  Consider getting away from sweet drinks, but continue the rest.    RETURN IN THREE MONTHS    MED REC REQUIRED  Post Medication Reconciliation Status: discharge medications reconciled, continue medications without change    Stef Murray is a 73 year old, presenting for the following health issues:  Follow Up (1 month follow up (weight)  ), Hospital F/U ( Seizure disorder with breakthrough seizure & pneumonia 4/7-4/11 ([facility])), and Med Change Request (Possible refill on abx for Pneumonia (still reports chills and fatigue) )      4/17/2024     7:44 AM   Additional Questions   Roomed by TASIA Santos   Accompanied by Daughter     Woke with the chills and tired today.  Diarrhea every couple hours since coming out of the hospital. He was treated for pneumonia there.  Getting Meals on Wheels, Ensure, fruits and several Ginger ales a day.    History of Present Illness       Reason for visit:  Follow up    He eats 0-1 servings of fruits and vegetables daily.He consumes 4 sweetened beverage(s) daily.He exercises with enough effort to increase his heart rate 9 or less minutes per day.  He exercises with enough effort to increase his heart rate 3 or less days per week. He is missing 1 dose(s) of medications per week.           4/15/2024    11:18 AM   Post Discharge Outreach   Admission Date 4/7/2024   Reason for Admission recurrent seizures, acute hypoxic respiratory failure, and pneumonia.   Discharge Date 4/11/2024   Discharge Diagnosis # Seizure disorder with breakthrough seizure   # Acute hypoxic respiratory failure  # Suspected pneumonia  # Possible ILD vs alveolar proteinosis  # Sepsis due to community acquired pneumonia vs aspiration pneumonia   # Elevated BNP   # Demand Ischemia  # Hypotension  # Thoracoabdominal Aneurysm  # Chronic macrocytic anemia   # Hx Iron deficiency  # Severe Malnutrition  # Mild to Moderate Cognitive Impairment  # Crohn's  Disease   How are you doing now that you are home? He is doing better.   How are your symptoms? (Red Flag symptoms escalate to triage hotline per guidelines) Improved   Do you feel your condition is stable enough to be safe at home until your provider visit? Yes   Does the patient have their discharge instructions?  Yes   Does the patient have questions regarding their discharge instructions?  No   Were you started on any new medications or were there changes to any of your previous medications?  Yes   Does the patient have all of their medications? Yes   Do you have questions regarding any of your medications?  No   Do you have all of your needed medical supplies or equipment (DME)?  (i.e. oxygen tank, CPAP, cane, etc.) Yes   Discharge follow-up appointment scheduled within 14 calendar days?  Yes   Discharge Follow Up Appointment Date 4/17/2024   Discharge Follow Up Appointment Scheduled with? Primary Care Provider     Hospital Follow-up Visit:    Hospital/Nursing Home/IP Rehab Facility: [facility]  Date of Admission: 4/7/24  Date of Discharge: 4/11/24  Reason(s) for Admission: Seizure disorder with breakthrough seizure  pneumonia   Was the patient in the ICU or did the patient experience delirium during hospitalization?  Yes   Do you have any other stressors you would like to discuss with your provider? No  Technically due for a mini-cognition test, but will not do due to illness.  Problems taking medications regularly:  None  Medication changes since discharge: Yes, several  (see discharge summary)  Problems adhering to non-medication therapy:  None    Summary of hospitalization:  [facility] discharge summary reviewed  Diagnostic Tests/Treatments reviewed.  Follow up needed: Scheduled to assess the aneurysm.  Other Healthcare Providers Involved in Patient s Care:         Homecare  Update since discharge: Improved, until he woke up sick.     Plan of  "care communicated with patient and family         Review of Systems  Constitutional, neuro, ENT, endocrine, pulmonary, cardiac, gastrointestinal, genitourinary, musculoskeletal, integument and psychiatric systems are negative, except as otherwise noted.      Objective    Pulse 89   Temp 100  F (37.8  C) (Tympanic)   Resp 19   Ht 1.753 m (5' 9\")   Wt 59.4 kg (131 lb)   SpO2 94%   BMI 19.35 kg/m    Body mass index is 19.35 kg/m .  Physical Exam   GENERAL: alert and no distress  EYES: Eyes grossly normal to inspection, PERRL and conjunctivae and sclerae normal  HENT: ear canals and TM's normal, nose and mouth without ulcers or lesions  NECK: no adenopathy, no asymmetry, masses, or scars  RESP: lungs clear to auscultation - no rales, rhonchi or wheezes  CV: regular rate and rhythm, normal S1 S2, no S3 or S4, no murmur, click or rub, no peripheral edema  ABDOMEN: soft, nontender, no hepatosplenomegaly, no masses and bowel sounds normal  MS: no gross musculoskeletal defects noted, no edema  SKIN: no suspicious lesions or rashes  NEURO: Normal strength and tone, mentation intact and speech normal  PSYCH: mentation appears normal, affect normal/bright  Signed Electronically by: CATHY FLORES MD    "

## 2024-04-18 ENCOUNTER — APPOINTMENT (OUTPATIENT)
Dept: LAB | Facility: CLINIC | Age: 74
End: 2024-04-18
Payer: MEDICARE

## 2024-04-18 ENCOUNTER — HOSPITAL ENCOUNTER (EMERGENCY)
Facility: CLINIC | Age: 74
Discharge: HOME | End: 2024-04-19
Attending: EMERGENCY MEDICINE | Admitting: EMERGENCY MEDICINE
Payer: MEDICARE

## 2024-04-18 VITALS
DIASTOLIC BLOOD PRESSURE: 49 MMHG | HEART RATE: 88 BPM | OXYGEN SATURATION: 95 % | BODY MASS INDEX: 19.4 KG/M2 | WEIGHT: 131 LBS | RESPIRATION RATE: 16 BRPM | SYSTOLIC BLOOD PRESSURE: 104 MMHG | TEMPERATURE: 97.5 F | HEIGHT: 69 IN

## 2024-04-18 DIAGNOSIS — A04.72 C. DIFFICILE COLITIS: ICD-10-CM

## 2024-04-18 LAB
ANION GAP SERPL CALCULATED.3IONS-SCNC: 11 MMOL/L (ref 7–15)
BUN SERPL-MCNC: 23.9 MG/DL (ref 8–23)
C DIFF GDH STL QL IA: POSITIVE
C DIFF TOX A+B STL QL IA: NEGATIVE
C DIFF TOX B STL QL: POSITIVE
CALCIUM SERPL-MCNC: 9.4 MG/DL (ref 8.8–10.2)
CHLORIDE SERPL-SCNC: 97 MMOL/L (ref 98–107)
CHOLEST SERPL-MCNC: 189 MG/DL
CREAT SERPL-MCNC: 0.88 MG/DL (ref 0.67–1.17)
DEPRECATED HCO3 PLAS-SCNC: 25 MMOL/L (ref 22–29)
EGFRCR SERPLBLD CKD-EPI 2021: 90 ML/MIN/1.73M2
FASTING STATUS PATIENT QL REPORTED: ABNORMAL
GLUCOSE SERPL-MCNC: 98 MG/DL (ref 70–99)
HCV AB SERPL QL IA: NONREACTIVE
HDLC SERPL-MCNC: 45 MG/DL
LDLC SERPL CALC-MCNC: 121 MG/DL
NONHDLC SERPL-MCNC: 144 MG/DL
POTASSIUM SERPL-SCNC: 4.5 MMOL/L (ref 3.4–5.3)
SODIUM SERPL-SCNC: 133 MMOL/L (ref 135–145)
TRIGL SERPL-MCNC: 115 MG/DL

## 2024-04-18 PROCEDURE — 258N000003 HC RX IP 258 OP 636: Performed by: EMERGENCY MEDICINE

## 2024-04-18 PROCEDURE — 87324 CLOSTRIDIUM AG IA: CPT | Mod: 59 | Performed by: FAMILY MEDICINE

## 2024-04-18 PROCEDURE — 84100 ASSAY OF PHOSPHORUS: CPT | Performed by: EMERGENCY MEDICINE

## 2024-04-18 PROCEDURE — 87493 C DIFF AMPLIFIED PROBE: CPT | Performed by: FAMILY MEDICINE

## 2024-04-18 PROCEDURE — 96360 HYDRATION IV INFUSION INIT: CPT | Performed by: EMERGENCY MEDICINE

## 2024-04-18 PROCEDURE — 99284 EMERGENCY DEPT VISIT MOD MDM: CPT | Performed by: EMERGENCY MEDICINE

## 2024-04-18 PROCEDURE — 99284 EMERGENCY DEPT VISIT MOD MDM: CPT | Mod: 25 | Performed by: EMERGENCY MEDICINE

## 2024-04-18 PROCEDURE — 80053 COMPREHEN METABOLIC PANEL: CPT | Performed by: EMERGENCY MEDICINE

## 2024-04-18 PROCEDURE — 85004 AUTOMATED DIFF WBC COUNT: CPT | Performed by: EMERGENCY MEDICINE

## 2024-04-18 PROCEDURE — 36415 COLL VENOUS BLD VENIPUNCTURE: CPT | Performed by: EMERGENCY MEDICINE

## 2024-04-18 PROCEDURE — 83735 ASSAY OF MAGNESIUM: CPT | Performed by: EMERGENCY MEDICINE

## 2024-04-18 RX ADMIN — SODIUM CHLORIDE 1000 ML: 9 INJECTION, SOLUTION INTRAVENOUS at 23:58

## 2024-04-18 ASSESSMENT — COLUMBIA-SUICIDE SEVERITY RATING SCALE - C-SSRS
1. IN THE PAST MONTH, HAVE YOU WISHED YOU WERE DEAD OR WISHED YOU COULD GO TO SLEEP AND NOT WAKE UP?: NO
2. HAVE YOU ACTUALLY HAD ANY THOUGHTS OF KILLING YOURSELF IN THE PAST MONTH?: NO
6. HAVE YOU EVER DONE ANYTHING, STARTED TO DO ANYTHING, OR PREPARED TO DO ANYTHING TO END YOUR LIFE?: NO

## 2024-04-19 ENCOUNTER — TELEPHONE (OUTPATIENT)
Dept: FAMILY MEDICINE | Facility: CLINIC | Age: 74
End: 2024-04-19
Payer: MEDICARE

## 2024-04-19 LAB
ALBUMIN SERPL BCG-MCNC: 3.8 G/DL (ref 3.5–5.2)
ALP SERPL-CCNC: 84 U/L (ref 40–150)
ALT SERPL W P-5'-P-CCNC: 15 U/L (ref 0–70)
ANION GAP SERPL CALCULATED.3IONS-SCNC: 10 MMOL/L (ref 7–15)
AST SERPL W P-5'-P-CCNC: 21 U/L (ref 0–45)
BASOPHILS # BLD AUTO: 0 10E3/UL (ref 0–0.2)
BASOPHILS NFR BLD AUTO: 0 %
BILIRUB SERPL-MCNC: 0.3 MG/DL
BUN SERPL-MCNC: 32.4 MG/DL (ref 8–23)
CALCIUM SERPL-MCNC: 9.2 MG/DL (ref 8.8–10.2)
CHLORIDE SERPL-SCNC: 100 MMOL/L (ref 98–107)
CREAT SERPL-MCNC: 0.85 MG/DL (ref 0.67–1.17)
DEPRECATED HCO3 PLAS-SCNC: 24 MMOL/L (ref 22–29)
EGFRCR SERPLBLD CKD-EPI 2021: >90 ML/MIN/1.73M2
EOSINOPHIL # BLD AUTO: 0.3 10E3/UL (ref 0–0.7)
EOSINOPHIL NFR BLD AUTO: 3 %
ERYTHROCYTE [DISTWIDTH] IN BLOOD BY AUTOMATED COUNT: 15 % (ref 10–15)
GLUCOSE SERPL-MCNC: 101 MG/DL (ref 70–99)
HCT VFR BLD AUTO: 30.9 % (ref 40–53)
HGB BLD-MCNC: 9.6 G/DL (ref 13.3–17.7)
HOLD SPECIMEN: NORMAL
HOLD SPECIMEN: NORMAL
IMM GRANULOCYTES # BLD: 0 10E3/UL
IMM GRANULOCYTES NFR BLD: 0 %
LYMPHOCYTES # BLD AUTO: 1.7 10E3/UL (ref 0.8–5.3)
LYMPHOCYTES NFR BLD AUTO: 17 %
MAGNESIUM SERPL-MCNC: 2.2 MG/DL (ref 1.7–2.3)
MCH RBC QN AUTO: 33 PG (ref 26.5–33)
MCHC RBC AUTO-ENTMCNC: 31.1 G/DL (ref 31.5–36.5)
MCV RBC AUTO: 106 FL (ref 78–100)
MONOCYTES # BLD AUTO: 0.8 10E3/UL (ref 0–1.3)
MONOCYTES NFR BLD AUTO: 8 %
NEUTROPHILS # BLD AUTO: 7.2 10E3/UL (ref 1.6–8.3)
NEUTROPHILS NFR BLD AUTO: 72 %
NRBC # BLD AUTO: 0 10E3/UL
NRBC BLD AUTO-RTO: 0 /100
PHOSPHATE SERPL-MCNC: 4.5 MG/DL (ref 2.5–4.5)
PLATELET # BLD AUTO: 448 10E3/UL (ref 150–450)
POTASSIUM SERPL-SCNC: 4.4 MMOL/L (ref 3.4–5.3)
PROT SERPL-MCNC: 7.7 G/DL (ref 6.4–8.3)
RBC # BLD AUTO: 2.91 10E6/UL (ref 4.4–5.9)
SODIUM SERPL-SCNC: 134 MMOL/L (ref 135–145)
WBC # BLD AUTO: 10 10E3/UL (ref 4–11)

## 2024-04-19 PROCEDURE — 250N000013 HC RX MED GY IP 250 OP 250 PS 637: Performed by: EMERGENCY MEDICINE

## 2024-04-19 RX ORDER — VANCOMYCIN HYDROCHLORIDE 125 MG/1
125 CAPSULE ORAL ONCE
Qty: 1 CAPSULE | Refills: 0 | Status: COMPLETED | OUTPATIENT
Start: 2024-04-19 | End: 2024-04-19

## 2024-04-19 RX ORDER — VANCOMYCIN HYDROCHLORIDE 125 MG/1
125 CAPSULE ORAL 4 TIMES DAILY
Qty: 40 CAPSULE | Refills: 0 | Status: SHIPPED | OUTPATIENT
Start: 2024-04-19 | End: 2024-04-29

## 2024-04-19 RX ADMIN — VANCOMYCIN HYDROCHLORIDE 125 MG: 125 CAPSULE ORAL at 02:33

## 2024-04-19 ASSESSMENT — ACTIVITIES OF DAILY LIVING (ADL)
ADLS_ACUITY_SCORE: 38
ADLS_ACUITY_SCORE: 38

## 2024-04-19 NOTE — TELEPHONE ENCOUNTER
Home Health Care    Reason for call:  Verbal Orders    Orders are needed for this patient.  PT: 1x a week for 4 weeks and then every other week for 4 weeks     Pt Provider: Willa    Phone Number Homecare Nurse can be reached at: [phone]       Okay to leave a detailed message?: Yes at Other phone number:  [phone]

## 2024-04-19 NOTE — ED PROVIDER NOTES
"    [place] EMERGENCY DEPARTMENT ([facility])    4/18/24       ED PROVIDER NOTE      History     Chief Complaint   Patient presents with    Diarrhea    Fatigue     HPI  Trevor Larios is a 73 year old male with a past medical history significant for cognitive impairment 2/2 dementia, acute hypoxic respiratory failure and sepsis 2/2 pneumonia (requiring recent admission 4/7 - 4/11), SIRS, CAD, thoracoabdominal aortic aneurysm (requiring admission 2/2024), Crohn's disease, and seizure disorder who presents to the ED for evaluation of ongoing diarrhea, weakness, and chills with recent diagnosis of c diff colitis that came back today in the outpatient setting.  Patient also reports worsening fatigue.  He has some abdominal cramping with bowel movements but otherwise no abdominal pain at rest and the pain is tolerable.  He is tolerating p.o. intake without vomiting.  He has not had known history of C. difficile colitis in the past.  Denies fevers or chills.    According to the son-in-law who is with the patient today, they went to fill his prescription for fidaxomicin, however they are having insurance issues and were told the medication would cost somewhere between $5000 and $7000.  They present for evaluation and consideration of other therapy options.    Past Medical History  Past Medical History:   Diagnosis Date    Crohn's disease (H)     Dementia (H) 2023    \"moderate.\"     Past Surgical History:   Procedure Laterality Date    ORIF HIP FRACTURE  2022    SHOULDER SURGERY Right     2022?     vancomycin (VANCOCIN) 125 MG capsule  aspirin (ASA) 81 MG chewable tablet  cyanocobalamin (VITAMIN B-12) 500 MCG SUBL sublingual tablet  ferrous sulfate (FEROSUL) 325 (65 Fe) MG tablet  fidaxomicin (DIFICID) 200 MG tablet  levETIRAcetam (KEPPRA) 1000 MG tablet  levETIRAcetam (KEPPRA) 750 MG tablet  midodrine (PROAMATINE) 5 MG tablet  multivitamin w/minerals (THERA-VIT-M) tablet  thiamine (B-1) 100 MG tablet  vitamin C " "(ASCORBIC ACID) 500 MG tablet  Vitamin D, Cholecalciferol, 25 MCG (1000 UT) CAPS      No Known Allergies  Family History  No family history on file.  Social History   Social History     Tobacco Use    Smoking status: Never    Smokeless tobacco: Never   Vaping Use    Vaping status: Never Used      Past medical history, past surgical history, medications, allergies, family history, and social history were reviewed with the patient. No additional pertinent items.      A complete review of systems was performed with pertinent positives and negatives noted in the HPI, and all other systems negative.    Physical Exam   BP: 104/49  Pulse: 88  Temp: 97.5  F (36.4  C)  Resp: 16  Height: 175.3 cm (5' 9\")  Weight: 59.4 kg (131 lb)  SpO2: 95 %  Physical Exam  Physical Exam   Constitutional: Pt is oriented to person, place, and time.Pt appears well-developed and well-nourished.   HENT:   Head: Normocephalic and atraumatic.   Eyes: Conjunctivae are normal. Pupils are equal, round, and reactive to light.   Neck: Normal range of motion. Neck supple.   Cardiovascular: Normal rate, regular rhythm, normal heart sounds and intact distal pulses.    Pulmonary/Chest: Effort normal and breath sounds normal. No respiratory distress. Pt has no wheezes. Pt has no rales  Abdominal: Soft. Bowel sounds are normal. Pt exhibits no distension and no mass. No tenderness. Pt has no rebound and no guarding.   Musculoskeletal: Normal range of motion. Pt exhibits no edema.   Neurological: Pt is alert and oriented to person, place, and time. Normal reflexes.   Skin: Skin is warm and dry. No rash noted.   Psychiatric: Pt has a normal mood and affect. Behavior is normal. Judgment and thought content normal.     ED Course, Procedures, & Data      Procedures              Results for orders placed or performed during the hospital encounter of 04/18/24   Comprehensive metabolic panel     Status: Abnormal   Result Value Ref Range    Sodium 134 (L) 135 - 145 " mmol/L    Potassium 4.4 3.4 - 5.3 mmol/L    Carbon Dioxide (CO2) 24 22 - 29 mmol/L    Anion Gap 10 7 - 15 mmol/L    Urea Nitrogen 32.4 (H) 8.0 - 23.0 mg/dL    Creatinine 0.85 0.67 - 1.17 mg/dL    GFR Estimate >90 >60 mL/min/1.73m2    Calcium 9.2 8.8 - 10.2 mg/dL    Chloride 100 98 - 107 mmol/L    Glucose 101 (H) 70 - 99 mg/dL    Alkaline Phosphatase 84 40 - 150 U/L    AST 21 0 - 45 U/L    ALT 15 0 - 70 U/L    Protein Total 7.7 6.4 - 8.3 g/dL    Albumin 3.8 3.5 - 5.2 g/dL    Bilirubin Total 0.3 <=1.2 mg/dL   Magnesium     Status: Normal   Result Value Ref Range    Magnesium 2.2 1.7 - 2.3 mg/dL   Phosphorus     Status: Normal   Result Value Ref Range    Phosphorus 4.5 2.5 - 4.5 mg/dL   CBC with platelets and differential     Status: Abnormal   Result Value Ref Range    WBC Count 10.0 4.0 - 11.0 10e3/uL    RBC Count 2.91 (L) 4.40 - 5.90 10e6/uL    Hemoglobin 9.6 (L) 13.3 - 17.7 g/dL    Hematocrit 30.9 (L) 40.0 - 53.0 %     (H) 78 - 100 fL    MCH 33.0 26.5 - 33.0 pg    MCHC 31.1 (L) 31.5 - 36.5 g/dL    RDW 15.0 10.0 - 15.0 %    Platelet Count 448 150 - 450 10e3/uL    % Neutrophils 72 %    % Lymphocytes 17 %    % Monocytes 8 %    % Eosinophils 3 %    % Basophils 0 %    % Immature Granulocytes 0 %    NRBCs per 100 WBC 0 <1 /100    Absolute Neutrophils 7.2 1.6 - 8.3 10e3/uL    Absolute Lymphocytes 1.7 0.8 - 5.3 10e3/uL    Absolute Monocytes 0.8 0.0 - 1.3 10e3/uL    Absolute Eosinophils 0.3 0.0 - 0.7 10e3/uL    Absolute Basophils 0.0 0.0 - 0.2 10e3/uL    Absolute Immature Granulocytes 0.0 <=0.4 10e3/uL    Absolute NRBCs 0.0 10e3/uL   Extra Tube     Status: None    Narrative    The following orders were created for panel order Extra Tube.  Procedure                               Abnormality         Status                     ---------                               -----------         ------                     Extra Blue Top Tube[[id number]]                              Final result               Extra Red Top  Tube[[id number]]                               Final result                 Please view results for these tests on the individual orders.   Extra Blue Top Tube     Status: None   Result Value Ref Range    Hold Specimen JIC    Extra Red Top Tube     Status: None   Result Value Ref Range    Hold Specimen JIC    CBC with Platelets & Differential     Status: Abnormal    Narrative    The following orders were created for panel order CBC with Platelets & Differential.  Procedure                               Abnormality         Status                     ---------                               -----------         ------                     CBC with platelets and d...[[id number]]  Abnormal            Final result                 Please view results for these tests on the individual orders.     Medications   vancomycin (VANCOCIN) capsule 125 mg (has no administration in time range)   sodium chloride 0.9% BOLUS 1,000 mL (0 mLs Intravenous Stopped 4/19/24 0110)     Labs Ordered and Resulted from Time of ED Arrival to Time of ED Departure   COMPREHENSIVE METABOLIC PANEL - Abnormal       Result Value    Sodium 134 (*)     Potassium 4.4      Carbon Dioxide (CO2) 24      Anion Gap 10      Urea Nitrogen 32.4 (*)     Creatinine 0.85      GFR Estimate >90      Calcium 9.2      Chloride 100      Glucose 101 (*)     Alkaline Phosphatase 84      AST 21      ALT 15      Protein Total 7.7      Albumin 3.8      Bilirubin Total 0.3     CBC WITH PLATELETS AND DIFFERENTIAL - Abnormal    WBC Count 10.0      RBC Count 2.91 (*)     Hemoglobin 9.6 (*)     Hematocrit 30.9 (*)      (*)     MCH 33.0      MCHC 31.1 (*)     RDW 15.0      Platelet Count 448      % Neutrophils 72      % Lymphocytes 17      % Monocytes 8      % Eosinophils 3      % Basophils 0      % Immature Granulocytes 0      NRBCs per 100 WBC 0      Absolute Neutrophils 7.2      Absolute Lymphocytes 1.7      Absolute Monocytes 0.8      Absolute Eosinophils 0.3      Absolute  Basophils 0.0      Absolute Immature Granulocytes 0.0      Absolute NRBCs 0.0     MAGNESIUM - Normal    Magnesium 2.2     PHOSPHORUS - Normal    Phosphorus 4.5       No orders to display          Critical care was not performed.     Medical Decision Making  The patient's presentation was of moderate complexity (an acute illness with systemic symptoms).    The patient's evaluation involved:  review of external note(s) from 1 sources (see separate area of note for details)  review of 3+ test result(s) ordered prior to this encounter (see separate area of note for details)  ordering and/or review of 3+ test(s) in this encounter (see separate area of note for details)    The patient's management necessitated moderate risk (prescription drug management including medications given in the ED).    Assessment & Plan      Trevor Larios is a 73 year old male with a past medical history significant for cognitive impairment 2/2 dementia, acute hypoxic respiratory failure and sepsis 2/2 pneumonia (requiring recent admission 4/7 - 4/11), SIRS, CAD, thoracoabdominal aortic aneurysm (requiring admission 2/2024), Crohn's disease, and seizure disorder who presents to the ED for evaluation of ongoing diarrhea, weakness, and chills with recent diagnosis of c diff colitis that came back today in the outpatient setting.  Patient is nontoxic-appearing on arrival, has vital signs within normal limits. He has no abdominal tenderness on exam.  C. difficile studies were reviewed and did come back positive.  Lab work obtained here including a CBC which showed white blood cell count of 10.0, normal creatinine at 0.85, normal electrolytes including sodium, potassium, chloride and magnesium levels at this time.  No concern for toxic megacolon based on clinical appearance, normal white count, and nontender exam.    I reviewed medication therapies for first-time presentation of C. difficile colitis, and oral vancomycin appears noninferior and many  studies to fidaxomicin.  Given the cost of fidaxomicin mycin and that it is cost prohibitive at this time, I prescribed oral vancomycin and gave him first dose here in the department.  I found and printed a GoodRx coupon so the patient can get antibiotics filled for $40 for this course of treatment.  Patient discharged with outpatient follow-up and return precautions.    I have reviewed the nursing notes. I have reviewed the findings, diagnosis, plan and need for follow up with the patient.    New Prescriptions    VANCOMYCIN (VANCOCIN) 125 MG CAPSULE    Take 1 capsule (125 mg) by mouth 4 times daily for 10 days       Final diagnoses:   C. difficile colitis   IAiyana, am serving as a trained medical scribe to document services personally performed by Milton Baldwin MD, based on the provider's statements to me.     IMilton MD, was physically present and have reviewed and verified the accuracy of this note documented by Aiyana Post.      Milton Baldwin MD  [facility] EMERGENCY DEPARTMENT  4/18/2024     Milton Baldwin MD  04/19/24 0356

## 2024-04-19 NOTE — ED TRIAGE NOTES
Pt presents with c/o ongoing diarrhea, weakness and chills. Pt states he was recently admitted for same concerns. Denies vomiting     Triage Assessment (Adult)       Row Name 04/18/24 2897          Triage Assessment    Airway WDL WDL        Respiratory WDL    Respiratory WDL WDL        Skin Circulation/Temperature WDL    Skin Circulation/Temperature WDL WDL        Cardiac WDL    Cardiac WDL WDL        Peripheral/Neurovascular WDL    Peripheral Neurovascular WDL WDL        Cognitive/Neuro/Behavioral WDL    Cognitive/Neuro/Behavioral WDL WDL

## 2024-04-22 LAB — BACTERIA BLD CULT: NO GROWTH

## 2024-04-22 NOTE — TELEPHONE ENCOUNTER
Home Care is calling regarding an established patient with [facility].        4/22/2024    10:42 AM   Home Care Information   Current following provider Willa   [facility]     Requesting orders from: Lyly Crouch  Provider is following patient: Yes  Is this a 60-day recertification request?  No    Orders Requested    Physical Therapy  Request for initial certification (first set of orders)   Frequency:  1x/wk for 4 wks   then every other week for 4 weeks      Verbal orders given.  Home Care will send orders for provider to sign.  Information was gathered and will be sent to provider for review.  RN will contact Home Care with information after provider review.  Confirmed ok to leave a detailed message with call back.  Contact information confirmed and updated as needed.  See below for provider approval. Left secure voicemail as requested.    Adolph Mace RN

## 2024-04-24 ENCOUNTER — HOSPITAL ENCOUNTER (OUTPATIENT)
Facility: CLINIC | Age: 74
Setting detail: OBSERVATION
Discharge: HOME | End: 2024-04-25
Attending: EMERGENCY MEDICINE | Admitting: EMERGENCY MEDICINE
Payer: MEDICARE

## 2024-04-24 ENCOUNTER — APPOINTMENT (OUTPATIENT)
Dept: GENERAL RADIOLOGY | Facility: CLINIC | Age: 74
End: 2024-04-24
Payer: MEDICARE

## 2024-04-24 ENCOUNTER — APPOINTMENT (OUTPATIENT)
Dept: CT IMAGING | Facility: CLINIC | Age: 74
End: 2024-04-24
Payer: MEDICARE

## 2024-04-24 DIAGNOSIS — S61.211A LACERATION OF LEFT INDEX FINGER WITHOUT FOREIGN BODY WITHOUT DAMAGE TO NAIL, INITIAL ENCOUNTER: ICD-10-CM

## 2024-04-24 DIAGNOSIS — Z91.148 NONCOMPLIANCE WITH MEDICATION REGIMEN: ICD-10-CM

## 2024-04-24 DIAGNOSIS — S01.81XA LACERATION OF FOREHEAD, INITIAL ENCOUNTER: ICD-10-CM

## 2024-04-24 DIAGNOSIS — R29.6 FALLS FREQUENTLY: ICD-10-CM

## 2024-04-24 DIAGNOSIS — G40.909 SEIZURE DISORDER (H): Primary | ICD-10-CM

## 2024-04-24 DIAGNOSIS — R62.7 FAILURE TO THRIVE IN ADULT: ICD-10-CM

## 2024-04-24 DIAGNOSIS — F05 POST-ICTAL CONFUSION: ICD-10-CM

## 2024-04-24 LAB
ALBUMIN SERPL BCG-MCNC: 3.8 G/DL (ref 3.5–5.2)
ALBUMIN UR-MCNC: 30 MG/DL
ALBUMIN UR-MCNC: NEGATIVE MG/DL
ALP SERPL-CCNC: 84 U/L (ref 40–150)
ALT SERPL W P-5'-P-CCNC: 16 U/L (ref 0–70)
ANION GAP SERPL CALCULATED.3IONS-SCNC: 11 MMOL/L (ref 7–15)
APPEARANCE UR: CLEAR
APPEARANCE UR: CLEAR
AST SERPL W P-5'-P-CCNC: 25 U/L (ref 0–45)
ATRIAL RATE - MUSE: 71 BPM
BASOPHILS # BLD AUTO: 0 10E3/UL (ref 0–0.2)
BASOPHILS NFR BLD AUTO: 1 %
BILIRUB SERPL-MCNC: 0.4 MG/DL
BILIRUB UR QL STRIP: NEGATIVE
BILIRUB UR QL STRIP: NEGATIVE
BUN SERPL-MCNC: 21.2 MG/DL (ref 8–23)
CALCIUM SERPL-MCNC: 9.3 MG/DL (ref 8.8–10.2)
CHLORIDE SERPL-SCNC: 101 MMOL/L (ref 98–107)
CK SERPL-CCNC: 80 U/L (ref 39–308)
COLOR UR AUTO: NORMAL
COLOR UR AUTO: YELLOW
CREAT SERPL-MCNC: 0.94 MG/DL (ref 0.67–1.17)
DEPRECATED HCO3 PLAS-SCNC: 24 MMOL/L (ref 22–29)
DIASTOLIC BLOOD PRESSURE - MUSE: NORMAL MMHG
EGFRCR SERPLBLD CKD-EPI 2021: 85 ML/MIN/1.73M2
EOSINOPHIL # BLD AUTO: 0.1 10E3/UL (ref 0–0.7)
EOSINOPHIL NFR BLD AUTO: 1 %
ERYTHROCYTE [DISTWIDTH] IN BLOOD BY AUTOMATED COUNT: 15.1 % (ref 10–15)
GLUCOSE SERPL-MCNC: 96 MG/DL (ref 70–99)
GLUCOSE UR STRIP-MCNC: NEGATIVE MG/DL
GLUCOSE UR STRIP-MCNC: NEGATIVE MG/DL
HCT VFR BLD AUTO: 29.1 % (ref 40–53)
HGB BLD-MCNC: 9.2 G/DL (ref 13.3–17.7)
HGB UR QL STRIP: NEGATIVE
HGB UR QL STRIP: NEGATIVE
HOLD SPECIMEN: NORMAL
HYALINE CASTS: 15 /LPF
IMM GRANULOCYTES # BLD: 0.2 10E3/UL
IMM GRANULOCYTES NFR BLD: 2 %
INTERPRETATION ECG - MUSE: NORMAL
KETONES UR STRIP-MCNC: NEGATIVE MG/DL
KETONES UR STRIP-MCNC: NEGATIVE MG/DL
LEUKOCYTE ESTERASE UR QL STRIP: NEGATIVE
LEUKOCYTE ESTERASE UR QL STRIP: NEGATIVE
LEVETIRACETAM SERPL-MCNC: 18.7 ΜG/ML (ref 10–40)
LYMPHOCYTES # BLD AUTO: 1.5 10E3/UL (ref 0.8–5.3)
LYMPHOCYTES NFR BLD AUTO: 18 %
MCH RBC QN AUTO: 33.8 PG (ref 26.5–33)
MCHC RBC AUTO-ENTMCNC: 31.6 G/DL (ref 31.5–36.5)
MCV RBC AUTO: 107 FL (ref 78–100)
MONOCYTES # BLD AUTO: 0.8 10E3/UL (ref 0–1.3)
MONOCYTES NFR BLD AUTO: 10 %
MUCOUS THREADS #/AREA URNS LPF: PRESENT /LPF
NEUTROPHILS # BLD AUTO: 6.1 10E3/UL (ref 1.6–8.3)
NEUTROPHILS NFR BLD AUTO: 68 %
NITRATE UR QL: NEGATIVE
NITRATE UR QL: NEGATIVE
NRBC # BLD AUTO: 0 10E3/UL
NRBC BLD AUTO-RTO: 0 /100
P AXIS - MUSE: 63 DEGREES
PH UR STRIP: 6.5 [PH] (ref 5–7)
PH UR STRIP: 7 [PH] (ref 5–7)
PLATELET # BLD AUTO: 454 10E3/UL (ref 150–450)
POTASSIUM SERPL-SCNC: 4.7 MMOL/L (ref 3.4–5.3)
PR INTERVAL - MUSE: 178 MS
PROT SERPL-MCNC: 7.4 G/DL (ref 6.4–8.3)
QRS DURATION - MUSE: 84 MS
QT - MUSE: 440 MS
QTC - MUSE: 478 MS
R AXIS - MUSE: -13 DEGREES
RBC # BLD AUTO: 2.72 10E6/UL (ref 4.4–5.9)
RBC URINE: 1 /HPF
RBC URINE: 2 /HPF
SODIUM SERPL-SCNC: 136 MMOL/L (ref 135–145)
SP GR UR STRIP: 1.01 (ref 1–1.03)
SP GR UR STRIP: 1.02 (ref 1–1.03)
SQUAMOUS EPITHELIAL: 1 /HPF
SQUAMOUS EPITHELIAL: <1 /HPF
SYSTOLIC BLOOD PRESSURE - MUSE: NORMAL MMHG
T AXIS - MUSE: 42 DEGREES
TROPONIN T SERPL HS-MCNC: 34 NG/L
TROPONIN T SERPL HS-MCNC: 35 NG/L
UROBILINOGEN UR STRIP-MCNC: NORMAL MG/DL
UROBILINOGEN UR STRIP-MCNC: NORMAL MG/DL
VENTRICULAR RATE- MUSE: 71 BPM
WBC # BLD AUTO: 8.7 10E3/UL (ref 4–11)
WBC URINE: 2 /HPF
WBC URINE: 5 /HPF

## 2024-04-24 PROCEDURE — 90471 IMMUNIZATION ADMIN: CPT

## 2024-04-24 PROCEDURE — 82247 BILIRUBIN TOTAL: CPT

## 2024-04-24 PROCEDURE — G0378 HOSPITAL OBSERVATION PER HR: HCPCS

## 2024-04-24 PROCEDURE — 36415 COLL VENOUS BLD VENIPUNCTURE: CPT | Performed by: EMERGENCY MEDICINE

## 2024-04-24 PROCEDURE — 250N000013 HC RX MED GY IP 250 OP 250 PS 637

## 2024-04-24 PROCEDURE — 93005 ELECTROCARDIOGRAM TRACING: CPT | Performed by: EMERGENCY MEDICINE

## 2024-04-24 PROCEDURE — 99285 EMERGENCY DEPT VISIT HI MDM: CPT | Mod: 25 | Performed by: EMERGENCY MEDICINE

## 2024-04-24 PROCEDURE — 36415 COLL VENOUS BLD VENIPUNCTURE: CPT

## 2024-04-24 PROCEDURE — 250N000011 HC RX IP 250 OP 636: Performed by: STUDENT IN AN ORGANIZED HEALTH CARE EDUCATION/TRAINING PROGRAM

## 2024-04-24 PROCEDURE — 81001 URINALYSIS AUTO W/SCOPE: CPT | Mod: 91

## 2024-04-24 PROCEDURE — 81001 URINALYSIS AUTO W/SCOPE: CPT

## 2024-04-24 PROCEDURE — G1010 CDSM STANSON: HCPCS | Performed by: STUDENT IN AN ORGANIZED HEALTH CARE EDUCATION/TRAINING PROGRAM

## 2024-04-24 PROCEDURE — 71275 CT ANGIOGRAPHY CHEST: CPT | Mod: 26 | Performed by: RADIOLOGY

## 2024-04-24 PROCEDURE — 99223 1ST HOSP IP/OBS HIGH 75: CPT | Mod: FS

## 2024-04-24 PROCEDURE — 93010 ELECTROCARDIOGRAM REPORT: CPT | Mod: 59 | Performed by: EMERGENCY MEDICINE

## 2024-04-24 PROCEDURE — 70450 CT HEAD/BRAIN W/O DYE: CPT | Mod: MG

## 2024-04-24 PROCEDURE — 99207 PR APP CREDIT; MD BILLING SHARED VISIT: CPT | Performed by: INTERNAL MEDICINE

## 2024-04-24 PROCEDURE — 80177 DRUG SCRN QUAN LEVETIRACETAM: CPT

## 2024-04-24 PROCEDURE — 84484 ASSAY OF TROPONIN QUANT: CPT | Mod: 91

## 2024-04-24 PROCEDURE — 12001 RPR S/N/AX/GEN/TRNK 2.5CM/<: CPT | Performed by: EMERGENCY MEDICINE

## 2024-04-24 PROCEDURE — 82550 ASSAY OF CK (CPK): CPT

## 2024-04-24 PROCEDURE — G1010 CDSM STANSON: HCPCS | Performed by: RADIOLOGY

## 2024-04-24 PROCEDURE — 96365 THER/PROPH/DIAG IV INF INIT: CPT | Mod: 59 | Performed by: EMERGENCY MEDICINE

## 2024-04-24 PROCEDURE — 12011 RPR F/E/E/N/L/M 2.5 CM/<: CPT | Performed by: EMERGENCY MEDICINE

## 2024-04-24 PROCEDURE — 72170 X-RAY EXAM OF PELVIS: CPT

## 2024-04-24 PROCEDURE — 84450 TRANSFERASE (AST) (SGOT): CPT

## 2024-04-24 PROCEDURE — 90715 TDAP VACCINE 7 YRS/> IM: CPT

## 2024-04-24 PROCEDURE — 12001 RPR S/N/AX/GEN/TRNK 2.5CM/<: CPT | Mod: XS | Performed by: EMERGENCY MEDICINE

## 2024-04-24 PROCEDURE — 85025 COMPLETE CBC W/AUTO DIFF WBC: CPT

## 2024-04-24 PROCEDURE — 74175 CTA ABDOMEN W/CONTRAST: CPT | Mod: 26 | Performed by: RADIOLOGY

## 2024-04-24 PROCEDURE — 70450 CT HEAD/BRAIN W/O DYE: CPT | Mod: 26 | Performed by: STUDENT IN AN ORGANIZED HEALTH CARE EDUCATION/TRAINING PROGRAM

## 2024-04-24 PROCEDURE — 74175 CTA ABDOMEN W/CONTRAST: CPT | Mod: MG

## 2024-04-24 PROCEDURE — 250N000011 HC RX IP 250 OP 636

## 2024-04-24 PROCEDURE — 72170 X-RAY EXAM OF PELVIS: CPT | Mod: 26 | Performed by: RADIOLOGY

## 2024-04-24 PROCEDURE — 12011 RPR F/E/E/N/L/M 2.5 CM/<: CPT | Mod: GC | Performed by: EMERGENCY MEDICINE

## 2024-04-24 RX ORDER — ACETAMINOPHEN 325 MG/1
975 TABLET ORAL 3 TIMES DAILY
Status: DISCONTINUED | OUTPATIENT
Start: 2024-04-24 | End: 2024-04-25 | Stop reason: HOSPADM

## 2024-04-24 RX ORDER — VANCOMYCIN HYDROCHLORIDE 125 MG/1
125 CAPSULE ORAL 4 TIMES DAILY
Qty: 32 CAPSULE | Refills: 0 | Status: DISCONTINUED | OUTPATIENT
Start: 2024-04-24 | End: 2024-04-25

## 2024-04-24 RX ORDER — ACETAMINOPHEN 325 MG/1
975 TABLET ORAL ONCE
Status: COMPLETED | OUTPATIENT
Start: 2024-04-24 | End: 2024-04-24

## 2024-04-24 RX ORDER — MIDODRINE HYDROCHLORIDE 5 MG/1
10 TABLET ORAL 3 TIMES DAILY
Status: DISCONTINUED | OUTPATIENT
Start: 2024-04-24 | End: 2024-04-25 | Stop reason: HOSPADM

## 2024-04-24 RX ORDER — ASPIRIN 81 MG/1
81 TABLET, CHEWABLE ORAL EVERY EVENING
Status: DISCONTINUED | OUTPATIENT
Start: 2024-04-24 | End: 2024-04-25 | Stop reason: HOSPADM

## 2024-04-24 RX ORDER — ONDANSETRON 2 MG/ML
4 INJECTION INTRAMUSCULAR; INTRAVENOUS EVERY 6 HOURS PRN
Status: DISCONTINUED | OUTPATIENT
Start: 2024-04-24 | End: 2024-04-25 | Stop reason: HOSPADM

## 2024-04-24 RX ORDER — ONDANSETRON 4 MG/1
4 TABLET, ORALLY DISINTEGRATING ORAL EVERY 6 HOURS PRN
Status: DISCONTINUED | OUTPATIENT
Start: 2024-04-24 | End: 2024-04-25 | Stop reason: HOSPADM

## 2024-04-24 RX ORDER — AMOXICILLIN 250 MG
2 CAPSULE ORAL 2 TIMES DAILY PRN
Status: DISCONTINUED | OUTPATIENT
Start: 2024-04-24 | End: 2024-04-25 | Stop reason: HOSPADM

## 2024-04-24 RX ORDER — LIDOCAINE HYDROCHLORIDE 10 MG/ML
INJECTION, SOLUTION EPIDURAL; INFILTRATION; INTRACAUDAL; PERINEURAL
Status: DISCONTINUED
Start: 2024-04-24 | End: 2024-04-24 | Stop reason: HOSPADM

## 2024-04-24 RX ORDER — ACETAMINOPHEN 650 MG/1
650 SUPPOSITORY RECTAL EVERY 4 HOURS PRN
Status: DISCONTINUED | OUTPATIENT
Start: 2024-04-24 | End: 2024-04-24

## 2024-04-24 RX ORDER — ACETAMINOPHEN 325 MG/1
650 TABLET ORAL EVERY 4 HOURS PRN
Status: DISCONTINUED | OUTPATIENT
Start: 2024-04-24 | End: 2024-04-24

## 2024-04-24 RX ORDER — POLYETHYLENE GLYCOL 3350 17 G/17G
17 POWDER, FOR SOLUTION ORAL 2 TIMES DAILY PRN
Status: DISCONTINUED | OUTPATIENT
Start: 2024-04-24 | End: 2024-04-25 | Stop reason: HOSPADM

## 2024-04-24 RX ORDER — ACETAMINOPHEN 650 MG/1
650 SUPPOSITORY RECTAL 3 TIMES DAILY
Status: DISCONTINUED | OUTPATIENT
Start: 2024-04-24 | End: 2024-04-25 | Stop reason: HOSPADM

## 2024-04-24 RX ORDER — AMOXICILLIN 250 MG
1 CAPSULE ORAL 2 TIMES DAILY PRN
Status: DISCONTINUED | OUTPATIENT
Start: 2024-04-24 | End: 2024-04-25 | Stop reason: HOSPADM

## 2024-04-24 RX ORDER — FERROUS SULFATE 325(65) MG
325 TABLET ORAL DAILY
Status: DISCONTINUED | OUTPATIENT
Start: 2024-04-25 | End: 2024-04-25 | Stop reason: HOSPADM

## 2024-04-24 RX ORDER — LIDOCAINE 4 G/G
2 PATCH TOPICAL
Status: DISCONTINUED | OUTPATIENT
Start: 2024-04-24 | End: 2024-04-25 | Stop reason: HOSPADM

## 2024-04-24 RX ORDER — LEVETIRACETAM 10 MG/ML
1000 INJECTION INTRAVASCULAR ONCE
Status: COMPLETED | OUTPATIENT
Start: 2024-04-24 | End: 2024-04-24

## 2024-04-24 RX ORDER — IOPAMIDOL 755 MG/ML
90 INJECTION, SOLUTION INTRAVASCULAR ONCE
Status: COMPLETED | OUTPATIENT
Start: 2024-04-24 | End: 2024-04-24

## 2024-04-24 RX ORDER — LEVETIRACETAM 750 MG/1
750 TABLET ORAL EVERY MORNING
Status: DISCONTINUED | OUTPATIENT
Start: 2024-04-25 | End: 2024-04-25 | Stop reason: HOSPADM

## 2024-04-24 RX ORDER — LEVETIRACETAM 500 MG/1
1000 TABLET ORAL EVERY EVENING
Status: DISCONTINUED | OUTPATIENT
Start: 2024-04-24 | End: 2024-04-25 | Stop reason: HOSPADM

## 2024-04-24 RX ADMIN — ACETAMINOPHEN 650 MG: 325 TABLET, FILM COATED ORAL at 19:58

## 2024-04-24 RX ADMIN — IOPAMIDOL 90 ML: 755 INJECTION, SOLUTION INTRAVENOUS at 13:36

## 2024-04-24 RX ADMIN — VANCOMYCIN HYDROCHLORIDE 125 MG: 125 CAPSULE ORAL at 14:08

## 2024-04-24 RX ADMIN — ACETAMINOPHEN 975 MG: 325 TABLET, FILM COATED ORAL at 15:44

## 2024-04-24 RX ADMIN — ASPIRIN 81 MG CHEWABLE TABLET 81 MG: 81 TABLET CHEWABLE at 19:58

## 2024-04-24 RX ADMIN — CLOSTRIDIUM TETANI TOXOID ANTIGEN (FORMALDEHYDE INACTIVATED), CORYNEBACTERIUM DIPHTHERIAE TOXOID ANTIGEN (FORMALDEHYDE INACTIVATED), BORDETELLA PERTUSSIS TOXOID ANTIGEN (GLUTARALDEHYDE INACTIVATED), BORDETELLA PERTUSSIS FILAMENTOUS HEMAGGLUTININ ANTIGEN (FORMALDEHYDE INACTIVATED), BORDETELLA PERTUSSIS PERTACTIN ANTIGEN, AND BORDETELLA PERTUSSIS FIMBRIAE 2/3 ANTIGEN 0.5 ML: 5; 2; 2.5; 5; 3; 5 INJECTION, SUSPENSION INTRAMUSCULAR at 13:11

## 2024-04-24 RX ADMIN — MIDODRINE HYDROCHLORIDE 10 MG: 5 TABLET ORAL at 19:58

## 2024-04-24 RX ADMIN — VANCOMYCIN HYDROCHLORIDE 125 MG: 125 CAPSULE ORAL at 19:58

## 2024-04-24 RX ADMIN — LEVETIRACETAM 1000 MG: 10 INJECTION INTRAVENOUS at 12:15

## 2024-04-24 RX ADMIN — LEVETIRACETAM 1000 MG: 500 TABLET, FILM COATED ORAL at 20:19

## 2024-04-24 ASSESSMENT — ACTIVITIES OF DAILY LIVING (ADL)
ADLS_ACUITY_SCORE: 38
ADLS_ACUITY_SCORE: 36
ADLS_ACUITY_SCORE: 38
ADLS_ACUITY_SCORE: 36
ADLS_ACUITY_SCORE: 38
ADLS_ACUITY_SCORE: 36
ADLS_ACUITY_SCORE: 38

## 2024-04-24 ASSESSMENT — COLUMBIA-SUICIDE SEVERITY RATING SCALE - C-SSRS: IS THE PATIENT NOT ABLE TO COMPLETE C-SSRS: REFUSES TO ANSWER

## 2024-04-24 NOTE — CONSULTS
Consult acknowledged. See progress note written earlier today by writer.    ________________    PAULINO Travis, [facility]  ED/Observation   [facility]  Phone: [phone]  Pager: [phone]  Fax: [phone]    On-call pager, [phone], 4:00pm to midnight     After hours [employer] [place] and After Hours [employer] [place]

## 2024-04-24 NOTE — PROGRESS NOTES
Care Management Initial Consult    General Information  Assessment completed with: Children,    Type of CM/SW Visit: Initial Assessment    Primary Care Provider verified and updated as needed: Yes   Readmission within the last 30 days: no previous admission in last 30 days      Reason for Consult: community resources, discharge planning  Advance Care Planning: Advance Care Planning Reviewed:  (Not assessed)          Communication Assessment  Patient's communication style: spoken language (English or Bilingual)             Cognitive  Cognitive/Neuro/Behavioral: WDL                      Living Environment:   People in home: alone     Current living Arrangements: independent living facility      Able to return to prior arrangements:  (TBD)       Family/Social Support:  Care provided by: child(maurizio)  Provides care for: no one  Marital Status:   Children          Description of Support System: Involved    Support Assessment: Lacks necessary supervision and assistance, Lacks adequate physical care, Caregiver experiencing high stress    Current Resources:   Patient receiving home care services: No     Community Resources: Home Care  Equipment currently used at home:    Supplies currently used at home: None    Employment/Financial:  Employment Status: retired        Financial Concerns: other (see comments) (Unable to assess)   Referral to Financial Worker: No       Does the patient's insurance plan have a 3 day qualifying hospital stay waiver?  No    Lifestyle & Psychosocial Needs:  Social Determinants of Health     Food Insecurity: Unknown (3/15/2024)    Food Insecurity     Within the past 12 months, did you worry that your food would run out before you got money to buy more?: Patient declined     Within the past 12 months, did the food you bought just not last and you didn t have money to get more?: Patient declined   Depression: Not at risk (3/15/2024)    PHQ-2     PHQ-2 Score: 0   Housing Stability: Unknown  (3/15/2024)    Housing Stability     Do you have housing? : Patient declined     Are you worried about losing your housing?: Patient declined   Tobacco Use: Low Risk  (4/17/2024)    Patient History     Smoking Tobacco Use: Never     Smokeless Tobacco Use: Never     Passive Exposure: Not on file   Financial Resource Strain: Unknown (3/15/2024)    Financial Resource Strain     Within the past 12 months, have you or your family members you live with been unable to get utilities (heat, electricity) when it was really needed?: Patient declined   Alcohol Use: Not on file   Transportation Needs: Unknown (3/15/2024)    Transportation Needs     Within the past 12 months, has lack of transportation kept you from medical appointments, getting your medicines, non-medical meetings or appointments, work, or from getting things that you need?: Patient declined   Physical Activity: Not on file   Interpersonal Safety: Not on file   Stress: Not on file   Social Connections: Not on file   Health Literacy: Not on file       Functional Status:  Prior to admission patient needed assistance:   Dependent ADLs::  (Unable to assess)  Dependent IADLs::  (Unable to assess)  Assesssment of Functional Status:  (TBD)    Mental Health Status:  Mental Health Status:  (Unable to assess)       Chemical Dependency Status:  Chemical Dependency Status:  (Unable to assess)             Values/Beliefs:  Spiritual, Cultural Beliefs, Congregational Practices, Values that affect care:  (Unable to assess)       Cultural/Congregational Practices Patient Routinely Participates In:  (Unable to assess)       Additional Information:  Writer received curbside consult from ED Brennon. Pt moved from [place] to [place] in March. Pt's daughter is overwhelmed and is struggling to make sure pt get the care he needs in the home. Pt is also having falls and seizures, likely relating to not taking his medications.    Writer met with pt's daughter. Writer introduced self, started to talk  "about role, where Rylee interrupted writer and stated that she has spoken to \"far too many social workers\" and that she has been \"disappointed\" by \"all of them.\" Rylee talked about how \"I have no expectations of you\" because she has been let down by so many other social workers. Writer offered to listen and provider support, but Rylee express reluctant to go over pt's story and history. Based on Rylee's frustration, and current experience, writer does not feel that it is appropriate to push for background information at this time. Pt was recently assessed 04/09/2024 by Cesario. See her CMA for background information.    Writer offered to Rylee if she could ask writer what she wanted assistance with, writer could answer if he would be able to provide assistance. Rylee expressed that \"I only want [Medical assistance application] faxed.\" Writer offered to provide further assistance after faxing to the application to the fax number she provided, and Rylee declined.      Writer notes that Rylee was very fixated how the SW who faxed the MA application to the county, \"did it wrong,\" however, from Nandini's note on 4/10, it appears that she instructed Rylee to call the [facility] office on Monday to ensure it was received and processed. Rylee reports she only checked on the MA status today, and found out it was never received.    Rylee is very frustrated what she sees the \"failures\" of the social workers, though this appears to be more attributable to lack of understanding of what hospital social workers can and cannot assist with. Writer attempted to provide education on role, but Rylee was dismissive and did not want writer to explain. At this point in time, writer will defer to IP SW to follow up with Rylee tomorrow.    Social work will continue to follow and provide assistance to ensure a safe and timely discharge.     ________________    PAULINO Travis, [facility]  ED/Observation   [facility]  Phone: [phone]  Pager: [phone]  Fax: [phone]    On-call pager, [phone], 4:00pm to midnight     After hours [email or web address] [place] and After Hours [email or web address]

## 2024-04-24 NOTE — ED PROVIDER NOTES
ED Provider Note  [facility]      History     Chief Complaint   Patient presents with    Seizures     HPI  Trevor Larios is a 73 year old male with history of seizure disorder with breakthrough seizures, mild to moderate cognitive impairment, AAA, C. difficile, failure to thrive, frequent falls, recent admission earlier this month who presents from home for evaluation after an unwitnessed fall, possible seizure and cuts on his forehead and finger.  Today he had an unwitnessed fall outside of his apartment building.  He was on the ground.  EMS was called, evaluated patient and contacted daughter.  Daughter brought patient to the ER and states that he lives alone and had not taken his seizure medications in a few days.  History mainly obtained through patient's daughter and son-in-law present at bedside.Daughter states that at baseline he is oriented but today seems more confused, he is typically confused after a seizure and she suspects that he had a seizure today.  No recent Tdap in our system or through [facility].  He had lived out of [facility] and moved to [place] this year to be closer to his daughter as he has been ailing.  Per daughter patient had previously been living with her sister in [place], but sister had recently been incarcerated and was no longer able to provide care for father.  Patient was subsequently medevaced to [facility] in [place], where was discovered that patient had an abdominal aortic aneurysm.  At that time patient's weight had decreased significantly, but since this time he has been able to gain back weight.  Per patient's daughter patient lives at home and gets nursing assistance 1 time a week with PT, OT the rest of the week.  Per daughter after reviewing his medication box, patient had not been taking any of his medications.  Unclear how long patient has not been taking his medication, but more than likely greater than 2 days.  Patient's  "daughter states that she has been trying to get help to provide father with more assistance as he has difficulty with taking medications and has had frequent hospitalizations within the past month regarding breakthrough seizures due to not taking medications.  Patient was seen in the emergency department on 4/17/2024 where he was diagnosed with C. difficile and was subsequently started on vancomycin.  Patient states that he has some left hip pain.  He states that he fell out of bed and is unclear on how he ended up outside of his home.  Patient has 2 lacerations on his forehead and on his left index finger.  Per daughter patient has scheduled CT for AAA scheduled at 3:00 at the [facility] and is concerned about patient receiving this CT as it is a specialized CT that he needs in order to evaluate his AAA.          A medically appropriate review of systems was performed with pertinent positives and negatives noted in the HPI, and all other systems negative.    Physical Exam   BP: 102/59  Pulse: 90  Temp: 98  F (36.7  C)  Resp: 18  Height: 175.3 cm (5' 9\")  Weight: 57.9 kg (127 lb 11.2 oz)  SpO2: 94 %    Wt Readings from Last 10 Encounters:   05/10/24 59.4 kg (131 lb)   04/25/24 57.9 kg (127 lb 11.2 oz)   04/18/24 59.4 kg (131 lb)   04/17/24 59.4 kg (131 lb)   04/11/24 58.5 kg (128 lb 15.5 oz)   03/15/24 54.7 kg (120 lb 8 oz)       Physical Exam  Constitutional:       General: He is awake.   HENT:      Head: Normocephalic.      Comments: Laceration present on left eyebrow     Mouth/Throat:      Mouth: Mucous membranes are moist.      Pharynx: Oropharynx is clear.   Eyes:      Extraocular Movements: Extraocular movements intact.      Conjunctiva/sclera: Conjunctivae normal.   Cardiovascular:      Rate and Rhythm: Normal rate and regular rhythm.      Heart sounds: Normal heart sounds.   Pulmonary:      Effort: Pulmonary effort is normal.      Breath sounds: Normal breath sounds. No wheezing.   Abdominal:      General: Abdomen " is flat. Bowel sounds are normal.      Palpations: Abdomen is soft.      Tenderness: There is no abdominal tenderness. There is no guarding.   Neurological:      Mental Status: He is alert.      Cranial Nerves: Cranial nerves 2-12 are intact.      Comments: Cranial nerves II-XII           ED Course, Procedures, & Data     ED Course as of 05/31/24 1841 Wed Apr 24, 2024   1201 Comprehensive metabolic panel  In process   1301 Called Chemistry Lab regarding patient's CMP. MRN# [id number]. Spoke with Piter. It is running right now, was on analyzer at 1253.    1511 UA with Microscopic reflex to Culture  Called acute care lab and spoke with Patria to see if the UA has been received. No samples being held in pneumatic tube system. Patria will print out label and check urine testing area, will call back if unable to find samples.      [facility]    -Laceration Repair    Date/Time: 4/24/2024 1:00 PM    Performed by: Eliu Solares MD  Authorized by: Eliu Solares MD    Risks, benefits and alternatives discussed.      ANESTHESIA (see MAR for exact dosages):     Anesthesia method:  Local infiltration    Local anesthetic:  Lidocaine 1% w/o epi  LACERATION DETAILS     Location:  Finger    Finger location:  L index finger    REPAIR TYPE:     Repair type:  Simple    EXPLORATION:     Hemostasis achieved with:  Direct pressure    Wound exploration: wound explored through full range of motion and entire depth of wound probed and visualized      TREATMENT:     Amount of cleaning:  Standard    Irrigation solution:  Sterile water    Visualized foreign bodies/material removed: no      SKIN REPAIR     Repair method:  Sutures    Suture size:  4-0    Suture technique:  Simple interrupted    APPROXIMATION     Approximation:  Close    PROCEDURE  Describe Procedure: 10 sutures applied to the R. Index finger. Close wound approximation. No overt bleeding               Results for orders  placed or performed during the hospital encounter of 04/24/24   CT Head w/o Contrast     Status: None    Narrative    EXAM: CT HEAD W/O CONTRAST  4/24/2024 1:54 PM     HISTORY: Pt with trauma to the head from mechanical fall       COMPARISON: 4/8/2024 MRI    TECHNIQUE: Using multidetector thin collimation helical acquisition  technique, axial, coronal and sagittal CT images from the skull base  to the vertex were obtained without intravenous contrast.   (topogram) image(s) also obtained and reviewed.    FINDINGS:  No acute intracranial hemorrhage, mass effect, or midline shift. No  acute loss of gray-white matter differentiation in the cerebral  hemispheres. Moderate leukoaraiosis and generalized cerebral volume  loss. Ventricles are proportionate to the cerebral sulci. Clear basal  cisterns.    The bony calvaria and the bones of the skull base are normal.  Comminuted nasal bone fracture which was present on 2/8/2024 CT.  Minimal residual mucosal thickening within the right maxillary sinus,  otherwise clear paranasal sinuses and mastoid air cells.. Grossly  normal orbits.       Impression    IMPRESSION: No acute intracranial pathology. Comminuted nasal bone  fracture which was present in February 2024. Stable chronic and  senescent findings.    I have personally reviewed the examination and initial interpretation  and I agree with the findings.    TREASURE BOURNE MD         SYSTEM ID:  [id number]   XR Pelvis 1/2 Views     Status: None    Narrative    EXAM: XR PELVIS 1/2 VIEWS  4/24/2024 12:27 PM      HISTORY: Pt with mechanical fall right sided hip pain    COMPARISON: None    FINDINGS: AP view of the pelvis.    Suboptimal profiling of the femoral neck with patient external  rotation. Partially visualized left femoral internal fixation with  cephalomedullary construct and healing intertrochanteric fracture no  evidence of hardware complication. Lumbar spondylosis. Mild  degenerative changes of the hips.       Impression    IMPRESSION:   1. The right femoral neck is poorly profiled due to positioning.  Further imaging with either repeat radiographs or CT could be  considered. Fracture not excluded on this study.  2. Healing left femoral intertrochanteric fracture status post ORIF.    ASHWIN WALTON MD (Joe)         SYSTEM ID:  [id number]   CTA Chest Abdomen with Contrast     Status: None    Narrative    CTA CHEST, ABDOMEN, AND PELVIS  4/24/2024 2:13 PM    CLINICAL HISTORY: Known hx of aortic aneurysm with possible  pseudoaneurysm.     COMPARISONS: [facility] CT 2/11/2024    REFERRING PROVIDER: JEFFREY VILLARREAL    TECHNIQUE: Unenhanced CT chest, abdomen, and pelvis. After the  uneventful administration of intravenous contrast, EKG gated CTA  chest, abdomen and pelvis. Coronal and sagittal reconstructions were  produced. Lung MIP produced.    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 Configuration suggests pseudoaneurysm more than a true  aneurysm but diagnosis can only be confirmed by histologic evaluation  of the aortic wall. Celiac artery exits the pseudoaneurysm.    Right innominate and left carotid arteries share a common origin.  Right innominate, subclavian, and carotid, left carotid, and left  subclavian arteries patent. Thin left vertebral artery. Right  vertebral artery patent.    Non specific fluid in the superior aortic recess.    Superior mesenteric artery patent. Bilateral single renal artery less  than 50% diameter stenosis by calcified plaques. Inferior mesenteric  artery patent.    Right common iliac artery measures 15 mm in diameter. Bilateral  common, internal, and external iliac arteries patent. Bilateral corona  mortis. Bilateral common femoral arteries patent.    Aortic measurements:       Sinuses of Valsalva: 40 mm        Sinotubular junction: 33 mm       Ascendin mm       Arch: 28 mm       Proximal descendin mm       Mid descendin mm       Diaphragm: 26 mm       Infrarenal: 20 mm       Above bifurcation: 23 mm    CT: Emphysematous changes. Scars and honeycombing.     Coronary artery calcifications.    Left shoulder replacement.    Radiodense material was in the duodenum in the non contrast scan.    22 mm left renal cyst averages 15 Hounsfield units in the arterial  phase.    Left femoral monet and screw.    Spine degenerative changes.      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 Celiac artery exits the aneurysm.     ERIKA VELAZQUEZ MD         SYSTEM ID:  [id number]   [place] Draw     Status: None    Narrative    The following orders were created for panel order [place] Draw.  Procedure                               Abnormality         Status                     ---------                               -----------         ------                     Extra Blue Top Tube[[id number]]                              Final result               Extra Blue Top Tube[[id number]]                              Final result               Extra Red Top Tube[[id number]]                               Final result               Extra Green Top (Lithium...[[id number]]                      Final result               Extra Purple Top Tube[[id number]]                            Final result               Extra Green Top (Lithium...[[id number]]                      Final result                 Please view results for these tests on the individual orders.   Extra Blue Top Tube     Status: None   Result Value Ref Range    Hold Specimen JIC    Extra Blue Top Tube     Status: None   Result Value Ref Range    Hold Specimen JIC    Extra Red Top Tube     Status: None   Result Value Ref Range    Hold Specimen JIC    Extra Green Top (Lithium Heparin) Tube     Status: None   Result Value Ref Range     Hold Specimen JIC    Extra Purple Top Tube     Status: None   Result Value Ref Range    Hold Specimen JIC    Extra Green Top (Lithium Heparin) ON ICE     Status: None   Result Value Ref Range    Hold Specimen JIC    CK total     Status: Normal   Result Value Ref Range    CK 80 39 - 308 U/L   Keppra (Levetiracetam) Level     Status: Normal   Result Value Ref Range    Keppra (Levetiracetam) Level 18.7 10.0 - 40.0  g/mL   Comprehensive metabolic panel     Status: Normal   Result Value Ref Range    Sodium 136 135 - 145 mmol/L    Potassium 4.7 3.4 - 5.3 mmol/L    Carbon Dioxide (CO2) 24 22 - 29 mmol/L    Anion Gap 11 7 - 15 mmol/L    Urea Nitrogen 21.2 8.0 - 23.0 mg/dL    Creatinine 0.94 0.67 - 1.17 mg/dL    GFR Estimate 85 >60 mL/min/1.73m2    Calcium 9.3 8.8 - 10.2 mg/dL    Chloride 101 98 - 107 mmol/L    Glucose 96 70 - 99 mg/dL    Alkaline Phosphatase 84 40 - 150 U/L    AST 25 0 - 45 U/L    ALT 16 0 - 70 U/L    Protein Total 7.4 6.4 - 8.3 g/dL    Albumin 3.8 3.5 - 5.2 g/dL    Bilirubin Total 0.4 <=1.2 mg/dL   CBC with platelets and differential     Status: Abnormal   Result Value Ref Range    WBC Count 8.7 4.0 - 11.0 10e3/uL    RBC Count 2.72 (L) 4.40 - 5.90 10e6/uL    Hemoglobin 9.2 (L) 13.3 - 17.7 g/dL    Hematocrit 29.1 (L) 40.0 - 53.0 %     (H) 78 - 100 fL    MCH 33.8 (H) 26.5 - 33.0 pg    MCHC 31.6 31.5 - 36.5 g/dL    RDW 15.1 (H) 10.0 - 15.0 %    Platelet Count 454 (H) 150 - 450 10e3/uL    % Neutrophils 68 %    % Lymphocytes 18 %    % Monocytes 10 %    % Eosinophils 1 %    % Basophils 1 %    % Immature Granulocytes 2 %    NRBCs per 100 WBC 0 <1 /100    Absolute Neutrophils 6.1 1.6 - 8.3 10e3/uL    Absolute Lymphocytes 1.5 0.8 - 5.3 10e3/uL    Absolute Monocytes 0.8 0.0 - 1.3 10e3/uL    Absolute Eosinophils 0.1 0.0 - 0.7 10e3/uL    Absolute Basophils 0.0 0.0 - 0.2 10e3/uL    Absolute Immature Granulocytes 0.2 <=0.4 10e3/uL    Absolute NRBCs 0.0 10e3/uL   UA with Microscopic reflex to Culture     Status:  Abnormal    Specimen: Urine, Clean Catch   Result Value Ref Range    Color Urine Yellow Colorless, Straw, Light Yellow, Yellow    Appearance Urine Clear Clear    Glucose Urine Negative Negative mg/dL    Bilirubin Urine Negative Negative    Ketones Urine Negative Negative mg/dL    Specific Gravity Urine 1.025 1.003 - 1.035    Blood Urine Negative Negative    pH Urine 7.0 5.0 - 7.0    Protein Albumin Urine 30 (A) Negative mg/dL    Urobilinogen Urine Normal Normal, 2.0 mg/dL    Nitrite Urine Negative Negative    Leukocyte Esterase Urine Negative Negative    Mucus Urine Present (A) None Seen /LPF    RBC Urine 2 <=2 /HPF    WBC Urine 5 <=5 /HPF    Squamous Epithelials Urine 1 <=1 /HPF    Hyaline Casts Urine 15 (H) <=2 /LPF    Narrative    Urine Culture not indicated   UA with Microscopic reflex to Culture     Status: Normal    Specimen: Urine, Midstream   Result Value Ref Range    Color Urine Light Yellow Colorless, Straw, Light Yellow, Yellow    Appearance Urine Clear Clear    Glucose Urine Negative Negative mg/dL    Bilirubin Urine Negative Negative    Ketones Urine Negative Negative mg/dL    Specific Gravity Urine 1.010 1.003 - 1.035    Blood Urine Negative Negative    pH Urine 6.5 5.0 - 7.0    Protein Albumin Urine Negative Negative mg/dL    Urobilinogen Urine Normal Normal, 2.0 mg/dL    Nitrite Urine Negative Negative    Leukocyte Esterase Urine Negative Negative    RBC Urine 1 <=2 /HPF    WBC Urine 2 <=5 /HPF    Squamous Epithelials Urine <1 <=1 /HPF    Narrative    Urine Culture not indicated   Troponin T, High Sensitivity     Status: Abnormal   Result Value Ref Range    Troponin T, High Sensitivity 35 (H) <=22 ng/L   Troponin T, High Sensitivity     Status: Abnormal   Result Value Ref Range    Troponin T, High Sensitivity 34 (H) <=22 ng/L   Comprehensive metabolic panel     Status: Normal   Result Value Ref Range    Sodium 140 135 - 145 mmol/L    Potassium 4.4 3.4 - 5.3 mmol/L    Carbon Dioxide (CO2) 25 22 - 29  mmol/L    Anion Gap 10 7 - 15 mmol/L    Urea Nitrogen 22.2 8.0 - 23.0 mg/dL    Creatinine 0.92 0.67 - 1.17 mg/dL    GFR Estimate 87 >60 mL/min/1.73m2    Calcium 9.4 8.8 - 10.2 mg/dL    Chloride 105 98 - 107 mmol/L    Glucose 95 70 - 99 mg/dL    Alkaline Phosphatase 79 40 - 150 U/L    AST 24 0 - 45 U/L    ALT 19 0 - 70 U/L    Protein Total 7.8 6.4 - 8.3 g/dL    Albumin 3.8 3.5 - 5.2 g/dL    Bilirubin Total 0.4 <=1.2 mg/dL   CBC with platelets     Status: Abnormal   Result Value Ref Range    WBC Count 5.2 4.0 - 11.0 10e3/uL    RBC Count 3.05 (L) 4.40 - 5.90 10e6/uL    Hemoglobin 10.3 (L) 13.3 - 17.7 g/dL    Hematocrit 32.2 (L) 40.0 - 53.0 %     (H) 78 - 100 fL    MCH 33.8 (H) 26.5 - 33.0 pg    MCHC 32.0 31.5 - 36.5 g/dL    RDW 15.3 (H) 10.0 - 15.0 %    Platelet Count 510 (H) 150 - 450 10e3/uL   EKG 12-lead, tracing only     Status: None   Result Value Ref Range    Systolic Blood Pressure  mmHg    Diastolic Blood Pressure  mmHg    Ventricular Rate 71 BPM    Atrial Rate 71 BPM    CA Interval 178 ms    QRS Duration 84 ms     ms    QTc 478 ms    P Axis 63 degrees    R AXIS -13 degrees    T Axis 42 degrees    Interpretation ECG       Sinus rhythm with Premature atrial complexes with Aberrant conduction  Otherwise normal ECG    Unconfirmed report - interpretation of this ECG is computer generated - see medical record for final interpretation  Confirmed by - EMERGENCY ROOM, PHYSICIAN ([id number]),  KATHY GARCIA ([id number]) on 4/24/2024 7:39:45 PM     CBC with platelets differential     Status: Abnormal    Narrative    The following orders were created for panel order CBC with platelets differential.  Procedure                               Abnormality         Status                     ---------                               -----------         ------                     CBC with platelets and d...[[id number]]  Abnormal            Final result                 Please view results for these tests on the  individual orders.     Medications   levETIRAcetam (KEPPRA) intermittent infusion 1,000 mg (0 mg Intravenous Stopped 4/24/24 1317)   Tdap (tetanus-diphtheria-acell pertussis) (ADACEL) injection 0.5 mL (0.5 mLs Intramuscular $Given 4/24/24 1311)   iopamidol (ISOVUE-370) solution 90 mL (90 mLs Intravenous $Given 4/24/24 1336)   sodium chloride (PF) 0.9% PF flush 90 mL (90 mLs Intravenous $Given 4/24/24 1336)   acetaminophen (TYLENOL) tablet 975 mg (975 mg Oral $Given 4/24/24 1504)     Labs Ordered and Resulted from Time of ED Arrival to Time of ED Departure   CBC WITH PLATELETS AND DIFFERENTIAL - Abnormal       Result Value    WBC Count 8.7      RBC Count 2.72 (*)     Hemoglobin 9.2 (*)     Hematocrit 29.1 (*)      (*)     MCH 33.8 (*)     MCHC 31.6      RDW 15.1 (*)     Platelet Count 454 (*)     % Neutrophils 68      % Lymphocytes 18      % Monocytes 10      % Eosinophils 1      % Basophils 1      % Immature Granulocytes 2      NRBCs per 100 WBC 0      Absolute Neutrophils 6.1      Absolute Lymphocytes 1.5      Absolute Monocytes 0.8      Absolute Eosinophils 0.1      Absolute Basophils 0.0      Absolute Immature Granulocytes 0.2      Absolute NRBCs 0.0     ROUTINE UA WITH MICROSCOPIC REFLEX TO CULTURE - Abnormal    Color Urine Yellow      Appearance Urine Clear      Glucose Urine Negative      Bilirubin Urine Negative      Ketones Urine Negative      Specific Gravity Urine 1.025      Blood Urine Negative      pH Urine 7.0      Protein Albumin Urine 30 (*)     Urobilinogen Urine Normal      Nitrite Urine Negative      Leukocyte Esterase Urine Negative      Mucus Urine Present (*)     RBC Urine 2      WBC Urine 5      Squamous Epithelials Urine 1      Hyaline Casts Urine 15 (*)    TROPONIN T, HIGH SENSITIVITY - Abnormal    Troponin T, High Sensitivity 35 (*)    CK TOTAL - Normal    CK 80     KEPPRA (LEVETIRACETAM) LEVEL - Normal    Keppra (Levetiracetam) Level 18.7     COMPREHENSIVE METABOLIC PANEL - Normal     Sodium 136      Potassium 4.7      Carbon Dioxide (CO2) 24      Anion Gap 11      Urea Nitrogen 21.2      Creatinine 0.94      GFR Estimate 85      Calcium 9.3      Chloride 101      Glucose 96      Alkaline Phosphatase 84      AST 25      ALT 16      Protein Total 7.4      Albumin 3.8      Bilirubin Total 0.4     ROUTINE UA WITH MICROSCOPIC REFLEX TO CULTURE - Normal    Color Urine Light Yellow      Appearance Urine Clear      Glucose Urine Negative      Bilirubin Urine Negative      Ketones Urine Negative      Specific Gravity Urine 1.010      Blood Urine Negative      pH Urine 6.5      Protein Albumin Urine Negative      Urobilinogen Urine Normal      Nitrite Urine Negative      Leukocyte Esterase Urine Negative      RBC Urine 1      WBC Urine 2      Squamous Epithelials Urine <1       CTA Chest Abdomen with Contrast   Final Result   IMPRESSION: 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 Celiac artery exits the aneurysm.       ERIKA VELAZQUEZ MD            SYSTEM ID:  [id number]      CT Head w/o Contrast   Final Result   IMPRESSION: No acute intracranial pathology. Comminuted nasal bone   fracture which was present in February 2024. Stable chronic and   senescent findings.      I have personally reviewed the examination and initial interpretation   and I agree with the findings.      TREASURE BOURNE MD            SYSTEM ID:  [id number]      XR Pelvis 1/2 Views   Final Result   IMPRESSION:    1. The right femoral neck is poorly profiled due to positioning.   Further imaging with either repeat radiographs or CT could be   considered. Fracture not excluded on this study.   2. Healing left femoral intertrochanteric fracture status post ORIF.      ASHWIN WALTON MD (Joe)            SYSTEM ID:  [id number]               Assessment & Plan    Trevor Larios is a 73 year old male with history of seizure disorder with breakthrough seizures, mild to moderate cognitive impairment, C.  difficile, failure to thrive, frequent falls, recent admission earlier this month who presents from home for evaluation after an unwitnessed fall, possible seizure and cuts on his forehead and finger.  Patient presents to the ED with confusion after an unwitnessed fall and trauma to the head and left index finger.  Given patient's presentation and history it is likely patient experienced a seizure 2/2 not taking any medication vs less likely acs vs less likely infection vs trauma vs syncope. ECG was normal sinus rhythm and unremarkable.  At this time we will check CT head without contrast, pelvis x-ray, CTA chest abdomen with contrast (after discussion with radiology to ensure correct test).  We will check labs: CBC with differential, CK, CMP, Keppra level, UA.  In the interim we will load patient with Keppra 2/2 concern patient has not been taking his medications.  Will load with 1000 mg of Keppra.  We will continue patient's vancomycin at this time.  Patient's laceration on the left index and forehead were irrigated and the left index was sutured with the forehead closed with skin glue.  Dressings applied and antibacterial ointment applied to laceration sites.  Patient was provided with Tdap injection due to unclear vaccination history.  Head CT shows no acute intracranial pathology at this time, Hip x-ray unable to r/o r. Hip fracture, but low concern for R. Hip fracture at this time given reassuring physical exam and weight bearing.   At this time patient is appropriate for admission due to failure to thrive at home and requiring multiple admissions within the past month for being unable to take medications for his seizures.  Patient will be admitted to medicine for observation at this time.Social work consulted given patient's admissions and concerns for readmission.        I have reviewed the nursing notes. I have reviewed the findings, diagnosis, plan and need for follow up with the patient.  --    ED  Attending Physician Attestation    I Jorge Clark MD, cared for this patient with the Resident. I have performed a history and physical examination of the patient and discussed management with the resident. I reviewed the resident's documentation above and agree with the documented findings and plan of care.    Summary of HPI, PE, ED Course   Patient is a 73 year old male evaluated in the emergency department for unwitnessed fall outside his apartment building question of seizure.. Exam and ED course notable for no seizure activity, 2 forehead lacerations and a finger laceration. After the completion of care in the emergency department, the patient was admitted to inpatient.      Jorge Clark MD  Emergency Medicine     Discharge Medication List as of 4/25/2024 12:45 PM          Final diagnoses:   Failure to thrive in adult   Falls frequently   Post-ictal confusion   Laceration of forehead, initial encounter   Laceration of left index finger without foreign body without damage to nail, initial encounter   Noncompliance with medication regimen       Jorge Clark MD  [facility] EMERGENCY DEPARTMENT  4/24/2024     Jorge Clark MD  05/31/24 1840

## 2024-04-24 NOTE — ED TRIAGE NOTES
Pt ambulatory from home after an unwitnessed fall. Per pt's daughter, pt was found outside his apartment building on the ground, EMS was called and evaluated pt. EMS called daughter and daughter brought pt to ED. Hx of seizures. Per pt's daughter, pt lives alone and has not taken his seizures meds in a few days. In triage, pt A&O X2. Pt usually oriented X4 per daughter. States this is what he is like after a seizure. Pt has laceration on forehead and finger.     Triage Assessment (Adult)       Row Name 04/24/24 1053          Triage Assessment    Airway WDL WDL        Respiratory WDL    Respiratory WDL WDL        Cardiac WDL    Cardiac WDL WDL        Peripheral/Neurovascular WDL    Peripheral Neurovascular WDL WDL        Cognitive/Neuro/Behavioral WDL    Cognitive/Neuro/Behavioral WDL WDL

## 2024-04-24 NOTE — H&P
[facility]    History and Physical - Hospitalist Service, GOLD TEAM        Date of Admission:  4/24/2024    Assessment & Plan      Trevor Larios is a 73 year old male w/ PMH significant for seizure disorder w/ breakthrough seizures, cognitive impairment, AAA, C difficile, FTT, frequent falls admitted on 4/24/2024 for observation after an unwitnessed fall suspicious for a seizure and for safe discharge planning.     #Failure to thrive  #Cognitive impairment  Recently moved from [place] to [place] to be closer to daughter who lives here so he can receive care as his other daughter in [place] can no longer care for him. Struggled w/ weight loss before moving to [place], but gained 11lb in March and staying stable. He lives independently in an apartment building, but daughter organizes his medications and thinks he needs more support, especially with his recent falls, breakthrough seizures, and medication non-adherence.   On exam, pt certainly appears thin, but is able to move well and readjust on his own. Based on the frequency of his falls and his difficulty in tracking his medications, he would likely benefit from higher level of care to ensure his safety.   Per Family Medicine note on 3/15/24, pt carries diagnosis of dementia and was referred to neurology. Has not seen them outpatient at this point. Per last discharge summary, pt MOCA 21/30 and living in independent senior living. Daughter is POA.   - PT/OT consults, appreciate input on dispo recs  - SW/CC consults placed for dispo planning  - Pt would like to decrease pill burden if able-- look to discontinue some vitamins on discharge?    #Recent fall, suspect caused by seizure  #Seizure disorder w/ breakthrough seizures in setting of missed medication doses  #L forehead laceration & L pointer finger laceration 2/2 fall  Dx'd w/ seizure disorder in Aug 2023, unknown etiology. Presenting after an unwitnessed fall and being found  outside his apartment building. Per daughter (who manages his medication box), pt had not taken anti-seizure meds for at least 2 days.   Based on daughter's report, pt had post ictal phase for several hours after his fall that was similar to post ictal phase after previous seizures. Patient's history is a bit inconsistent and daughter disagrees w/ his report of mechanical falls, so difficult to know the exact circumstances surrounding the fall. As the interview went on, he did admit that he didn't realize he hadn't been taking his seizure meds until recently. Therefore, I have a higher suspicion for a breakthrough seizure from missed medication dosing.   This admission, head CT w/o acute trauma or acute pathology. Stable and unchanged comminuted nasal bone fracture, present in Feb 2024. Labs w/ unremarkable CMP, stable macrocytic anemia. Spoke w/ Neurologist on call and discussed Keppra loading dose, need for EEG, and need for formal Neurology consult (see below). On exam, pt w/ normal neuro exam, equal strength BL. No bony tenderness over the vertebrae. No obvious skeletal deformity. Forehead and finger lacerations closed in the ED.  Medication non adherence and falls suspected 2/2 seizures have become a more frequent issue over the last several months. Pt hospitalized w/ similar presentation from 4/7/24 - 4/11/24, switched to Keppra (from zonisamide & depakote). MRI 4/8 w/o definite seizure nidus, moderate generalized volume loss and mild chronic ischemic disease, and likely acute R maxillary sinusitis. Neurology saw pt during last admission and recommended above med change and outpatient follow up, which has not been completed yet.  - Seizure precautions  - Continue PTA Keppra 750mg in AM and 1000mg in PM   -- Would be helpful if pt was in environment where his medication admin could be monitored so that it can be assessed if he has good control over his seizures w/ Keppra or needs additional/different meds  -  S/p 1g IV Keppra loading dose; per neuro, this should be sufficient given pt weight  - No need for EEG at this time as pt not post ictal and not actively seizing, plus has already been dx'd w/ seizure disorder  - No need for formal Neurology consult at this time, but reach out if any changes or questions  :: Outpatient Neurology follow up as recommended during last admission    #Hx of L shoulder fx s/p replacement  #L intertrochanteric fx s/p ORIF   Orthopedic hx is difficult to pin down as there is limited access to records from [place] and there is some conflicting documentation regarding laterality of fractures and if he also had surgery on his R shoulder at some point. No increase in shoulder or hip pain after recent fall per patient. Exam w/o tenderness or deformity over the upper extremities, able to do SLR w/ both legs. On admission, pelvic XR w/o good views of R hip, so fx not r/o per radiology read. L hip s/p ORIF healing.   - Tylenol PRN for pain  - Can trial lidocaine patch if desired by patient  - Consider repeat imaging (XR or CT) for further eval    #C diff colitis  Diagnosed in the ED on 4/18/2024. Had issues w/ insurance getting fidaxomicin.  - Continue vancomycin 125mg QID for 8 days  - Enteric precautions    #Hypotension  Required pressors during recent admission in [place], etiology unclear. Started on midodrine and continues to take it. He was previously on prednisone but it is unclear for how long and at what dose, so wondering if hypotension is related to tapering steroids off too quickly. He did have a low AM cortisol of 2.7 but a normal cosyntropin test. Per last admission discharge summary, thought that malnutrition and hydration also playing a role.   BP stable and normotensive thus far. No concerning symptoms of hypotension reported.   - Continue midodrine 10mg TID    #Abdominal aortic aneurysm  #CAD  #PVCs  #Prolonged QTc  #Elevated troponin (35)  AAA noted on CT in Feb 2024 in [place].  Advised to follow up w/ vascular surgery, which he has scheduled here in [place]. 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   EKG on admit w/ QTc of 478 and w/ several PVCs on my read, which have been noted previously. Troponin is elevated to 35, previous hospitalization w/ troponins around 150 and stable, attributed to demand ischemia. Pt seen by Cardiology last admission who recommended outpatient ishcemic workup and placed referral. No chest pain, no lightheadedness, no presyncopal symptoms prior to fall today or at any point.   - Appointment w/ Vascular Surgery scheduled for 4/29/24, may need to reschedule depending on hospital course  - Follow up w/ Cardiology outpatient as recommended last admission  - Repeat troponin for trend  - Caution with QTc prolonging medications  - PTA aspirin 81mg daily    #Macrocytic anemia  #B12 deficiency  Hgb 9.2 on admission, stable compared to prior. Recent baseline between 9-10. Pt on PTA b12 supplementation.  - Continue PTA B12  - CTM    #Crohn's disease  Referred to GI by PCP.        Observation Goals: -diagnostic tests and consults completed and resulted, -vital signs normal or at patient baseline, -safe disposition plan has been identified, Nurse to notify provider when observation goals have been met and patient is ready for discharge.  Diet: Regular Diet Adult  DVT Prophylaxis: Pneumatic Compression Devices  Sneed Catheter: Not present  Lines: None     Cardiac Monitoring: None  Code Status: Full Code    Clinically Significant Risk Factors Present on Admission                # Drug Induced Platelet Defect: home medication list includes an antiplatelet medication            # Financial/Environmental Concerns: other (see comments) (Unable to assess)         Disposition Plan     Medically Ready for Discharge: Anticipated in 2-4 Days         The patient's care was discussed with the Attending  Physician, Dr. Fernandes .    Andrea Hughes PA-C  Hospitalist Service, [facility]  Securely message with Vysr (more info)  Text page via goBramble Paging/Directory   See signed in provider for up to date coverage information    ______________________________________________________________________    Chief Complaint   Falls this morning    History is obtained from the patient, electronic health record, and patient's daughter    History of Present Illness   Trevor Larios is a 73 year old male who has PMH significant for seizure disorder w/ breakthrough seizures, cognitive impairment, AAA, C difficile, FTT, frequent falls. He presented to the ED via ambulance after a bystander saw him fall or found him on the ground. Briefly, the patient has been seen multiple times in the past month for various falls and injuries, seemingly related to him not taking his anti seizure medications regularly.     The patient reports waking up at 0300 and getting up to get something. He fell in his apartment and cut his head and his L pointer finger. He says he was then walking to the store to get bandaids later in the morning and fell off of the curb which is where a person found him and called 911. He did not want to come to the hospital and was annoyed that 911 was called. He denies chest pain, sob, lightheadedness, vision changes, seizure activity before the fall. States it was just a mistake. Denies biting his tongue. Denies increased pain in his hip or shoulder after the fall, states they hurt all the time. Tylenol and advil help with the pain. He feels he can't get comfortable in the bed he has in the ED which is annoying to him.     His daughter, Rylee, was in the room for part of the interview. She was sitting behind the patient and vehemently shaking her head when he stated he fell on accident and that it wasn't a seizure. She states she noted he had not taken his anti  "seizure medications for at least 2 days and that he was behaving similar to how he was after his previous seizures. She feels he needs more support and care.     The patient is very unhappy that he is in the ED and needs to be admitted. He would prefer to go home. States he didn't get to eat all day and is tired of waiting for a room. His daughter clarified that he slept most of the day after he received the Keppra and just woke up so she brought him food. He kept saying that he was eating but he had \"no taste\". Did not elaborate on this.         Past Medical History    Past Medical History:   Diagnosis Date    Crohn's disease (H)     Dementia (H) 2023    \"moderate.\"       Past Surgical History   Past Surgical History:   Procedure Laterality Date    ORIF HIP FRACTURE  2022    SHOULDER SURGERY Right     2022?       Prior to Admission Medications   Prior to Admission Medications   Prescriptions Last Dose Informant Patient Reported? Taking?   Vitamin D, Cholecalciferol, 25 MCG (1000 UT) CAPS  Other No No   Sig: Take 1,000 mcg by mouth daily   aspirin (ASA) 81 MG chewable tablet   No No   Sig: Take 1 tablet (81 mg) by mouth every evening   cyanocobalamin (VITAMIN B-12) 500 MCG SUBL sublingual tablet   No No   Sig: Place 2 tablets (1,000 mcg) under the tongue daily   ferrous sulfate (FEROSUL) 325 (65 Fe) MG tablet   No No   Sig: Take 1 tablet (325 mg) by mouth daily   fidaxomicin (DIFICID) 200 MG tablet   No No   Sig: Take 1 tablet (200 mg) by mouth 2 times daily   levETIRAcetam (KEPPRA) 1000 MG tablet   No No   Sig: Take 1 tablet (1,000 mg) by mouth every evening   levETIRAcetam (KEPPRA) 750 MG tablet   No No   Sig: Take 1 tablet (750 mg) by mouth every morning   midodrine (PROAMATINE) 5 MG tablet   No No   Sig: Take 2 tablets (10 mg) by mouth 3 times daily   multivitamin w/minerals (THERA-VIT-M) tablet   No No   Sig: Take 1 tablet by mouth daily   thiamine (B-1) 100 MG tablet  Other No No   Sig: Take 1 tablet (100 mg) " by mouth daily   vancomycin (VANCOCIN) 125 MG capsule   No No   Sig: Take 1 capsule (125 mg) by mouth 4 times daily for 10 days   vitamin C (ASCORBIC ACID) 500 MG tablet  Other No No   Sig: Take 1 tablet (500 mg) by mouth daily      Facility-Administered Medications: None        Review of Systems    The 10 point Review of Systems is negative other than noted in the HPI or here.     Allergies   No Known Allergies  ------------------------------------------------------------------------     Physical Exam   Vital Signs: Temp: 98  F (36.7  C) Temp src: Tympanic BP: 118/66 Pulse: 76   Resp: 18 SpO2: 100 % O2 Device: None (Room air)    Weight: 0 lbs 0 oz    General: Pt sitting up at foot of bed eating dinner. Alerted to clinician entrance.   HEENT: Normocephalic. Has hat covering his forehead wound. No rhinorrhea or throat clearing.   Respiratory: Lungs CTAB, no respiratory distress.   Cardiovascular: RRR, no murmur noted on today's exam.   GI:Abdomen is non distended, non tender. No guarding, no rigidity.   MSK: No bony tenderness over vertebrae. Not deformity of BL upper extremity on gross examination or w/ palpation. No tenderness to palpation. Able to do SLR on both sides.   Neuro: Alert and oriented.  strength present and equal BL. CN II-XII intact. No focal deficits  noted. Dorsal and plantar flexion, knee flexion and extension intact and equal BL.    Skin: Warm and dry.   Psych: Frustrated. Answered questions but was clear he wanted to go home. Cooperative overall.       Medical Decision Making       75 MINUTES SPENT BY ME on the date of service doing chart review, history, exam, documentation & further activities per the note.      Data   ------------------------- PAST 24 HR DATA REVIEWED -----------------------------------------------    I have personally reviewed the following data over the past 24 hrs:    8.7  \   9.2 (L)   / 454 (H)     136 101 21.2 /  96   4.7 24 0.94 \     ALT: 16 AST: 25 AP: 84 TBILI:  0.4   ALB: 3.8 TOT PROTEIN: 7.4 LIPASE: N/A     Trop: 35 (H) BNP: N/A       Imaging results reviewed over the past 24 hrs:   Recent Results (from the past 24 hour(s))   XR Pelvis 1/2 Views    Narrative    EXAM: XR PELVIS 1/2 VIEWS  4/24/2024 12:27 PM      HISTORY: Pt with mechanical fall right sided hip pain    COMPARISON: None    FINDINGS: AP view of the pelvis.    Suboptimal profiling of the femoral neck with patient external  rotation. Partially visualized left femoral internal fixation with  cephalomedullary construct and healing intertrochanteric fracture no  evidence of hardware complication. Lumbar spondylosis. Mild  degenerative changes of the hips.      Impression    IMPRESSION:   1. The right femoral neck is poorly profiled due to positioning.  Further imaging with either repeat radiographs or CT could be  considered. Fracture not excluded on this study.  2. Healing left femoral intertrochanteric fracture status post ORIF.    ASHWIN WALTON MD (Joe)         SYSTEM ID:  [id number]   CT Head w/o Contrast    Narrative    EXAM: CT HEAD W/O CONTRAST  4/24/2024 1:54 PM     HISTORY: Pt with trauma to the head from mechanical fall       COMPARISON: 4/8/2024 MRI    TECHNIQUE: Using multidetector thin collimation helical acquisition  technique, axial, coronal and sagittal CT images from the skull base  to the vertex were obtained without intravenous contrast.   (topogram) image(s) also obtained and reviewed.    FINDINGS:  No acute intracranial hemorrhage, mass effect, or midline shift. No  acute loss of gray-white matter differentiation in the cerebral  hemispheres. Moderate leukoaraiosis and generalized cerebral volume  loss. Ventricles are proportionate to the cerebral sulci. Clear basal  cisterns.    The bony calvaria and the bones of the skull base are normal.  Comminuted nasal bone fracture which was present on 2/8/2024 CT.  Minimal residual mucosal thickening within the right maxillary  sinus,  otherwise clear paranasal sinuses and mastoid air cells.. Grossly  normal orbits.       Impression    IMPRESSION: No acute intracranial pathology. Comminuted nasal bone  fracture which was present in February 2024. Stable chronic and  senescent findings.    I have personally reviewed the examination and initial interpretation  and I agree with the findings.    TREASURE BOURNE MD         SYSTEM ID:  [id number]   CTA Chest Abdomen with Contrast    Narrative    CTA CHEST, ABDOMEN, AND PELVIS  4/24/2024 2:13 PM    CLINICAL HISTORY: Known hx of aortic aneurysm with possible  pseudoaneurysm.     COMPARISONS: [facility] [place] [facility] CT 2/11/2024    REFERRING PROVIDER: JEFFREY VILLARREAL    TECHNIQUE: Unenhanced CT chest, abdomen, and pelvis. After the  uneventful administration of intravenous contrast, EKG gated CTA  chest, abdomen and pelvis. Coronal and sagittal reconstructions were  produced. Lung MIP produced.    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 Configuration suggests pseudoaneurysm more than a true  aneurysm but diagnosis can only be confirmed by histologic evaluation  of the aortic wall. Celiac artery exits the pseudoaneurysm.    Right innominate and left carotid arteries share a common origin.  Right innominate, subclavian, and carotid, left carotid, and left  subclavian arteries patent. Thin left vertebral artery. Right  vertebral artery patent.    Non specific fluid in the superior aortic recess.    Superior mesenteric artery patent. Bilateral single renal artery less  than 50% diameter stenosis by calcified plaques. Inferior mesenteric  artery patent.    Right common iliac artery measures 15 mm in diameter. Bilateral  common, internal, and external iliac arteries patent. Bilateral corona  mortis. Bilateral common femoral arteries  patent.    Aortic measurements:       Sinuses of Valsalva: 40 mm       Sinotubular junction: 33 mm       Ascendin mm       Arch: 28 mm       Proximal descendin mm       Mid descendin mm       Diaphragm: 26 mm       Infrarenal: 20 mm       Above bifurcation: 23 mm    CT: Emphysematous changes. Scars and honeycombing.     Coronary artery calcifications.    Left shoulder replacement.    Radiodense material was in the duodenum in the non contrast scan.    22 mm left renal cyst averages 15 Hounsfield units in the arterial  phase.    Left femoral monet and screw.    Spine degenerative changes.      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 Celiac artery exits the aneurysm.     ERIKA VELAZQUEZ MD         SYSTEM ID:  [id number]

## 2024-04-25 ENCOUNTER — TELEPHONE (OUTPATIENT)
Dept: FAMILY MEDICINE | Facility: CLINIC | Age: 74
End: 2024-04-25

## 2024-04-25 VITALS
SYSTOLIC BLOOD PRESSURE: 103 MMHG | WEIGHT: 127.7 LBS | OXYGEN SATURATION: 94 % | HEIGHT: 69 IN | RESPIRATION RATE: 16 BRPM | BODY MASS INDEX: 18.91 KG/M2 | HEART RATE: 82 BPM | TEMPERATURE: 99.1 F | DIASTOLIC BLOOD PRESSURE: 74 MMHG

## 2024-04-25 LAB
ALBUMIN SERPL BCG-MCNC: 3.8 G/DL (ref 3.5–5.2)
ALP SERPL-CCNC: 79 U/L (ref 40–150)
ALT SERPL W P-5'-P-CCNC: 19 U/L (ref 0–70)
ANION GAP SERPL CALCULATED.3IONS-SCNC: 10 MMOL/L (ref 7–15)
AST SERPL W P-5'-P-CCNC: 24 U/L (ref 0–45)
BILIRUB SERPL-MCNC: 0.4 MG/DL
BUN SERPL-MCNC: 22.2 MG/DL (ref 8–23)
CALCIUM SERPL-MCNC: 9.4 MG/DL (ref 8.8–10.2)
CHLORIDE SERPL-SCNC: 105 MMOL/L (ref 98–107)
CREAT SERPL-MCNC: 0.92 MG/DL (ref 0.67–1.17)
DEPRECATED HCO3 PLAS-SCNC: 25 MMOL/L (ref 22–29)
EGFRCR SERPLBLD CKD-EPI 2021: 87 ML/MIN/1.73M2
ERYTHROCYTE [DISTWIDTH] IN BLOOD BY AUTOMATED COUNT: 15.3 % (ref 10–15)
GLUCOSE SERPL-MCNC: 95 MG/DL (ref 70–99)
HCT VFR BLD AUTO: 32.2 % (ref 40–53)
HGB BLD-MCNC: 10.3 G/DL (ref 13.3–17.7)
MCH RBC QN AUTO: 33.8 PG (ref 26.5–33)
MCHC RBC AUTO-ENTMCNC: 32 G/DL (ref 31.5–36.5)
MCV RBC AUTO: 106 FL (ref 78–100)
PLATELET # BLD AUTO: 510 10E3/UL (ref 150–450)
POTASSIUM SERPL-SCNC: 4.4 MMOL/L (ref 3.4–5.3)
PROT SERPL-MCNC: 7.8 G/DL (ref 6.4–8.3)
RBC # BLD AUTO: 3.05 10E6/UL (ref 4.4–5.9)
SODIUM SERPL-SCNC: 140 MMOL/L (ref 135–145)
WBC # BLD AUTO: 5.2 10E3/UL (ref 4–11)

## 2024-04-25 PROCEDURE — 250N000013 HC RX MED GY IP 250 OP 250 PS 637

## 2024-04-25 PROCEDURE — 80053 COMPREHEN METABOLIC PANEL: CPT

## 2024-04-25 PROCEDURE — 99239 HOSP IP/OBS DSCHRG MGMT >30: CPT | Mod: FS | Performed by: PHYSICIAN ASSISTANT

## 2024-04-25 PROCEDURE — 97161 PT EVAL LOW COMPLEX 20 MIN: CPT | Mod: GP

## 2024-04-25 PROCEDURE — 97116 GAIT TRAINING THERAPY: CPT | Mod: GP

## 2024-04-25 PROCEDURE — 97530 THERAPEUTIC ACTIVITIES: CPT | Mod: GP

## 2024-04-25 PROCEDURE — 999N000111 HC STATISTIC OT IP EVAL DEFER

## 2024-04-25 PROCEDURE — 250N000013 HC RX MED GY IP 250 OP 250 PS 637: Performed by: PHYSICIAN ASSISTANT

## 2024-04-25 PROCEDURE — 99207 PR APP CREDIT; MD BILLING SHARED VISIT: CPT | Performed by: INTERNAL MEDICINE

## 2024-04-25 PROCEDURE — 85027 COMPLETE CBC AUTOMATED: CPT

## 2024-04-25 PROCEDURE — G0378 HOSPITAL OBSERVATION PER HR: HCPCS

## 2024-04-25 PROCEDURE — 36415 COLL VENOUS BLD VENIPUNCTURE: CPT

## 2024-04-25 RX ORDER — VANCOMYCIN HYDROCHLORIDE 125 MG/1
125 CAPSULE ORAL 4 TIMES DAILY
Qty: 11 CAPSULE | Refills: 0 | Status: DISCONTINUED | OUTPATIENT
Start: 2024-04-25 | End: 2024-04-25 | Stop reason: HOSPADM

## 2024-04-25 RX ADMIN — LEVETIRACETAM 750 MG: 500 TABLET, FILM COATED ORAL at 09:18

## 2024-04-25 RX ADMIN — Medication 1000 MCG: at 09:10

## 2024-04-25 RX ADMIN — MIDODRINE HYDROCHLORIDE 10 MG: 5 TABLET ORAL at 09:11

## 2024-04-25 RX ADMIN — DICLOFENAC SODIUM 2 G: 10 GEL TOPICAL at 09:14

## 2024-04-25 RX ADMIN — FERROUS SULFATE TAB 325 MG (65 MG ELEMENTAL FE) 325 MG: 325 (65 FE) TAB at 09:12

## 2024-04-25 RX ADMIN — DICLOFENAC SODIUM 2 G: 10 GEL TOPICAL at 12:07

## 2024-04-25 RX ADMIN — VANCOMYCIN HYDROCHLORIDE 125 MG: 125 CAPSULE ORAL at 09:09

## 2024-04-25 RX ADMIN — VANCOMYCIN HYDROCHLORIDE 125 MG: 125 CAPSULE ORAL at 12:07

## 2024-04-25 RX ADMIN — ACETAMINOPHEN 975 MG: 325 TABLET, FILM COATED ORAL at 09:11

## 2024-04-25 ASSESSMENT — ACTIVITIES OF DAILY LIVING (ADL)
ADLS_ACUITY_SCORE: 42
ADLS_ACUITY_SCORE: 40
ADLS_ACUITY_SCORE: 42

## 2024-04-25 NOTE — PLAN OF CARE
Goal Outcome Evaluation:  diagnostic tests and consults completed and resulted-yes  -vital signs normal or at patient baseline-yes   -safe disposition plan has been identified-yes   Nurse to notify provider when observation goals have been met and patient is ready for discharge.Yes

## 2024-04-25 NOTE — PROGRESS NOTES
Care Management Follow Up        Length of Stay (days): 0    Expected Discharge Date:  TBD     Concerns to be Addressed:     DUBON Document  Patient plan of care discussed at interdisciplinary rounds: Yes    Anticipated Discharge Disposition:  TBD     Anticipated Discharge Services:    Anticipated Discharge DME:      Patient/family educated on Medicare website which has current facility and service quality ratings:  N/A  Education Provided on the Discharge Plan:  N/A  Patient/Family in Agreement with the Plan:  N/A    Referrals Placed by CM/SW:  Not at this time  Private pay costs discussed: insurance costs out of pocket expenses, co-pays and deductibles    Care Management Discharge Note    Discharge Date: 04/25/2024       Discharge Disposition:  (TBD)    Discharge Services: None    Discharge DME:  (Unable to assess)    Discharge Transportation:  (TBD)    Private pay costs discussed: Not applicable    Does the patient's insurance plan have a 3 day qualifying hospital stay waiver?  No    PAS Confirmation Code:    Patient/family educated on Medicare website which has current facility and service quality ratings: no    Education Provided on the Discharge Plan: No  Persons Notified of Discharge Plans: Family, pt  Patient/Family in Agreement with the Plan: unable to assess    Handoff Referral Completed: Yes    Additional Information:    1115 Due to Trevor's confusion, SW spoke with pt's Rylee Larios to review Medicare Outpatient Observation Notice (MOON) and why pt was receiving it. SW explained the document and addressed questions and concerns.    1222 Rylee signed DUBON document acknowledging its receipt. SW offered to make copy of signed document for pt records. Rylee declined offer.      Trevor expressed determined desire to discharge. BNONY and Rylee discussed this and Rylee reported that while she ultimately wanted an LTC placement for him, she felt that her family would be able to provide adequate support to keep him  "safe. \"We know we just have to be there with him every day\". She told BONNY that Trevor doesn't think he needs support and thinks he is fine living alone. SW explained that Trevor is likely experiencing grief due to his declining health. SW encouraged Rylee to return to the ED if Trevor continues to have issues. Rylee expressed understanding.    SW provided emotional support via active and reflective listening and responding to feelings; normalized and validated feelings and experiences; and provided a supportive, non-anxious, and non-judgmental presence. No additional questions or concerns were reported. SW provided availability and contact information and excused self from Trevor's bedside.    BONNY spoke with OBS SHAHRIAR Jones and provided update. PA placed home care resumption orders    1257 SW faxed DUBON to [facility] ([phone]) and placed DUBON in Trevor's chart.        BONNY will continue to follow as needed.    PAULINO Hernandez, [facility]  ED/OBS   [facility]  Phone: [phone]  Pager: [phone]  Fax: [phone]      [place] [employer]     After hours [employer] After Hours [employer] [place]     On-call pager, [phone], 4:00 pm to midnight          "

## 2024-04-25 NOTE — DISCHARGE SUMMARY
[facility]  Hospitalist Discharge Summary      Date of Admission:  4/24/2024  Date of Discharge:  4/25/2024  Discharging Provider: Karen Gambino PA-C  Discharge Service: Hospitalist Service    Discharge Diagnoses   #FTT  #Cognitive Impairment  #Recent fall, suspect caused by seizure  #Seizure disorder w/ breakthrough seizures in setting of missed medication doses  #L forehead laceration & L pointer finger laceration 2/2 fall  #Hx of L shoulder fx s/p replacement  #L intertrochanteric fx s/p ORIF  #C.diff colitis   #Hypotension, resolved   #Abdominal aortic aneurysm  #CAD  #PVCs  #Prolonged Qtc  #Macrocytic anemia  #B12 Deficiency   #Crohn's Disease           Clinically Significant Risk Factors          Follow-ups Needed After Discharge   Follow-up Appointments     Adult [facility] Follow-up and recommended labs and tests      Follow up with primary care provider, CATHY FLORES, within 7 days for   hospital follow- up.  No follow up labs or test are needed. They will help   coordinate ongoing long term placement options.       Appointments on [facility] and/or [facility] (with [facility] or [facility]   provider or service). Call [phone] if you haven't heard regarding   these appointments within 7 days of discharge.            Discharge Disposition   Discharged to home  Condition at discharge: Stable    Hospital Course   Trevor Larios is a 73 year old male w/ PMH significant for seizure disorder w/ breakthrough seizures, cognitive impairment, AAA, C difficile, FTT, frequent falls admitted on 4/24/2024 for observation after an unwitnessed fall suspicious for a seizure and for safe discharge planning. Patient received a loading dose of Keppra and resumption of his home antiepileptics as it was found per discussion with daughter that he missed several doses. He remained seizure free during his hospital stay.  He was able to work with PT. While initially patient was brought it  with hopes of finding long term placement, patient's daughter ultimately decided to take her father home (he was becoming agitated in the hospital, expressing desire to leave) with ongoing family support watching him until placement secured.  Patient's daughter is POA and patient was medically stable for discharge.     Consultations This Hospital Stay   CARE MANAGEMENT / SOCIAL WORK IP CONSULT  PHYSICAL THERAPY ADULT IP CONSULT  OCCUPATIONAL THERAPY ADULT IP CONSULT    Code Status   Full Code    Time Spent on this Encounter   I, Karen Gambino PA-C, personally saw the patient today and spent greater than 30 minutes discharging this patient.       Karen Gambino PA-C  [facility] [address] [address]  Phone: [phone]  Fax: [phone]  ______________________________________________________________________    Physical Exam   Vital Signs: Temp: 99.1  F (37.3  C) Temp src: Oral BP: 103/74 Pulse: 82   Resp: 16 SpO2: 94 % O2 Device: None (Room air)    Weight: 127 lbs 11.2 oz  GENERAL: Alert and oriented to person and place.  NAD. Ambulatory.   HEENT: Anicteric sclera. Mucous membranes moist. NC. AT.   CV: RRR. S1, S2. No murmurs appreciated.   RESPIRATORY: Effort normal on RA. Lungs CTAB with no wheezing, rales, rhonchi.   GI: Abdomen soft and non distended with normoactive bowel sounds present in all quadrants. No tenderness  NEUROLOGICAL: No focal deficits. Moves all extremities.  .  EXTREMITIES: No peripheral edema.   SKIN: No jaundice. No rashes.          Primary Care Physician   CATHY FLORES    Discharge Orders      Reason for your hospital stay    You were admitted following a ground level fall at home.  It is likely you had a seizure due to missing your antiepileptics and fell.  You were loaded with seizure medications, your Keppra was restarted and you were observed for any ongoing seizures.     Activity    Your activity upon discharge:  activity as tolerated     Resume Home Care Services     Adult [facility] Follow-up and recommended labs and tests    Follow up with primary care provider, CATHY FLORES, within 7 days for hospital follow- up.  No follow up labs or test are needed. They will help coordinate ongoing long term placement options.       Appointments on [place] and/or [facility] (with [facility] or [facility] provider or service). Call [phone] if you haven't heard regarding these appointments within 7 days of discharge.     Diet    Follow this diet upon discharge: regular diet       Significant Results and Procedures   Most Recent 3 CBC's:  Recent Labs   Lab Test 04/25/24  0612 04/24/24  1148 04/18/24  2354   WBC 5.2 8.7 10.0   HGB 10.3* 9.2* 9.6*   * 107* 106*   * 454* 448     Most Recent 3 BMP's:  Recent Labs   Lab Test 04/25/24  0612 04/24/24  1148 04/18/24  2354    136 134*   POTASSIUM 4.4 4.7 4.4   CHLORIDE 105 101 100   CO2 25 24 24   BUN 22.2 21.2 32.4*   CR 0.92 0.94 0.85   ANIONGAP 10 11 10   MALIHA 9.4 9.3 9.2   GLC 95 96 101*   ,   Results for orders placed or performed during the hospital encounter of 04/24/24   CT Head w/o Contrast    Narrative    EXAM: CT HEAD W/O CONTRAST  4/24/2024 1:54 PM     HISTORY: Pt with trauma to the head from mechanical fall       COMPARISON: 4/8/2024 MRI    TECHNIQUE: Using multidetector thin collimation helical acquisition  technique, axial, coronal and sagittal CT images from the skull base  to the vertex were obtained without intravenous contrast.   (topogram) image(s) also obtained and reviewed.    FINDINGS:  No acute intracranial hemorrhage, mass effect, or midline shift. No  acute loss of gray-white matter differentiation in the cerebral  hemispheres. Moderate leukoaraiosis and generalized cerebral volume  loss. Ventricles are proportionate to the cerebral sulci. Clear basal  cisterns.    The bony calvaria and the bones of the skull base are normal.  Comminuted nasal bone  fracture which was present on 2/8/2024 CT.  Minimal residual mucosal thickening within the right maxillary sinus,  otherwise clear paranasal sinuses and mastoid air cells.. Grossly  normal orbits.       Impression    IMPRESSION: No acute intracranial pathology. Comminuted nasal bone  fracture which was present in February 2024. Stable chronic and  senescent findings.    I have personally reviewed the examination and initial interpretation  and I agree with the findings.    TREASURE BOURNE MD         SYSTEM ID:  [id number]   XR Pelvis 1/2 Views    Narrative    EXAM: XR PELVIS 1/2 VIEWS  4/24/2024 12:27 PM      HISTORY: Pt with mechanical fall right sided hip pain    COMPARISON: None    FINDINGS: AP view of the pelvis.    Suboptimal profiling of the femoral neck with patient external  rotation. Partially visualized left femoral internal fixation with  cephalomedullary construct and healing intertrochanteric fracture no  evidence of hardware complication. Lumbar spondylosis. Mild  degenerative changes of the hips.      Impression    IMPRESSION:   1. The right femoral neck is poorly profiled due to positioning.  Further imaging with either repeat radiographs or CT could be  considered. Fracture not excluded on this study.  2. Healing left femoral intertrochanteric fracture status post ORIF.    ASHWIN WALTON MD (Joe)         SYSTEM ID:  [id number]   CTA Chest Abdomen with Contrast    Narrative    CTA CHEST, ABDOMEN, AND PELVIS  4/24/2024 2:13 PM    CLINICAL HISTORY: Known hx of aortic aneurysm with possible  pseudoaneurysm.     COMPARISONS: [facility] CT 2/11/2024    REFERRING PROVIDER: JEFFREY VILLARREAL    TECHNIQUE: Unenhanced CT chest, abdomen, and pelvis. After the  uneventful administration of intravenous contrast, EKG gated CTA  chest, abdomen and pelvis. Coronal and sagittal reconstructions were  produced. Lung MIP produced.    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 Configuration suggests pseudoaneurysm more than a true  aneurysm but diagnosis can only be confirmed by histologic evaluation  of the aortic wall. Celiac artery exits the pseudoaneurysm.    Right innominate and left carotid arteries share a common origin.  Right innominate, subclavian, and carotid, left carotid, and left  subclavian arteries patent. Thin left vertebral artery. Right  vertebral artery patent.    Non specific fluid in the superior aortic recess.    Superior mesenteric artery patent. Bilateral single renal artery less  than 50% diameter stenosis by calcified plaques. Inferior mesenteric  artery patent.    Right common iliac artery measures 15 mm in diameter. Bilateral  common, internal, and external iliac arteries patent. Bilateral corona  mortis. Bilateral common femoral arteries patent.    Aortic measurements:       Sinuses of Valsalva: 40 mm       Sinotubular junction: 33 mm       Ascendin mm       Arch: 28 mm       Proximal descendin mm       Mid descendin mm       Diaphragm: 26 mm       Infrarenal: 20 mm       Above bifurcation: 23 mm    CT: Emphysematous changes. Scars and honeycombing.     Coronary artery calcifications.    Left shoulder replacement.    Radiodense material was in the duodenum in the non contrast scan.    22 mm left renal cyst averages 15 Hounsfield units in the arterial  phase.    Left femoral monet and screw.    Spine degenerative changes.      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 Celiac artery exits the aneurysm.     ERIKA VELAZQUEZ MD         SYSTEM ID:  [id number]       Discharge Medications   Current Discharge Medication List        CONTINUE these medications which have NOT CHANGED    Details   aspirin (ASA) 81 MG chewable tablet Take 1 tablet (81 mg) by mouth every  evening  Qty: 90 tablet, Refills: 0    Associated Diagnoses: Demand ischemia (H)      cyanocobalamin (VITAMIN B-12) 500 MCG SUBL sublingual tablet Place 2 tablets (1,000 mcg) under the tongue daily  Qty: 180 tablet, Refills: 0    Associated Diagnoses: Underweight      ferrous sulfate (FEROSUL) 325 (65 Fe) MG tablet Take 1 tablet (325 mg) by mouth daily  Qty: 30 tablet, Refills: 0    Associated Diagnoses: Underweight      fidaxomicin (DIFICID) 200 MG tablet Take 1 tablet (200 mg) by mouth 2 times daily  Qty: 20 tablet, Refills: 0    Associated Diagnoses: Fever, unspecified fever cause; Diarrhea, unspecified type      !! levETIRAcetam (KEPPRA) 1000 MG tablet Take 1 tablet (1,000 mg) by mouth every evening  Qty: 30 tablet, Refills: 1    Associated Diagnoses: Seizure disorder (H)      !! levETIRAcetam (KEPPRA) 750 MG tablet Take 1 tablet (750 mg) by mouth every morning  Qty: 30 tablet, Refills: 1    Associated Diagnoses: Seizure disorder (H)      midodrine (PROAMATINE) 5 MG tablet Take 2 tablets (10 mg) by mouth 3 times daily  Qty: 270 tablet, Refills: 11    Associated Diagnoses: Underweight      multivitamin w/minerals (THERA-VIT-M) tablet Take 1 tablet by mouth daily  Qty: 30 tablet, Refills: 0    Associated Diagnoses: Underweight      thiamine (B-1) 100 MG tablet Take 1 tablet (100 mg) by mouth daily  Qty: 90 tablet, Refills: 3    Associated Diagnoses: Underweight      vancomycin (VANCOCIN) 125 MG capsule Take 1 capsule (125 mg) by mouth 4 times daily for 10 days  Qty: 40 capsule, Refills: 0      vitamin C (ASCORBIC ACID) 500 MG tablet Take 1 tablet (500 mg) by mouth daily  Qty: 90 tablet, Refills: 1    Associated Diagnoses: Underweight      Vitamin D, Cholecalciferol, 25 MCG (1000 UT) CAPS Take 1,000 mcg by mouth daily  Qty: 90 capsule, Refills: 3    Associated Diagnoses: Underweight       !! - Potential duplicate medications found. Please discuss with provider.        Allergies   No Known Allergies

## 2024-04-25 NOTE — PLAN OF CARE
Physical Therapy Discharge Summary    Reason for therapy discharge:    Discharged to home with home therapy.    Progress towards therapy goal(s). See goals on Care Plan in [facility] electronic health record for goal details.  Goals partially met.  Barriers to achieving goals:   discharge from facility.    Therapy recommendation(s):    Continued therapy is recommended.  Rationale/Recommendations:  JOIE PT.

## 2024-04-25 NOTE — PROGRESS NOTES
"OBS PT Eval     04/25/24 1400   Appointment Info   Signing Clinician's Name / Credentials (PT) Grabiel Cueto, PT, DPT   Quick Adds   Quick Adds Certification   Living Environment   People in Home alone   Current Living Arrangements apartment   Home Accessibility no concerns   Transportation Anticipated family or friend will provide   Living Environment Comments Lives in correction   Self-Care   Usual Activity Tolerance good   Current Activity Tolerance moderate   Equipment Currently Used at Home cane, straight;shower chair;walker, rolling   Fall history within last six months yes   Number of times patient has fallen within last six months 1   Activity/Exercise/Self-Care Comment Patient mod I with single-point cane at baseline but also holds front wheeled walker that he uses intermittently.  Patient fell in December and had right hip fracture, has used single-point cane ever since.   General Information   Onset of Illness/Injury or Date of Surgery 04/25/24   Referring Physician Andrea Hughes PA-C   Patient/Family Therapy Goals Statement (PT) To return home   Pertinent History of Current Problem (include personal factors and/or comorbidities that impact the POC) Per EMR \"Trevor Larios is a 73 year old male w/ PMH significant for seizure disorder w/ breakthrough seizures, cognitive impairment, AAA, C difficile, FTT, frequent falls admitted on 4/24/2024 for observation after an unwitnessed fall suspicious for a seizure and for safe discharge planning. \"   Existing Precautions/Restrictions fall;seizures   General Observations activity: ambulate with assist   Cognition   Cognitive Status Comments appropriate but some confusion noted   Range of Motion (ROM)   Range of Motion ROM is WFL   Strength (Manual Muscle Testing)   Strength (Manual Muscle Testing) strength is WFL   Bed Mobility   Bed Mobility supine-sit;sit-supine   Supine-Sit [place] (Bed Mobility) independent   Sit-Supine [place] (Bed Mobility) independent "   Transfers   Transfers sit-stand transfer   Sit-Stand Transfer   Sit-Stand [place] (Transfers) modified independence   Assistive Device (Sit-Stand Transfers) cane, straight   Gait/Stairs (Locomotion)   [place] Level (Gait) modified independence   Assistive Device (Gait) cane, straight   Comment, (Gait/Stairs) L trendelenburg, several near collisions on the L side, poor awareness, large lateral sway from midline   Balance   Balance Comments IND sitting, IND standing   Clinical Impression   Criteria for Skilled Therapeutic Intervention Yes, treatment indicated   PT Diagnosis (PT) impaired functional mobility   Influenced by the following impairments decreased activity tolerance, transfers, gait, stairs   Functional limitations due to impairments transfers, gait, stairs   Clinical Presentation (PT Evaluation Complexity) stable   Clinical Presentation Rationale clinical judgement   Clinical Decision Making (Complexity) low complexity   Planned Therapy Interventions (PT) balance training;bed mobility training;gait training;ROM (range of motion);stair training;strengthening;transfer training;manual therapy techniques   Risk & Benefits of therapy have been explained evaluation/treatment results reviewed;care plan/treatment goals reviewed;risks/benefits reviewed;current/potential barriers reviewed;participants voiced agreement with care plan;participants included;patient   PT Total Evaluation Time   PT Eval, Low Complexity Minutes (05930) 15   Therapy Certification   Start of care date 04/25/24   Certification date from 04/25/24   Certification date to 05/09/24   Medical Diagnosis fall   Physical Therapy Goals   PT Frequency 5x/week   PT Predicted Duration/Target Date for Goal Attainment 05/09/24   PT Goals Bed Mobility;Transfers;Gait   PT: Bed Mobility Modified independent;Supine to/from sit   PT: Transfers Modified independent;Sit to/from stand;Assistive device   PT: Gait Modified independent;Assistive  device;Greater than 200 feet   Interventions   Interventions Quick Adds Gait Training;Therapeutic Activity   PT Discharge Planning   PT Plan Encourage OP follow-up, gait with single-point cane, dynamic balance   PT Discharge Recommendation (DC Rec) home with home care physical therapy   PT Rationale for DC Rec Patient mobilizing near baseline with chronic falls risks noted.  Encouraged participation in previously established home health PT.  Also encouraged use of front wheel walker while recovering from this admission.   PT Brief overview of current status Ambulates with FWW   Total Session Time   Total Session Time (sum of timed and untimed services) 15       Grabiel Cueto, PT, DPT        [facility]  OUTPATIENT PHYSICAL THERAPY EVALUATION  PLAN OF TREATMENT FOR OUTPATIENT REHABILITATION  (COMPLETE FOR INITIAL CLAIMS ONLY)  Patient's Last Name, First Name, M.I.  YOB: 1950  Trevor Larios                        Provider's Name  [facility] Medical Record No.  [id number]                             Onset Date:  04/25/24   Start of Care Date:  04/25/24   Type:     _X_PT   ___OT   ___SLP Medical Diagnosis:  fall              PT Diagnosis:  impaired functional mobility Visits from SOC:  1     See note for plan of treatment, functional goals and certification details    I CERTIFY THE NEED FOR THESE SERVICES FURNISHED UNDER        THIS PLAN OF TREATMENT AND WHILE UNDER MY CARE     (Physician co-signature of this document indicates review and certification of the therapy plan).

## 2024-04-25 NOTE — PLAN OF CARE
Occupational Therapy: Orders received. Chart reviewed and discussed with care team.? Occupational Therapy not indicated. Pt with no acute OT needs at this time. Will complete orders.

## 2024-04-25 NOTE — PROGRESS NOTES
Patient's After Visit Summary was reviewed with patient and Daughter.     Patient verbalized understanding of After Visit Summary, recommended follow up and was given an opportunity to ask questions.     Discharge medications sent home with patient/family:  Not applicable     Discharged with daughter. All belongings sent with patient.

## 2024-04-25 NOTE — TELEPHONE ENCOUNTER
April 25, 2024    Home health orders was received via fax for Dr. Crouch.  Patient label was attached to paperwork and placed in provider's inbox to be signed.    Karen Smith

## 2024-04-25 NOTE — PLAN OF CARE
Goals: diagnostic tests and consults completed and resulted : pending  -vital signs normal or at patient baseline  -safe disposition plan has been identified  Nurse to notify provider when observation goals have been met and patient is ready for discharge.     Problem: Adult Inpatient Plan of Care  Goal: Absence of Hospital-Acquired Illness or Injury  Outcome: Progressing     Problem: Adult Inpatient Plan of Care  Goal: Plan of Care Review  Description: The Plan of Care Review/Shift note should be completed every shift.  The Outcome Evaluation is a brief statement about your assessment that the patient is improving, declining, or no change.  This information will be displayed automatically on your shift  note.  Outcome: Progressing  Flowsheets (Taken 4/24/2024 2206)  Plan of Care Reviewed With:   patient   family   Goal Outcome Evaluation:      Plan of Care Reviewed With: patient, family

## 2024-04-29 ENCOUNTER — OFFICE VISIT (OUTPATIENT)
Dept: VASCULAR SURGERY | Facility: CLINIC | Age: 74
End: 2024-04-29
Payer: MEDICARE

## 2024-04-29 ENCOUNTER — PATIENT OUTREACH (OUTPATIENT)
Dept: CARE COORDINATION | Facility: CLINIC | Age: 74
End: 2024-04-29

## 2024-04-29 VITALS — HEART RATE: 82 BPM | DIASTOLIC BLOOD PRESSURE: 69 MMHG | OXYGEN SATURATION: 94 % | SYSTOLIC BLOOD PRESSURE: 107 MMHG

## 2024-04-29 DIAGNOSIS — I72.9 PSEUDOANEURYSM (H): Primary | ICD-10-CM

## 2024-04-29 DIAGNOSIS — R93.5 ABNORMAL FINDINGS ON DIAGNOSTIC IMAGING OF OTHER ABDOMINAL REGIONS, INCLUDING RETROPERITONEUM: ICD-10-CM

## 2024-04-29 DIAGNOSIS — R29.6 FALLS FREQUENTLY: ICD-10-CM

## 2024-04-29 DIAGNOSIS — R53.1 WEAKNESS: ICD-10-CM

## 2024-04-29 DIAGNOSIS — R62.7 FAILURE TO THRIVE IN ADULT: ICD-10-CM

## 2024-04-29 DIAGNOSIS — G40.909 SEIZURE DISORDER (H): ICD-10-CM

## 2024-04-29 DIAGNOSIS — I71.40 ABDOMINAL AORTIC ANEURYSM (AAA) WITHOUT RUPTURE, UNSPECIFIED PART (H): ICD-10-CM

## 2024-04-29 DIAGNOSIS — R06.02 SHORTNESS OF BREATH: ICD-10-CM

## 2024-04-29 PROCEDURE — 99205 OFFICE O/P NEW HI 60 MIN: CPT | Performed by: SURGERY

## 2024-04-29 NOTE — PROGRESS NOTES
Clinic Care Coordination Contact  [facility]/Voicemail    Clinical Data: Care Coordinator Outreach    Outreach Documentation Number of Outreach Attempt   4/29/2024  10:25 AM 1       Left message on patient's voicemail with call back information and requested return call.    Plan: Care Coordinator will try to reach patient again in 1-2 business days.    David Myhre, RN   [facility] RN  [phone]

## 2024-04-29 NOTE — LETTER
May 2, 2024      Trevor Larios  [address]  [address]  [place] [facility] [address]      Dear Dr. Crouch,    I hope this letter finds you well. I wanted to take a moment to express my sincere gratitude for referring Mr. Larios to me and [facility] Vascular Surgery team. I had the pleasure of seeing him today for his celiac artery and anterior aortic wall abnormality, which is likely a pseudoaneurysm.  While the etiology is unknown it does raise suspicion for an infectious etiology.  Unfortunately, as you know, he is quite frail and it does sound as though he has been declined at [facility] for operation.  We will investigate with a PET CT and likely will need a very thorough medical evaluation.    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    Please do not hesitate to reach out if you have any questions or require further information regarding this mutual patient. We would be happy to send over any additional supporting documentation for your records.    Thank you once again for entrusting us with the care of Mr. Larios.    Best regards,    Shine Sprague MD, RPVI   Vascular & Endovascular Surgery  [facility]  Cell phone: [phone]  Email: [email or web address]

## 2024-04-29 NOTE — Clinical Note
"4/29/2024       RE: Trevor Larios  [address]  [address]  [place] [address]     Dear Colleague,    Thank you for referring your patient, Trevor Larios, to the [facility] at [facility]. Please see a copy of my visit note below.    Vascular & Endovascular Surgery Clinic Consultation   Vascular Surgeon: Shine Sprague MD, RPVI       Location:  {BLANK SINGLE NO CONNECTOR:49836::\"[facility] [place] [facility]\",\"[facility]\"}    Trevor Larios  Medical Record #:  [id number]  YOB: 1950  Age:  73 year old     Date of Service: 4/29/2024    Referring Provider: Referred Self.  Primary Care Provider: Lyly Crouch    Chief Complaint/Reason for Visit: ***    HPI:  Mr. Larios is a pleasant 73 year old male seen today with his *** for ***    CV risk factors include ***    Relevant surgical history:  - ***    Review of Systems:    A 12 point ROS was reviewed and is negative except for symptoms noted in HPI.     Medical/Surgical History:    Past Medical History:   Diagnosis Date    Crohn's disease (H)     Dementia (H) 2023    \"moderate.\"         Past Surgical History:   Procedure Laterality Date    ORIF HIP FRACTURE  2022    SHOULDER SURGERY Right     2022?        Allergies:   No Known Allergies     Medications:    Current Outpatient Medications   Medication Sig Dispense Refill    aspirin (ASA) 81 MG chewable tablet Take 1 tablet (81 mg) by mouth every evening 90 tablet 0    cyanocobalamin (VITAMIN B-12) 500 MCG SUBL sublingual tablet Place 2 tablets (1,000 mcg) under the tongue daily 180 tablet 0    ferrous sulfate (FEROSUL) 325 (65 Fe) MG tablet Take 1 tablet (325 mg) by mouth daily 30 tablet 0    fidaxomicin (DIFICID) 200 MG tablet Take 1 tablet (200 mg) by mouth 2 times daily 20 tablet 0    levETIRAcetam (KEPPRA) 1000 MG tablet Take 1 tablet (1,000 mg) by mouth every evening 30 tablet 1    " "levETIRAcetam (KEPPRA) 750 MG tablet Take 1 tablet (750 mg) by mouth every morning 30 tablet 1    midodrine (PROAMATINE) 5 MG tablet Take 2 tablets (10 mg) by mouth 3 times daily 270 tablet 11    multivitamin w/minerals (THERA-VIT-M) tablet Take 1 tablet by mouth daily 30 tablet 0    thiamine (B-1) 100 MG tablet Take 1 tablet (100 mg) by mouth daily 90 tablet 3    vitamin C (ASCORBIC ACID) 500 MG tablet Take 1 tablet (500 mg) by mouth daily 90 tablet 1    Vitamin D, Cholecalciferol, 25 MCG (1000 UT) CAPS Take 1,000 mcg by mouth daily 90 capsule 3    vancomycin (VANCOCIN) 125 MG capsule Take 1 capsule (125 mg) by mouth 4 times daily for 10 days 40 capsule 0     No current facility-administered medications for this visit.        Social Habits:    Tobacco Use      Smoking status: Never      Smokeless tobacco: Never       Alcohol Use: Not on file       History   Drug Use Not on file        Family History:  No family history on file.    Physical Examination:  /69 (BP Location: Right arm, Patient Position: Sitting, Cuff Size: Adult Regular)   Pulse 82   SpO2 94%   Wt Readings from Last 1 Encounters:   04/25/24 57.9 kg (127 lb 11.2 oz)     There is no height or weight on file to calculate BMI.    Exam:  General: No apparent distress. Answers questions appropriately ***  Neurologic: Focally intact, Alert and oriented x 3. ***  Eyes: Grossly normal. ***  Cardiac:  ***  Pulmonary:  Non labored breathing with normal respiratory effort  Abdominal: Soft, non distended, non tender to palpation.  *** pulsatile *** mass.   Musculoskeletal/Extremity: ***/5 gross *** extremity strength. *** edema.  *** open wounds or abrasions. ***    Vascular Exam:   Carotid: {BLANK MULTIPLE NO CONNECTOR:86783::\"Bruit\",\"No Bruit\"}    Radial: Left: ***   Right: ***    Femoral: Left: ***   Right: ***    Popliteal: Left: ***   Right: ***    DP: Left: ***   Right: ***     PT:   Left: ***   Right: ***    Laboratory Findings:  Hemoglobin   Date " Value Ref Range Status   04/25/2024 10.3 (L) 13.3 - 17.7 g/dL Final   04/24/2024 9.2 (L) 13.3 - 17.7 g/dL Final     WBC Count   Date Value Ref Range Status   04/25/2024 5.2 4.0 - 11.0 10e3/uL Final   04/24/2024 8.7 4.0 - 11.0 10e3/uL Final     Platelet Count   Date Value Ref Range Status   04/25/2024 510 (H) 150 - 450 10e3/uL Final   04/24/2024 454 (H) 150 - 450 10e3/uL Final     Creatinine   Date Value Ref Range Status   04/25/2024 0.92 0.67 - 1.17 mg/dL Final     Sodium   Date Value Ref Range Status   04/25/2024 140 135 - 145 mmol/L Final     Comment:     Reference intervals for this test were updated on 09/26/2023 to more accurately reflect our healthy population. There may be differences in the flagging of prior results with similar values performed with this method. Interpretation of those prior results can be made in the context of the updated reference intervals.      Glucose   Date Value Ref Range Status   04/25/2024 95 70 - 99 mg/dL Final   04/24/2024 96 70 - 99 mg/dL Final     INR   Date Value Ref Range Status   04/08/2024 1.24 (H) 0.85 - 1.15 Final     ***    Imaging:    I personally reviewed the *** from *** which shows ***    Impression/Shared Medical Decision Making:    #1- ***  Mr. Larios is a pleasant 73 year old male evaluated for ***.    Risks, benefits, and alternatives ***.    Discussed warning signs including, but not limited to, ***.  If he experiences any of these, he has been advised to seek treatment and contact my team.    Mr. Larios *** and his *** are in agreement with this plan as outlined.    Recommendations/Plan:  ***  OMT ***    Shine Sprague MD  Vascular & Endovascular Surgery      *** minutes spent on the day of encounter doing chart review from our system and care everywhere, history and exam, documentation, coordinating care, and further activities as noted with over half spent counseling. ***       Again, thank you for allowing me to participate in the care of your  patient.      Sincerely,    Shine Sprague MD

## 2024-04-29 NOTE — PROGRESS NOTES
"Vascular & Endovascular Surgery Clinic Consultation   Vascular Surgeon: Shine Sprague MD, RPVI       Location:  [facility]    Trevor Larios  Medical Record #:  [id number]  YOB: 1950  Age:  73 year old     Date of Service: 4/29/2024    Referring Provider: Referred Self.  Primary Care Provider: Lyly Crouch    Chief Complaint/Reason for Visit: Third opinion on aneurysmal disease    The patient is quite quiet with known cognitive impairment secondary to dementia documented in the medical record and thus far, some information has been obtained with the help of his daughter as an independent historian      HPI:  Mr. 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  This has previously been evaluated in [place] and subsequently in [facility].  The has a history of Crohn's disease with some abdominal pain and recent C. Diff infection (seen with contact precautions).  He has no abdominal pain now or back pain.  No overt claudication, ischemic rest pain, or lower extremity wounds.  No constitutional symptoms including fever, chills, night sweats.      CV risk factors include mild cognitive impairment with likely dementia, CAD, COPD, Malnutrition, Frailty, Seizures, and is not a smoker.    Relevant surgical history:  - Orthopedic procedures    Review of Systems:    A 12 point ROS was reviewed and is negative except for symptoms noted in HPI.     Medical/Surgical History:    Past Medical History:   Diagnosis Date    Crohn's disease (H)     Dementia (H) 2023    \"moderate.\"         Past Surgical History:   Procedure Laterality Date    ORIF HIP FRACTURE  2022    SHOULDER SURGERY Right     2022?        Allergies:   No Known Allergies     Medications:    Current Outpatient Medications   Medication Sig Dispense Refill    aspirin (ASA) 81 MG chewable " tablet Take 1 tablet (81 mg) by mouth every evening 90 tablet 0    cyanocobalamin (VITAMIN B-12) 500 MCG SUBL sublingual tablet Place 2 tablets (1,000 mcg) under the tongue daily 180 tablet 0    ferrous sulfate (FEROSUL) 325 (65 Fe) MG tablet Take 1 tablet (325 mg) by mouth daily 30 tablet 0    fidaxomicin (DIFICID) 200 MG tablet Take 1 tablet (200 mg) by mouth 2 times daily 20 tablet 0    levETIRAcetam (KEPPRA) 1000 MG tablet Take 1 tablet (1,000 mg) by mouth every evening 30 tablet 1    levETIRAcetam (KEPPRA) 750 MG tablet Take 1 tablet (750 mg) by mouth every morning 30 tablet 1    midodrine (PROAMATINE) 5 MG tablet Take 2 tablets (10 mg) by mouth 3 times daily 270 tablet 11    multivitamin w/minerals (THERA-VIT-M) tablet Take 1 tablet by mouth daily 30 tablet 0    thiamine (B-1) 100 MG tablet Take 1 tablet (100 mg) by mouth daily 90 tablet 3    vitamin C (ASCORBIC ACID) 500 MG tablet Take 1 tablet (500 mg) by mouth daily 90 tablet 1    Vitamin D, Cholecalciferol, 25 MCG (1000 UT) CAPS Take 1,000 mcg by mouth daily 90 capsule 3    vancomycin (VANCOCIN) 125 MG capsule Take 1 capsule (125 mg) by mouth 4 times daily for 10 days 40 capsule 0     No current facility-administered medications for this visit.        Social Habits:    Tobacco Use      Smoking status: Never      Smokeless tobacco: Never       Alcohol Use: Not on file       History   Drug Use Not on file        Family History:  No family history on file.    Physical Examination:  /69 (BP Location: Right arm, Patient Position: Sitting, Cuff Size: Adult Regular)   Pulse 82   SpO2 94%   Wt Readings from Last 1 Encounters:   04/25/24 57.9 kg (127 lb 11.2 oz)     There is no height or weight on file to calculate BMI.    Exam:  General: No apparent distress, but quite frail appearing.  Malnourished with evidence of temporal wasting. Answers simple directed questions but often defers to daughter and occasionally loses track of the conversation  Neurologic:  Focally intact   Eyes: Grossly normal.   Cardiac:  RRR  Pulmonary:  Non labored breathing with normal respiratory effort  Abdominal: Soft, non distended, non tender to palpation.  Nontender pulsatile mass.   Musculoskeletal/Extremity: 4/5 gross upper and lower extremity strength.     Vascular Exam:     Femoral: Left: 2+   Right: 2+    Laboratory Findings:  Hemoglobin   Date Value Ref Range Status   04/25/2024 10.3 (L) 13.3 - 17.7 g/dL Final   04/24/2024 9.2 (L) 13.3 - 17.7 g/dL Final     WBC Count   Date Value Ref Range Status   04/25/2024 5.2 4.0 - 11.0 10e3/uL Final   04/24/2024 8.7 4.0 - 11.0 10e3/uL Final     Platelet Count   Date Value Ref Range Status   04/25/2024 510 (H) 150 - 450 10e3/uL Final   04/24/2024 454 (H) 150 - 450 10e3/uL Final     Creatinine   Date Value Ref Range Status   04/25/2024 0.92 0.67 - 1.17 mg/dL Final     Sodium   Date Value Ref Range Status   04/25/2024 140 135 - 145 mmol/L Final     Comment:     Reference intervals for this test were updated on 09/26/2023 to more accurately reflect our healthy population. There may be differences in the flagging of prior results with similar values performed with this method. Interpretation of those prior results can be made in the context of the updated reference intervals.      Glucose   Date Value Ref Range Status   04/25/2024 95 70 - 99 mg/dL Final   04/24/2024 96 70 - 99 mg/dL Final     INR   Date Value Ref Range Status   04/08/2024 1.24 (H) 0.85 - 1.15 Final       Imaging:    I personally reviewed the CTA chest abdomen and pelvis from 4/24/2024 and compared to that from outside dating 2/11/2024 which shows normal caliber aortic arch and descending thoracic aorta.  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 This is stable in size from the 2/24 scan.  No obvious stranding  or inflammation.  The SMA is diminuative.  There is bilateral mild renal artery stenosis.  The terminal  infrarenal aorta is ectatic measuring approximately 25 mm.  Patent iliofemoral system. Emphysematous changes in the lungs.      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 Diff infection  #9- Unspecified dementia with mild to moderate cognitive impairment (MOCA 14-21/30)  #10- Multiple visits the emergency room  #11- Recent isolated leukocytosis  Mr. 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  He reportedly was being evaluated for surgery but found to be too high risk at [facility].  I do not have this documentation but do have some sparse records which I have reviewed.      Risks, benefits, and alternatives including but not limited to the risk of death, anesthetic or cardiopulmonary complications, bleeding, infection, myocardial infarction, stroke, renal insufficiency requiring temporary or permanent dialysis, bowel infarction necessitating bowel resection and colostomy, hernia, vessel injury, dissection, distal embolization, perforation, worsening ischemia with either compartment syndrome or limb loss and spinal cord injury.  Discussed possibility of interval aortic/celiac artery events including rupture.  We discussed that given his numerous co morbidities and significant frailty, he will be at very high risk.  By report he was deemed too high risk at [facility], however I do not see documentation of this.  We will need to be quite thorough in his preoperative evaluation to determine what, if any operation, he can tolerate.  The pseudoaneurysmal appearance of the abnormality and location in the paravisceral segment,  in conjunction with single reported positive blood culture which had been attributed to contaminant does require need evaluation for infectious etiology.    Discussed warning signs including, but not limited to, symptomatic aneurysms, aortic rupture, new abdominal or back pain, hypotension, or syncope/lightheadedness.  If he experiences any of these, he has been advised to seek treatment and contact my team.    Mr. Larios and his daughter are in agreement with this plan as outlined.    Recommendations/Plan:  We will obtain PET CT to evaluation for inflammatory changes and infection.  Will need comprehensive medical evaluation  He will need thorough medical evaluation and perhaps reconsideration of engagement of palliative care here to help guide his wishes.    Shine Sprague MD  Vascular & Endovascular Surgery      60 minutes spent on the day of encounter doing chart review from our system and care everywhere, history and exam, documentation, coordinating care, and further activities as noted with over half spent counseling.

## 2024-05-02 ENCOUNTER — PATIENT OUTREACH (OUTPATIENT)
Dept: CARE COORDINATION | Facility: CLINIC | Age: 74
End: 2024-05-02
Payer: COMMERCIAL

## 2024-05-02 NOTE — PROGRESS NOTES
Clinic Care Coordination Contact  [facility]/Voicemail    Clinical Data: Care Coordinator Outreach    Outreach Documentation Number of Outreach Attempt   4/29/2024  10:25 AM 1   5/2/2024   3:44 PM 2       Left message on patient's voicemail with call back information and requested return call.    Plan: Care Coordinator will send care coordination introduction letter with care coordinator contact information and explanation of care coordination services via [employer]. Care Coordinator will do no further outreaches at this time.    David Myhre, RN   CCC RN

## 2024-05-02 NOTE — LETTER
[facility] CARE COORDINATION  [facility]    May 2, 2024    Trevor Larios  [address]  [address]  [place] [address]      Dear Trevor,    I am a clinic care coordinator who works with CATHY FLORES MD with the [facility]. I have been trying to reach you recently to introduce Clinic Care Coordination. Below is a description of clinic care coordination and how I can further assist you.       The clinic care coordination team is made up of a registered nurse, , financial resource worker and community health worker who understand the health care system. The goal of clinic care coordination is to help you manage your health and improve access to the health care system. Our team works alongside your provider to assist you in determining your health and social needs. We can help you obtain health care and community resources, providing you with necessary information and education. We can work with you through any barriers and develop a care plan that helps coordinate and strengthen the communication between you and your care team.  Our services are voluntary and are offered without charge to you personally.    Please feel free to contact me with any questions or concerns regarding care coordination and what we can offer.      We are focused on providing you with the highest-quality healthcare experience possible.    Sincerely,     David Myhre, RN   [facility] RN  [phone]

## 2024-05-02 NOTE — TELEPHONE ENCOUNTER
May 2, 2024    Home health orders was picked up from outbox of Dr. Crouch.  sent via fax to [phone]    Karen Smith

## 2024-05-03 ENCOUNTER — TELEPHONE (OUTPATIENT)
Dept: FAMILY MEDICINE | Facility: CLINIC | Age: 74
End: 2024-05-03
Payer: COMMERCIAL

## 2024-05-03 NOTE — TELEPHONE ENCOUNTER
May 3, 2024    Home health orders was picked up from outbox of Dr. Crouch.  sent via fax to [phone]    Karen Smith

## 2024-05-03 NOTE — TELEPHONE ENCOUNTER
May 3, 2024    Home health orders was picked up from outbox of Dr. Crouch.  sent via fax to [phone]    Karen Smith

## 2024-05-03 NOTE — TELEPHONE ENCOUNTER
May 3, 2024    Home health orders was received via fax for Dr. Crouch.  Patient label was attached to paperwork and placed in provider's inbox to be signed.    Karen Smith

## 2024-05-06 NOTE — TELEPHONE ENCOUNTER
"[facility] order number [id number] returned to Dr Crouch for \"Order was returned with different signature dates\"    Sent to resign and redate  "

## 2024-05-07 ENCOUNTER — TELEPHONE (OUTPATIENT)
Dept: FAMILY MEDICINE | Facility: CLINIC | Age: 74
End: 2024-05-07
Payer: COMMERCIAL

## 2024-05-07 NOTE — TELEPHONE ENCOUNTER
May 7, 2024    Home health orders was received via fax for Dr. Crouch.  Patient label was attached to paperwork and placed in provider's inbox to be signed.    Pam J. Behr

## 2024-05-09 ENCOUNTER — TELEPHONE (OUTPATIENT)
Dept: FAMILY MEDICINE | Facility: CLINIC | Age: 74
End: 2024-05-09
Payer: COMMERCIAL

## 2024-05-09 DIAGNOSIS — Z53.9 DIAGNOSIS NOT YET DEFINED: Primary | ICD-10-CM

## 2024-05-09 PROCEDURE — G0180 MD CERTIFICATION HHA PATIENT: HCPCS | Performed by: FAMILY MEDICINE

## 2024-05-09 NOTE — TELEPHONE ENCOUNTER
May 9, 2024    Home health orders was picked up from outbox of Dr. Crouch.  sent via fax to [phone]    Karen Smith

## 2024-05-09 NOTE — TELEPHONE ENCOUNTER
May 9, 2024    Home health orders was received via fax for Dr. Crouch.  Patient label was attached to paperwork and placed in provider's inbox to be signed.    Karen Smith

## 2024-05-10 ENCOUNTER — OFFICE VISIT (OUTPATIENT)
Dept: FAMILY MEDICINE | Facility: CLINIC | Age: 74
End: 2024-05-10
Payer: COMMERCIAL

## 2024-05-10 VITALS
TEMPERATURE: 98.2 F | SYSTOLIC BLOOD PRESSURE: 110 MMHG | BODY MASS INDEX: 19.4 KG/M2 | DIASTOLIC BLOOD PRESSURE: 64 MMHG | WEIGHT: 131 LBS | RESPIRATION RATE: 14 BRPM | OXYGEN SATURATION: 99 % | HEART RATE: 77 BPM | HEIGHT: 69 IN

## 2024-05-10 DIAGNOSIS — R19.7 DIARRHEA, UNSPECIFIED TYPE: Primary | ICD-10-CM

## 2024-05-10 DIAGNOSIS — R50.9 FEVER, UNSPECIFIED FEVER CAUSE: ICD-10-CM

## 2024-05-10 DIAGNOSIS — A04.72 C. DIFFICILE COLITIS: ICD-10-CM

## 2024-05-10 DIAGNOSIS — Z48.02 ENCOUNTER FOR REMOVAL OF SUTURES: ICD-10-CM

## 2024-05-10 DIAGNOSIS — K92.1 BLOOD IN STOOL: ICD-10-CM

## 2024-05-10 PROCEDURE — 99214 OFFICE O/P EST MOD 30 MIN: CPT | Performed by: FAMILY MEDICINE

## 2024-05-10 RX ORDER — LOPERAMIDE HYDROCHLORIDE 2 MG/1
2 TABLET ORAL 4 TIMES DAILY PRN
Qty: 20 TABLET | Refills: 2 | Status: SHIPPED | OUTPATIENT
Start: 2024-05-10 | End: 2024-06-06

## 2024-05-10 NOTE — TELEPHONE ENCOUNTER
May 10, 2024    Home health orders was picked up from outbox of Dr. Crouch and sent via fax to [phone].    Shahnaz Waldrop

## 2024-05-10 NOTE — PROGRESS NOTES
Assessment & Plan     Diarrhea, unspecified type  He was treated for C difficile last month. Fidaxomicin was ordered, but he did not have insurance for medications at that time, so they went to the ED and were given Flagyl. It helped while he was on it. However, the diarrhea is back and he is incontinent in the bed and his pants. He has Depends, but he does not like their fit.  - C. difficile Toxin B PCR with reflex to C. difficile Antigen and Toxins A/B EIA  - loperamide (IMODIUM A-D) 2 MG tablet  Dispense: 20 tablet; Refill: 2  - C. difficile Toxin B PCR with reflex to C. difficile Antigen and Toxins A/B EIA  - C. difficile Antigen and Toxins A/B by Enzyme Immunoassay  - fidaxomicin (DIFICID) 200 MG tablet  Dispense: 20 tablet; Refill: 0  - Incontinence Supplies Order for DME - ONLY FOR DME    Encounter for removal of sutures  5 sutures removed from the finger dorsum. It was very well healed which made removing the sutures a little challenging.    Blood in stool  Perhaps due to the diarrhea. He has a history of ulcerative colitis which he controls with diet.    Fever, unspecified fever cause  - fidaxomicin (DIFICID) 200 MG tablet  Dispense: 20 tablet; Refill: 0    C. difficile colitis  MED REC REQUIRED  Post Medication Reconciliation Status: discharge medications reconciled, continue medications without change  Stef Murray is a 73 year old, presenting for the following health issues:  RECHECK (Follow up on C diff) and Suture Removal (Needs sutures removed from left hand)        5/10/2024     1:19 PM   Additional Questions   Roomed by Mahogany   Accompanied by daughter     Not eating much because it triggers a BM. The BM's are usually bloody now. His anus is sore.      History of Present Illness       Reason for visit:  C diff follow up    He eats 0-1 servings of fruits and vegetables daily.He consumes 0 sweetened beverage(s) daily.He exercises with enough effort to increase his heart rate 9 or less minutes  "per day.  He exercises with enough effort to increase his heart rate 3 or less days per week.   He is taking medications regularly.     Hospital Follow-up Visit:    Hospital/Nursing Home/IP Rehab Facility: [facility]  Date of Admission: 4/24/24  Date of Discharge: 4/25/24  Reason(s) for Admission: C diff  Was the patient in the ICU or did the patient experience delirium during hospitalization?  No  Do you have any other stressors you would like to discuss with your provider? No    Problems taking medications regularly:  None  Medication changes since discharge: None  Problems adhering to non-medication therapy:  None    Summary of hospitalization:  [facility] discharge summary reviewed  Diagnostic Tests/Treatments reviewed.  Follow up needed: none  Other Healthcare Providers Involved in Patient s Care:         None  Update since discharge: improved.     Plan of care communicated with patient and family       Objective    /64   Pulse 77   Temp 98.2  F (36.8  C) (Oral)   Resp 14   Ht 1.753 m (5' 9\")   Wt 59.4 kg (131 lb)   SpO2 99%   BMI 19.35 kg/m    Body mass index is 19.35 kg/m .  Physical Exam   GENERAL: alert and no distress  EYES: Eyes grossly normal to inspection, PERRL and conjunctivae and sclerae normal  HENT: ear canals and TM's normal, nose and mouth without ulcers or lesions  NECK: no adenopathy, no asymmetry, masses, or scars  RESP: lungs clear to auscultation - no rales, rhonchi or wheezes  CV: regular rate and rhythm, normal S1 S2, no S3 or S4, no murmur, click or rub, no peripheral edema  ABDOMEN: soft, nontender, no hepatosplenomegaly, no masses and bowel sounds normal  MS: no gross musculoskeletal defects noted, no edema  SKIN: no suspicious lesions or rashes.Finger laceration healed.  NEURO: Normal strength and tone, mentation intact and speech normal  PSYCH: mentation appears normal, affect normal/bright    Results for " orders placed or performed in visit on 05/10/24   C. difficile Toxin B PCR with reflex to C. difficile Antigen and Toxins A/B EIA     Status: Abnormal    Specimen: Per Rectum; Stool   Result Value Ref Range    C Difficile Toxin B by PCR Positive (A) Negative    Narrative    The [employer] Xpert C. difficile Assay, performed on the [employer]  [employer]ument Systems, is a qualitative in vitro diagnostic test for rapid detection of toxin B gene sequences from unformed (liquid or soft) stool specimens collected from patients suspected of having Clostridioides difficile infection (CDI). The test utilizes automated real-time polymerase chain reaction (PCR) to detect toxin gene sequences associated with toxin producing C. difficile. The Xpert C. difficile Assay is intended as an aid in the diagnosis of CDI.   C. difficile Antigen and Toxins A/B by Enzyme Immunoassay     Status: Abnormal    Specimen: Per Rectum; Stool   Result Value Ref Range    C. difficile GDH Antigen Positive (A) Negative    C. difficile Toxin Positive (A) Negative    Narrative    C. difficile GDH antigen and C. difficile toxin were detected by enzyme immunoassay. Results must be interpreted based on clinical findings and are supportive of C. difficile infection.   Signed Electronically by: CATHY FLORES MD

## 2024-05-10 NOTE — TELEPHONE ENCOUNTER
May 10, 2024    Home health orders was picked up from outbox of Dr. Crouch.  sent via fax to [phone]    Karen Smith

## 2024-05-11 LAB
C DIFF GDH STL QL IA: POSITIVE
C DIFF TOX A+B STL QL IA: POSITIVE
C DIFF TOX B STL QL: POSITIVE

## 2024-05-11 PROCEDURE — 87324 CLOSTRIDIUM AG IA: CPT | Mod: 59 | Performed by: FAMILY MEDICINE

## 2024-05-11 PROCEDURE — 87493 C DIFF AMPLIFIED PROBE: CPT | Performed by: FAMILY MEDICINE

## 2024-05-15 ENCOUNTER — TELEPHONE (OUTPATIENT)
Dept: VASCULAR SURGERY | Facility: CLINIC | Age: 74
End: 2024-05-15
Payer: COMMERCIAL

## 2024-05-15 ENCOUNTER — TELEPHONE (OUTPATIENT)
Dept: FAMILY MEDICINE | Facility: CLINIC | Age: 74
End: 2024-05-15
Payer: COMMERCIAL

## 2024-05-15 ENCOUNTER — MEDICAL CORRESPONDENCE (OUTPATIENT)
Dept: HEALTH INFORMATION MANAGEMENT | Facility: CLINIC | Age: 74
End: 2024-05-15

## 2024-05-15 NOTE — TELEPHONE ENCOUNTER
Called and talked with pt's dtr re: canceled PET. Unfortunately pt has been dealing with an active c-diff infection and has been feeling unwell. I will touch base with them in 1-2 weeks to check in.    LALY SanchezN, RN  RN Care Coordinator  [facility] Vascular Surgery - [facility] phone: [phone]  Fax: [phone]

## 2024-05-15 NOTE — TELEPHONE ENCOUNTER
Home Health Care    Reason for call:  Verbal orders    Orders are needed for this patient.  Skilled Nursinx a week for 4 weeks and then every other week for 4 weeks for disease management     PT and OT - eval and treat     Medical social worker to help with insurance issue    Pt Provider: Willa     Phone Number Homecare Nurse can be reached at: [phone]       Okay to leave a detailed message?: Yes at Other phone number:  [phone]

## 2024-05-16 NOTE — TELEPHONE ENCOUNTER
Home Care is calling regarding an established patient with [facility].        4/22/2024    10:42 AM   Home Care Information   Current following provider Willa   [facility] agency [facility]     Requesting orders from: Lyly Crouch  Provider is following patient: Yes  Is this a 60-day recertification request?  No    Orders Requested    Skilled Nursing  Request for initial certification (first set of orders)   Frequency:  1x/wk for 4 wks  then every other week for 4 weeks for disease management     Physical Therapy  Request for initial evaluation and treatment (one time)     Occupational Therapy  Request for initial evaluation and treatment (one time)     Social Work  Request for initial evaluation and treatment (one time)       Verbal orders given.  Home Care will send orders for provider to sign.  Information was gathered and will be sent to provider for review.  RN will contact Home Care with information after provider review.  Confirmed ok to leave a detailed message with call back.  Contact information confirmed and updated as needed.  See below for provider approval    Adolph Mace RN

## 2024-05-19 ENCOUNTER — HEALTH MAINTENANCE LETTER (OUTPATIENT)
Age: 74
End: 2024-05-19

## 2024-05-22 ENCOUNTER — TELEPHONE (OUTPATIENT)
Dept: FAMILY MEDICINE | Facility: CLINIC | Age: 74
End: 2024-05-22
Payer: COMMERCIAL

## 2024-05-22 NOTE — TELEPHONE ENCOUNTER
Home Care is calling regarding an established patient with [facility].        4/22/2024    10:42 AM   Home Care Information   Current following provider Willa   [facility]     Requesting orders from: Lyly Crouch  Provider is following patient: Yes  Is this a 60-day recertification request?  No    Orders Requested    Physical Therapy  Request for initial certification (first set of orders)   Frequency:  1 time a week for 4 weeks, 1 time every other week for 4 weeks      Information was gathered and will be sent to provider for review.  RN will contact Home Care with information after provider review.    Meliza Bruce RN

## 2024-05-22 NOTE — TELEPHONE ENCOUNTER
Home Health Care    Reason for call:  verbal orders    Orders are needed for this patient.  PT: 1 time a week for 4 weeks, 1 time every other week for 4 weeks    Pt Provider: Dr Crouch    Phone Number Homecare Nurse can be reached at: [phone] ok for detailed message

## 2024-05-24 ENCOUNTER — TELEPHONE (OUTPATIENT)
Dept: FAMILY MEDICINE | Facility: CLINIC | Age: 74
End: 2024-05-24
Payer: COMMERCIAL

## 2024-05-24 NOTE — TELEPHONE ENCOUNTER
May 24, 2024    Home health orders was received via fax for Dr. Crouch.  Patient label was attached to paperwork and placed in provider's inbox to be signed.    Karen Smith

## 2024-05-24 NOTE — TELEPHONE ENCOUNTER
Home Care is calling regarding an established patient with  [facility].        4/22/2024    10:42 AM   Home Care Information   Current following provider Willa   [facility]     Requesting orders from: Lyly Crouch  Provider is following patient: Yes  Is this a 60-day recertification request?  No    Orders Requested    Physical Therapy  Request for initial certification (first set of orders)   Frequency:  1x/wk for 4 wks then 1x/every other week for 4 weeks      Verbal orders given.       Luciana Syed RN

## 2024-05-28 ENCOUNTER — TELEPHONE (OUTPATIENT)
Dept: FAMILY MEDICINE | Facility: CLINIC | Age: 74
End: 2024-05-28
Payer: COMMERCIAL

## 2024-05-28 NOTE — TELEPHONE ENCOUNTER
Home Health Care    Reason for call:  Verbal Orders    Orders are needed for this patient.  OT: 1 visit every other week for 3 visits for ADL and upper body strengthening    Pt Provider: Dr Crouch    Phone Number Homecare Nurse can be reached at: [phone], Alyssa, secure voicemail

## 2024-05-28 NOTE — TELEPHONE ENCOUNTER
May 28, 2024    Home health orders was received via fax for Dr. Crouch.  Patient label was attached to paperwork and placed in provider's inbox to be signed.    Pam J. Behr

## 2024-05-29 ENCOUNTER — TELEPHONE (OUTPATIENT)
Dept: FAMILY MEDICINE | Facility: CLINIC | Age: 74
End: 2024-05-29
Payer: COMMERCIAL

## 2024-05-29 DIAGNOSIS — R50.9 FEVER, UNSPECIFIED FEVER CAUSE: ICD-10-CM

## 2024-05-29 DIAGNOSIS — R19.7 DIARRHEA, UNSPECIFIED TYPE: ICD-10-CM

## 2024-05-29 RX ORDER — FIDAXOMICIN 200 MG/1
200 TABLET, FILM COATED ORAL 2 TIMES DAILY
Qty: 20 TABLET | Refills: 0 | Status: SHIPPED | OUTPATIENT
Start: 2024-05-29 | End: 2024-07-16

## 2024-05-29 NOTE — TELEPHONE ENCOUNTER
May 29, 2024    Home health orders was received via fax for Dr. Crouch.  Patient label was attached to paperwork and placed in provider's inbox to be signed.    Karen Smith

## 2024-05-31 DIAGNOSIS — Z53.9 DIAGNOSIS NOT YET DEFINED: Primary | ICD-10-CM

## 2024-05-31 PROCEDURE — G0180 MD CERTIFICATION HHA PATIENT: HCPCS | Performed by: FAMILY MEDICINE

## 2024-05-31 PROCEDURE — 99207 PR MD CERTIFICATION HHA PATIENT: CPT | Performed by: FAMILY MEDICINE

## 2024-06-03 ENCOUNTER — MYC MEDICAL ADVICE (OUTPATIENT)
Dept: FAMILY MEDICINE | Facility: CLINIC | Age: 74
End: 2024-06-03
Payer: COMMERCIAL

## 2024-06-03 DIAGNOSIS — R63.6 UNDERWEIGHT: ICD-10-CM

## 2024-06-03 DIAGNOSIS — G40.909 SEIZURE DISORDER (H): ICD-10-CM

## 2024-06-03 DIAGNOSIS — A04.72 C. DIFFICILE COLITIS: Primary | ICD-10-CM

## 2024-06-03 NOTE — TELEPHONE ENCOUNTER
Tawana 3, 2024    Home health orders was picked up from outbox of Dr. Crouch and sent via fax to [phone].    Karen Smith

## 2024-06-03 NOTE — TELEPHONE ENCOUNTER
Tawana 3, 2024    Home health orders was picked up from outbox of Dr. Crouch and sent via fax to [phone].    Karen Smith

## 2024-06-05 ENCOUNTER — TELEPHONE (OUTPATIENT)
Dept: FAMILY MEDICINE | Facility: CLINIC | Age: 74
End: 2024-06-05
Payer: COMMERCIAL

## 2024-06-05 RX ORDER — LEVETIRACETAM 750 MG/1
750 TABLET ORAL EVERY MORNING
Qty: 30 TABLET | Refills: 1 | OUTPATIENT
Start: 2024-06-05

## 2024-06-05 RX ORDER — MIDODRINE HYDROCHLORIDE 5 MG/1
10 TABLET ORAL 3 TIMES DAILY
Qty: 270 TABLET | Refills: 11 | Status: SHIPPED | OUTPATIENT
Start: 2024-06-05

## 2024-06-05 RX ORDER — LEVETIRACETAM 1000 MG/1
1000 TABLET ORAL EVERY EVENING
Qty: 30 TABLET | Refills: 1 | OUTPATIENT
Start: 2024-06-05

## 2024-06-05 RX ORDER — MIDODRINE HYDROCHLORIDE 5 MG/1
10 TABLET ORAL 3 TIMES DAILY
Qty: 270 TABLET | Refills: 11 | OUTPATIENT
Start: 2024-06-05

## 2024-06-05 NOTE — TELEPHONE ENCOUNTER
Home Health Care    Reason for call:  Medication question. Patient has not taken Dificid in 2 weeks but now the diarrhea is back. Should the patient start taking it again?    Pt Provider: Dr Crouch    Phone Number Homecare Nurse can be reached at: [phone], Ari Naranjo voicemail

## 2024-06-06 ENCOUNTER — TELEPHONE (OUTPATIENT)
Dept: NEUROLOGY | Facility: CLINIC | Age: 74
End: 2024-06-06

## 2024-06-06 DIAGNOSIS — R19.7 DIARRHEA, UNSPECIFIED TYPE: ICD-10-CM

## 2024-06-06 DIAGNOSIS — A04.72 C. DIFFICILE COLITIS: Primary | ICD-10-CM

## 2024-06-06 RX ORDER — LOPERAMIDE HYDROCHLORIDE 2 MG/1
2 TABLET ORAL 4 TIMES DAILY PRN
Qty: 20 TABLET | Refills: 2 | Status: SHIPPED | OUTPATIENT
Start: 2024-06-06

## 2024-06-06 NOTE — TELEPHONE ENCOUNTER
[facility] Call Center    Phone Message    May a detailed message be left on voicemail: yes     Reason for Call: Medication Refill Request    Has the patient contacted the pharmacy for the refill? Yes   Name of medication being requested: levETIRAcetam (KEPPRA) 750 MG tablet and levETIRAcetam (KEPPRA) 1000 MG tablet  Provider who prescribed the medication: Enzo Real MD from ED on [place]  Pharmacy: [id number] - [place], [place] - [address] [facility] [address]  Date medication is needed: 6/10/24     Rylee stated that she called to refill this medication at the discharge pharmacy at the [facility]. She was informed by them that Trevor is needing to request a refill from his neurology provider, which appointment is not until August. Trevor' PCP, Dr. Melissa will not fill this medication.    Rylee is at a loss of how to get Trevor his medication before he runs out. Rylee is requesting a call to inform her if a refill of this medication can or can not be sent for Trevor.    No C2C on file, however has power of  scanned in on 3/15/24.    Action Taken: Message routed to:  Other: [facility]    Travel Screening: Not Applicable     Date of Service:

## 2024-06-07 ENCOUNTER — TELEPHONE (OUTPATIENT)
Dept: FAMILY MEDICINE | Facility: CLINIC | Age: 74
End: 2024-06-07
Payer: COMMERCIAL

## 2024-06-07 DIAGNOSIS — G40.909 SEIZURE DISORDER (H): ICD-10-CM

## 2024-06-07 RX ORDER — LEVETIRACETAM 1000 MG/1
1000 TABLET ORAL EVERY EVENING
Qty: 30 TABLET | Refills: 1 | Status: CANCELLED | OUTPATIENT
Start: 2024-06-07

## 2024-06-07 RX ORDER — LEVETIRACETAM 750 MG/1
750 TABLET ORAL EVERY MORNING
Qty: 30 TABLET | Refills: 1 | Status: SHIPPED | OUTPATIENT
Start: 2024-06-07 | End: 2024-08-23

## 2024-06-07 RX ORDER — LEVETIRACETAM 750 MG/1
750 TABLET ORAL EVERY MORNING
Qty: 30 TABLET | Refills: 1 | Status: CANCELLED | OUTPATIENT
Start: 2024-06-07

## 2024-06-07 RX ORDER — VANCOMYCIN HYDROCHLORIDE 125 MG/1
125 CAPSULE ORAL 4 TIMES DAILY
Qty: 40 CAPSULE | Refills: 0 | Status: SHIPPED | OUTPATIENT
Start: 2024-06-07 | End: 2024-06-17

## 2024-06-07 RX ORDER — LEVETIRACETAM 1000 MG/1
1000 TABLET ORAL EVERY EVENING
Qty: 30 TABLET | Refills: 1 | Status: SHIPPED | OUTPATIENT
Start: 2024-06-07 | End: 2024-06-11

## 2024-06-07 NOTE — TELEPHONE ENCOUNTER
General Call    Contacts         Type Contact Phone/Fax    06/07/2024 01:53 PM CDT Phone (Incoming) Luis Felipe Trinidad (Son) [phone]          Reason for Call:  Medication Issues     What are your questions or concerns:  Pts son (not on CTC) called in and stated that pt is having issues picking up the Dificid and his Keppra. Pt will be running out of Keppra soon and already has had a few issues. Please reach out to pt to get more information as we could not speak with son.     Date of last appointment with provider: 05/10/24    Could we send this information to you in [facility] [place] or would you prefer to receive a phone call?:   Patient would prefer a phone call   Okay to leave a detailed message?: Yes at Home number on file [phone] (home)

## 2024-06-07 NOTE — TELEPHONE ENCOUNTER
I saw [employer] message from daughter before seeing this - they said visit in August so I sent in 2 months  worth- sure Dr. Crouch could add a third month if necessary

## 2024-06-07 NOTE — TELEPHONE ENCOUNTER
Staff message received regarding the patient's Keppra:    Samantha Romano RN  [facility]  Good Afternoon,    I am one of the nurses working at [facility] in [place]. We received a phone call on behalf of Trevor who sees Dr. Crouch.    He is running into issues with getting his Levetiracetam refilled. He was seen in the ED in April for breakthrough seizures and was discharged on Keppra. He is scheduled to see our neurologist, Dr. Betancourt, in August, but unfortunately we are not allowed to provide him with any medical advice or medications until he is seen by a provider here. We are hoping for him to be able to get his medications up until he sees us and to prevent any further seizures, wondering if his PCP could give him around a 90 day supply of the Keppra to get him up to his appointment with us.     He is currently taking 750 mg AM and 1,000 mg PM. Please let us know if this is something you could do for him as it sounds like he has run out of options.    Thank you! ENRICO Shah

## 2024-06-07 NOTE — TELEPHONE ENCOUNTER
Secure chat sent to patients PCP, Dr. Crouch asking for 90 day supply of keppra up until patient is seen by Dr. Betancourt in clinic.

## 2024-06-07 NOTE — TELEPHONE ENCOUNTER
June 7, 2024    Home health orders was picked up from outbox of Dr. Koch and sent via fax to [phone].    Shahnaz Waldrop

## 2024-06-07 NOTE — TELEPHONE ENCOUNTER
June 7, 2024     Home health orders was picked up from outbox of Dr. Koch and sent via fax to [phone].     Shahnaz Waldrop         unable to assess immunization status

## 2024-06-07 NOTE — TELEPHONE ENCOUNTER
June 7, 2024    Home health orders was picked up from outbox of Dr. Crouch and sent via fax to [phone].    Shahnaz Waldrop

## 2024-06-10 ENCOUNTER — TELEPHONE (OUTPATIENT)
Dept: FAMILY MEDICINE | Facility: CLINIC | Age: 74
End: 2024-06-10
Payer: COMMERCIAL

## 2024-06-10 NOTE — TELEPHONE ENCOUNTER
Tawana 10, 2024    Home health orders was received via fax for Dr. Crouch.  Patient label was attached to paperwork and placed in provider's inbox to be signed.    Karen Smith

## 2024-06-11 DIAGNOSIS — G40.909 SEIZURE DISORDER (H): ICD-10-CM

## 2024-06-11 RX ORDER — LEVETIRACETAM 1000 MG/1
1000 TABLET ORAL EVERY EVENING
Qty: 90 TABLET | Refills: 0 | Status: ON HOLD | OUTPATIENT
Start: 2024-06-11 | End: 2024-08-07

## 2024-06-11 RX ORDER — LEVETIRACETAM 1000 MG/1
1000 TABLET ORAL EVERY EVENING
Qty: 90 TABLET | Refills: 0 | Status: SHIPPED | OUTPATIENT
Start: 2024-06-11 | End: 2024-08-23

## 2024-06-11 NOTE — TELEPHONE ENCOUNTER
[facility] Order number [id number] returned to Dr Crouch   Not legible and signature dates not matching

## 2024-06-13 ENCOUNTER — HOSPITAL ENCOUNTER (OUTPATIENT)
Dept: PET IMAGING | Facility: CLINIC | Age: 74
Discharge: HOME | End: 2024-06-13
Attending: SURGERY | Admitting: SURGERY
Payer: COMMERCIAL

## 2024-06-13 DIAGNOSIS — R93.5 ABNORMAL FINDINGS ON DIAGNOSTIC IMAGING OF OTHER ABDOMINAL REGIONS, INCLUDING RETROPERITONEUM: ICD-10-CM

## 2024-06-13 DIAGNOSIS — I72.9 PSEUDOANEURYSM (H): ICD-10-CM

## 2024-06-13 DIAGNOSIS — I71.40 ABDOMINAL AORTIC ANEURYSM (AAA) WITHOUT RUPTURE, UNSPECIFIED PART (H): ICD-10-CM

## 2024-06-13 PROCEDURE — 71260 CT THORAX DX C+: CPT

## 2024-06-13 PROCEDURE — 74177 CT ABD & PELVIS W/CONTRAST: CPT | Mod: 26 | Performed by: RADIOLOGY

## 2024-06-13 PROCEDURE — 78816 PET IMAGE W/CT FULL BODY: CPT | Mod: PI

## 2024-06-13 PROCEDURE — 71260 CT THORAX DX C+: CPT | Mod: 26 | Performed by: RADIOLOGY

## 2024-06-13 PROCEDURE — 343N000001 HC RX 343: Performed by: SURGERY

## 2024-06-13 PROCEDURE — 250N000011 HC RX IP 250 OP 636: Performed by: SURGERY

## 2024-06-13 PROCEDURE — 78816 PET IMAGE W/CT FULL BODY: CPT | Mod: 26 | Performed by: RADIOLOGY

## 2024-06-13 PROCEDURE — A9552 F18 FDG: HCPCS | Performed by: SURGERY

## 2024-06-13 RX ORDER — FLUDEOXYGLUCOSE F 18 200 MCI/ML
10-18 INJECTION, SOLUTION INTRAVENOUS ONCE
Status: COMPLETED | OUTPATIENT
Start: 2024-06-13 | End: 2024-06-13

## 2024-06-13 RX ORDER — IOPAMIDOL 755 MG/ML
10-135 INJECTION, SOLUTION INTRAVASCULAR ONCE
Status: COMPLETED | OUTPATIENT
Start: 2024-06-13 | End: 2024-06-13

## 2024-06-13 RX ADMIN — FLUDEOXYGLUCOSE F 18 10.08 MILLICURIE: 200 INJECTION, SOLUTION INTRAVENOUS at 09:37

## 2024-06-13 RX ADMIN — IOPAMIDOL 80 ML: 755 INJECTION, SOLUTION INTRAVENOUS at 10:34

## 2024-06-17 DIAGNOSIS — Z53.9 DIAGNOSIS NOT YET DEFINED: Primary | ICD-10-CM

## 2024-06-17 PROCEDURE — G0180 MD CERTIFICATION HHA PATIENT: HCPCS | Performed by: FAMILY MEDICINE

## 2024-06-17 PROCEDURE — 99207 PR MD CERTIFICATION HHA PATIENT: CPT | Performed by: FAMILY MEDICINE

## 2024-06-18 NOTE — TELEPHONE ENCOUNTER
June 18, 2024    Home health orders was picked up from outbox of Dr. Crouch and sent via fax to [phone].    Karen Smith

## 2024-06-18 NOTE — TELEPHONE ENCOUNTER
June 18, 2024    Home health orders was picked up from outbox of Dr. Crouch and sent via fax to [phone].    Karen Smith

## 2024-06-20 ENCOUNTER — OFFICE VISIT (OUTPATIENT)
Dept: FAMILY MEDICINE | Facility: CLINIC | Age: 74
End: 2024-06-20
Payer: COMMERCIAL

## 2024-06-20 VITALS
WEIGHT: 145.9 LBS | DIASTOLIC BLOOD PRESSURE: 62 MMHG | OXYGEN SATURATION: 97 % | BODY MASS INDEX: 21.61 KG/M2 | TEMPERATURE: 97.9 F | SYSTOLIC BLOOD PRESSURE: 118 MMHG | HEART RATE: 59 BPM | HEIGHT: 69 IN | RESPIRATION RATE: 16 BRPM

## 2024-06-20 DIAGNOSIS — D53.9 ANEMIA, MACROCYTIC: Primary | ICD-10-CM

## 2024-06-20 DIAGNOSIS — I67.1 SACCULAR ANEURYSM: ICD-10-CM

## 2024-06-20 DIAGNOSIS — A04.72 C. DIFFICILE COLITIS: ICD-10-CM

## 2024-06-20 DIAGNOSIS — K50.918 CROHN'S DISEASE WITH OTHER COMPLICATION, UNSPECIFIED GASTROINTESTINAL TRACT LOCATION (H): ICD-10-CM

## 2024-06-20 LAB
BASOPHILS # BLD AUTO: 0 10E3/UL (ref 0–0.2)
BASOPHILS NFR BLD AUTO: 1 %
EOSINOPHIL # BLD AUTO: 0.1 10E3/UL (ref 0–0.7)
EOSINOPHIL NFR BLD AUTO: 2 %
ERYTHROCYTE [DISTWIDTH] IN BLOOD BY AUTOMATED COUNT: 13.9 % (ref 10–15)
HCT VFR BLD AUTO: 37.1 % (ref 40–53)
HGB BLD-MCNC: 11.7 G/DL (ref 13.3–17.7)
IMM GRANULOCYTES # BLD: 0 10E3/UL
IMM GRANULOCYTES NFR BLD: 0 %
LYMPHOCYTES # BLD AUTO: 2.4 10E3/UL (ref 0.8–5.3)
LYMPHOCYTES NFR BLD AUTO: 34 %
MCH RBC QN AUTO: 31.5 PG (ref 26.5–33)
MCHC RBC AUTO-ENTMCNC: 31.5 G/DL (ref 31.5–36.5)
MCV RBC AUTO: 100 FL (ref 78–100)
MONOCYTES # BLD AUTO: 0.3 10E3/UL (ref 0–1.3)
MONOCYTES NFR BLD AUTO: 5 %
NEUTROPHILS # BLD AUTO: 4.2 10E3/UL (ref 1.6–8.3)
NEUTROPHILS NFR BLD AUTO: 59 %
PLATELET # BLD AUTO: 323 10E3/UL (ref 150–450)
RBC # BLD AUTO: 3.72 10E6/UL (ref 4.4–5.9)
WBC # BLD AUTO: 7.1 10E3/UL (ref 4–11)

## 2024-06-20 PROCEDURE — 85025 COMPLETE CBC W/AUTO DIFF WBC: CPT | Performed by: FAMILY MEDICINE

## 2024-06-20 PROCEDURE — G2211 COMPLEX E/M VISIT ADD ON: HCPCS | Performed by: FAMILY MEDICINE

## 2024-06-20 PROCEDURE — 36415 COLL VENOUS BLD VENIPUNCTURE: CPT | Performed by: FAMILY MEDICINE

## 2024-06-20 PROCEDURE — 99214 OFFICE O/P EST MOD 30 MIN: CPT | Performed by: FAMILY MEDICINE

## 2024-06-20 RX ORDER — RESPIRATORY SYNCYTIAL VIRUS VACCINE 120MCG/0.5
0.5 KIT INTRAMUSCULAR ONCE
Qty: 1 EACH | Refills: 0 | Status: CANCELLED | OUTPATIENT
Start: 2024-06-20 | End: 2024-06-20

## 2024-06-20 ASSESSMENT — PAIN SCALES - GENERAL: PAINLEVEL: NO PAIN (0)

## 2024-06-20 NOTE — PROGRESS NOTES
Assessment & Plan   Anemia, macrocytic  Though still anemic, it has improved greatly.  Stay the course.  - CBC with platelets and differential    C. difficile colitis  No symptoms while still on vancomycin.  Discussed using bleach to avoid letting it grow on surfaces and clothing.  He is feeling much better.    Saccular aneurysm  He had the PET scan which does not show inflammation in the aneurysm.  They will schedule with the vascular surgeon.  He is currently in the best shape he has been.    Crohn's disease with other complication, unspecified gastrointestinal tract location (H)  No current blood in the stool.  The PET showed diffuse colon wall thickening, but that could have been the C. Difficile.  His daughter will see if she can find out from Medicare where his last endoscopy was.  If that doesn't work, they will see if the clinic he went to most recently has it. She states she knows this will take hours, but she is willing to do this for him because the [facility] gastroenterology referral system blocks referrals without the previous colonoscopy. Luckily, he is currently relatively stable.    No longer underweight.   He is back to what he feels is his baseline.   He feels well.  He works on the OT/PT on days they are not there.  He takes short walks with his cane and goes to [place] with the help of his daughter.    While his daughter is working on the colonoscopy reports, she will try to get his immunization record from [place] also.    He will return in three months for Medicare wellness.    Stef Murray is a 73 year old, presenting for the following health issues:  Follow Up and Recheck Medication (Pt is here with his daughter and reports that he is here for follow up)      6/20/2024     9:19 AM   Additional Questions   Roomed by miki   Accompanied by daughter         6/20/2024     9:19 AM   Patient Reported Additional Medications   Patient reports taking the following new medications none  "    Saw a vascular surgeon. All are in agreement. There was concern about the aneurysm being due to infection.    History of Present Illness       Reason for visit:  Cdiff    He eats 0-1 servings of fruits and vegetables daily.He consumes 3 sweetened beverage(s) daily.He exercises with enough effort to increase his heart rate 10 to 19 minutes per day.  He exercises with enough effort to increase his heart rate 7 days per week.   He is taking medications regularly.       Objective    /62 (BP Location: Left arm, Patient Position: Sitting, Cuff Size: Adult Regular)   Pulse 59   Temp 97.9  F (36.6  C) (Tympanic)   Resp 16   Ht 1.753 m (5' 9\")   Wt 66.2 kg (145 lb 14.4 oz)   SpO2 97%   BMI 21.55 kg/m    Body mass index is 21.55 kg/m .  Physical Exam   GENERAL: healthy, alert and no distress  EYES: grossly normal to inspection, and conjunctivae and sclerae normal  RESP: breathing unlabored, no cough or throat clearing.  MS: no gross musculoskeletal defects noted. Ambulates with a cane  SKIN: no suspicious lesions or rashes visible  NEURO: Normal strength and tone, mentation intact and speech normal  PSYCH: mentation appears normal, affect normal/bright     PET CT reviewed with patient.  Office Visit on 05/10/2024   Component Date Value Ref Range Status    C Difficile Toxin B by PCR 05/11/2024 Positive (A)  Negative Final    Detection of C. difficile nucleic acid in stools confirms the presence of these organisms in diarrheal patients but may not indicate that C. difficile is the etiologic agent of the diarrhea. Results from the Xpert C. difficile assay should be interpreted in conjunction with other laboratory and clinical data available to the clinician.    Patients with a positive C. difficile PCR result will receive reflex GDH/Toxin Immunoassay testing. Please interpret the PCR test result in conjunction with GDH/Toxin Immunoassay results and the clinical status of patient.    C. difficile GDH Antigen " 05/11/2024 Positive (A)  Negative Final    C. difficile Toxin 05/11/2024 Positive (A)  Negative Final     Results for orders placed or performed in visit on 06/20/24   CBC with platelets and differential     Status: Abnormal   Result Value Ref Range    WBC Count 7.1 4.0 - 11.0 10e3/uL    RBC Count 3.72 (L) 4.40 - 5.90 10e6/uL    Hemoglobin 11.7 (L) 13.3 - 17.7 g/dL    Hematocrit 37.1 (L) 40.0 - 53.0 %     78 - 100 fL    MCH 31.5 26.5 - 33.0 pg    MCHC 31.5 31.5 - 36.5 g/dL    RDW 13.9 10.0 - 15.0 %    Platelet Count 323 150 - 450 10e3/uL    % Neutrophils 59 %    % Lymphocytes 34 %    % Monocytes 5 %    % Eosinophils 2 %    % Basophils 1 %    % Immature Granulocytes 0 %    Absolute Neutrophils 4.2 1.6 - 8.3 10e3/uL    Absolute Lymphocytes 2.4 0.8 - 5.3 10e3/uL    Absolute Monocytes 0.3 0.0 - 1.3 10e3/uL    Absolute Eosinophils 0.1 0.0 - 0.7 10e3/uL    Absolute Basophils 0.0 0.0 - 0.2 10e3/uL    Absolute Immature Granulocytes 0.0 <=0.4 10e3/uL   CBC with platelets and differential     Status: Abnormal    Narrative    The following orders were created for panel order CBC with platelets and differential.  Procedure                               Abnormality         Status                     ---------                               -----------         ------                     CBC with platelets and d...[[id number]]  Abnormal            Final result                 Please view results for these tests on the individual orders.   Signed Electronically by: CATHY FLORES MD

## 2024-07-01 ENCOUNTER — MYC REFILL (OUTPATIENT)
Dept: FAMILY MEDICINE | Facility: CLINIC | Age: 74
End: 2024-07-01

## 2024-07-01 ENCOUNTER — TELEPHONE (OUTPATIENT)
Dept: FAMILY MEDICINE | Facility: CLINIC | Age: 74
End: 2024-07-01

## 2024-07-01 ENCOUNTER — MYC REFILL (OUTPATIENT)
Dept: NEUROLOGY | Facility: CLINIC | Age: 74
End: 2024-07-01

## 2024-07-01 ENCOUNTER — OFFICE VISIT (OUTPATIENT)
Dept: VASCULAR SURGERY | Facility: CLINIC | Age: 74
End: 2024-07-01
Payer: COMMERCIAL

## 2024-07-01 ENCOUNTER — PREP FOR PROCEDURE (OUTPATIENT)
Dept: SURGERY | Facility: CLINIC | Age: 74
End: 2024-07-01

## 2024-07-01 VITALS — DIASTOLIC BLOOD PRESSURE: 55 MMHG | OXYGEN SATURATION: 97 % | HEART RATE: 69 BPM | SYSTOLIC BLOOD PRESSURE: 117 MMHG

## 2024-07-01 DIAGNOSIS — I72.8 CELIAC ARTERY ANEURYSM (H): Primary | ICD-10-CM

## 2024-07-01 DIAGNOSIS — G40.909 SEIZURE DISORDER (H): ICD-10-CM

## 2024-07-01 DIAGNOSIS — Z01.818 PRE-OP EXAM: ICD-10-CM

## 2024-07-01 DIAGNOSIS — R63.6 UNDERWEIGHT: ICD-10-CM

## 2024-07-01 DIAGNOSIS — I71.9 PSEUDOANEURYSM OF AORTA (H): ICD-10-CM

## 2024-07-01 PROCEDURE — 99214 OFFICE O/P EST MOD 30 MIN: CPT | Mod: GC | Performed by: SURGERY

## 2024-07-01 RX ORDER — LEVETIRACETAM 750 MG/1
750 TABLET ORAL EVERY MORNING
Qty: 30 TABLET | Refills: 1 | OUTPATIENT
Start: 2024-07-01

## 2024-07-01 RX ORDER — LEVETIRACETAM 1000 MG/1
1000 TABLET ORAL EVERY EVENING
Qty: 90 TABLET | Refills: 0 | Status: CANCELLED | OUTPATIENT
Start: 2024-07-01

## 2024-07-01 RX ORDER — MIDODRINE HYDROCHLORIDE 5 MG/1
10 TABLET ORAL 3 TIMES DAILY
Qty: 270 TABLET | Refills: 11 | OUTPATIENT
Start: 2024-07-01

## 2024-07-01 NOTE — TELEPHONE ENCOUNTER
July 1, 2024    [employer] Professional Verification Form was picked up from outbox of Dr. Crouch and placed at the  for pickup.    Karen Smith

## 2024-07-01 NOTE — PATIENT INSTRUCTIONS
Thank you so much for choosing us for your care. It was a pleasure to see you at the vascular clinic today.     Follow-up recommendations: We will plan for aortic repair surgery in the coming weeks. Our surgical scheduler will get in touch with you soon to determine a surgery date and will provide you with detailed pre-op instructions.    Additional testing/imaging ordered today: PAC clinic visit      Our scheduling team will get in touch with you to set up any follow-up testing/imaging and/or appointments. Please be aware that any testing/imaging recommended today will need to completed prior to your next visit with the provider. If testing/imaging is not completed prior to your next visit, your visit may be rescheduled.     If you have any questions, please contact our clinic directly at [phone] and ask for the nurse. We also encourage the use of Dashride to communicate with your healthcare provider.    If you have an urgent need after business hours (8:00 am to 4:30 pm) please call [phone], option 4, and ask for the vascular attending on call. For non-urgent after hours needs, please call the vascular clinic at [phone]. For scheduling needs, please call our clinic directly at [phone].

## 2024-07-01 NOTE — PROGRESS NOTES
"Vascular & Endovascular Surgery Clinic Return Visit   Vascular Surgeon: Shine Sprague MD, RPVI      Location:  [facility]    Trevor Larios  Medical Record #:  [id number]  YOB: 1950  Age:  73 year old     Date of Service: 7/1/2024    Primary Care Provider: Lyly Crouch    Chief Complaint/Reason for Visit: Follow up of celiac artery aneurysm    Interval History:  Mr. 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    The patient feels better now, gaining weight.  Patient feels stronger.  No worsening abdominal pain.  He is Crohn's symptoms are well-controlled with minimal diarrhea.  No acute flareups noted in recent times.    The patient's daughter reports that he has been denied to see gastroenterologist due to the lack of diagnosis at this time.      Review of Systems:    A 12 point ROS was reviewed and is negative except for symptoms noted in HPI.     Medical/Surgical History:    Past Medical History:   Diagnosis Date    Crohn's disease (H)     Dementia (H) 2023    \"moderate.\"         Past Surgical History:   Procedure Laterality Date    ORIF HIP FRACTURE  2022    SHOULDER SURGERY Right     2022?        Allergies:   No Known Allergies     Medications:    Current Outpatient Medications   Medication Sig Dispense Refill    aspirin (ASA) 81 MG chewable tablet Take 1 tablet (81 mg) by mouth every evening 90 tablet 0    cyanocobalamin (VITAMIN B-12) 500 MCG SUBL sublingual tablet Place 2 tablets (1,000 mcg) under the tongue daily 180 tablet 0    DIFICID 200 MG tablet TAKE 1 TABLET(200 MG) BY MOUTH TWICE DAILY 20 tablet 0    ferrous sulfate (FEROSUL) 325 (65 Fe) MG tablet Take 1 tablet (325 mg) by mouth daily 30 tablet 0    fidaxomicin (DIFICID) 200 MG tablet Take 1 tablet (200 mg) by mouth 2 times daily 30 tablet 0    levETIRAcetam (KEPPRA) 1000 MG tablet Take 1 tablet (1,000 mg) by mouth every evening 90 " tablet 0    levETIRAcetam (KEPPRA) 1000 MG tablet Take 1 tablet (1,000 mg) by mouth every evening 90 tablet 0    levETIRAcetam (KEPPRA) 750 MG tablet Take 1 tablet (750 mg) by mouth every morning 30 tablet 1    levETIRAcetam (KEPPRA) 750 MG tablet Take 1 tablet (750 mg) by mouth every morning 30 tablet 1    loperamide (IMODIUM A-D) 2 MG tablet Take 1 tablet (2 mg) by mouth 4 times daily as needed for diarrhea 20 tablet 2    midodrine (PROAMATINE) 5 MG tablet Take 2 tablets (10 mg) by mouth 3 times daily 270 tablet 11    multivitamin w/minerals (THERA-VIT-M) tablet Take 1 tablet by mouth daily 30 tablet 0    thiamine (B-1) 100 MG tablet Take 1 tablet (100 mg) by mouth daily 90 tablet 3    vitamin C (ASCORBIC ACID) 500 MG tablet Take 1 tablet (500 mg) by mouth daily 90 tablet 1    Vitamin D, Cholecalciferol, 25 MCG (1000 UT) CAPS Take 1,000 mcg by mouth daily 90 capsule 3     No current facility-administered medications for this visit.        Social Habits:    Tobacco Use      Smoking status: Never      Smokeless tobacco: Never       Alcohol Use: Not on file       History   Drug Use Not on file        Family History:  No family history on file.    Physical Examination:  /55 (BP Location: Left arm, Patient Position: Chair, Cuff Size: Adult Regular)   Pulse 69   SpO2 97%   Wt Readings from Last 1 Encounters:   06/20/24 66.2 kg (145 lb 14.4 oz)     There is no height or weight on file to calculate BMI.    Exam:  General: No apparent distress. Answers questions appropriately   Neurologic: Focally intact, Alert and oriented x 3.   Eyes: Grossly normal.   Cardiac: Regular rate and rhythm, palpable femorals bilaterally, palpable DP and PT pulses bilaterally  Pulmonary:  Non labored breathing with normal respiratory effort  Abdominal: Soft, non distended, non tender to palpation.  No pulsatile mass.   Musculoskeletal/Extremity: 5/5 gross lower extremity strength.  edema.  No open wounds or abrasions.  Bilateral groin  without rash or open wounds    Laboratory Findings:  Hemoglobin   Date Value Ref Range Status   06/20/2024 11.7 (L) 13.3 - 17.7 g/dL Final   04/25/2024 10.3 (L) 13.3 - 17.7 g/dL Final     WBC Count   Date Value Ref Range Status   06/20/2024 7.1 4.0 - 11.0 10e3/uL Final   04/25/2024 5.2 4.0 - 11.0 10e3/uL Final     Platelet Count   Date Value Ref Range Status   06/20/2024 323 150 - 450 10e3/uL Final   04/25/2024 510 (H) 150 - 450 10e3/uL Final     Creatinine   Date Value Ref Range Status   04/25/2024 0.92 0.67 - 1.17 mg/dL Final     Sodium   Date Value Ref Range Status   04/25/2024 140 135 - 145 mmol/L Final     Comment:     Reference intervals for this test were updated on 09/26/2023 to more accurately reflect our healthy population. There may be differences in the flagging of prior results with similar values performed with this method. Interpretation of those prior results can be made in the context of the updated reference intervals.      Glucose   Date Value Ref Range Status   04/25/2024 95 70 - 99 mg/dL Final   04/24/2024 96 70 - 99 mg/dL Final     INR   Date Value Ref Range Status   04/08/2024 1.24 (H) 0.85 - 1.15 Final         Imaging:    I personally reviewed the PET/CT from June 13 which shows no increased uptake in the aneurysm area.    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 Larios is a pleasant 73 year old male seen for intra-abdominal aorta/celiac artery aneurysm.    Today, the patient appears to be much healthier and stronger which the patient agrees.  The daughter also thinks that he has been doing much better compared to our initial evaluation.    Given overall positive progress of his overall status, we think is reasonable to proceed with the surgery to fix the aneurysm.    Given his overall functional status that is still not completely adequate for open procedure, our first recommendation is to proceed with  endovascular repair of this aneurysm using physician modified stent graft.    We explained that we will treat 2 fenestrations to stent celiac and SMA arteries and exclude celiac artery aneurysm.      We discussed the risks of the surgery including cardiac, pulmonary, renal failure, death, spinal cord ischemia (1 to 3% chance), access site complications, bowel complications, and etc.    After answering all the questions to satisfaction, the patient and family voiced understanding and agreed to proceed with the surgery.    Will plan for the surgery once we complete our preoperative workup including pre-operative anesthesia visit and necessary workup.    Recommendations/Plan:  Risk stratification/PAC visit  Scheduled for endovascular repair of aortic/celiac artery aneurysm using physician modified endograft with 2 fenestrations    Sofie Xavier MD  Vascular & Endovascular Surgery

## 2024-07-01 NOTE — NURSING NOTE
Chief Complaint   Patient presents with    Follow Up     RETURN VASCULAR PATIENT - Readdress surgery       Vitals were taken, medications reconciled.    Paige Thurner, Facilitator   12:56 PM'

## 2024-07-01 NOTE — LETTER
"7/1/2024       RE: Trevor Larios  [address]  [place] [facility] [address]       Dear Colleague,    Thank you for referring your patient, Trevor Larios, to the [facility] VASCULAR CLINIC [place] at [facility]. Please see a copy of my visit note below.    Vascular & Endovascular Surgery Clinic Return Visit   Vascular Surgeon: Shine Sprague MD, RPVI      Location:  [facility]    Trevor Larios  Medical Record #:  [id number]  YOB: 1950  Age:  73 year old     Date of Service: 7/1/2024    Primary Care Provider: Lyly Crouch    Chief Complaint/Reason for Visit: Follow up of celiac artery aneurysm    Interval History:  Mr. 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    The patient feels better now, gaining weight.  Patient feels stronger.  No worsening abdominal pain.  He is Crohn's symptoms are well-controlled with minimal diarrhea.  No acute flareups noted in recent times.    The patient's daughter reports that he has been denied to see gastroenterologist due to the lack of diagnosis at this time.      Review of Systems:    A 12 point ROS was reviewed and is negative except for symptoms noted in HPI.     Medical/Surgical History:    Past Medical History:   Diagnosis Date    Crohn's disease (H)     Dementia (H) 2023    \"moderate.\"         Past Surgical History:   Procedure Laterality Date    ORIF HIP FRACTURE  2022    SHOULDER SURGERY Right     2022?        Allergies:   No Known Allergies     Medications:    Current Outpatient Medications   Medication Sig Dispense Refill    aspirin (ASA) 81 MG chewable tablet Take 1 tablet (81 mg) by mouth every evening 90 tablet 0    cyanocobalamin (VITAMIN B-12) 500 MCG SUBL sublingual tablet Place 2 tablets (1,000 mcg) under the tongue daily 180 tablet 0    DIFICID 200 MG tablet TAKE 1 TABLET(200 MG) BY MOUTH " TWICE DAILY 20 tablet 0    ferrous sulfate (FEROSUL) 325 (65 Fe) MG tablet Take 1 tablet (325 mg) by mouth daily 30 tablet 0    fidaxomicin (DIFICID) 200 MG tablet Take 1 tablet (200 mg) by mouth 2 times daily 30 tablet 0    levETIRAcetam (KEPPRA) 1000 MG tablet Take 1 tablet (1,000 mg) by mouth every evening 90 tablet 0    levETIRAcetam (KEPPRA) 1000 MG tablet Take 1 tablet (1,000 mg) by mouth every evening 90 tablet 0    levETIRAcetam (KEPPRA) 750 MG tablet Take 1 tablet (750 mg) by mouth every morning 30 tablet 1    levETIRAcetam (KEPPRA) 750 MG tablet Take 1 tablet (750 mg) by mouth every morning 30 tablet 1    loperamide (IMODIUM A-D) 2 MG tablet Take 1 tablet (2 mg) by mouth 4 times daily as needed for diarrhea 20 tablet 2    midodrine (PROAMATINE) 5 MG tablet Take 2 tablets (10 mg) by mouth 3 times daily 270 tablet 11    multivitamin w/minerals (THERA-VIT-M) tablet Take 1 tablet by mouth daily 30 tablet 0    thiamine (B-1) 100 MG tablet Take 1 tablet (100 mg) by mouth daily 90 tablet 3    vitamin C (ASCORBIC ACID) 500 MG tablet Take 1 tablet (500 mg) by mouth daily 90 tablet 1    Vitamin D, Cholecalciferol, 25 MCG (1000 UT) CAPS Take 1,000 mcg by mouth daily 90 capsule 3     No current facility-administered medications for this visit.        Social Habits:    Tobacco Use      Smoking status: Never      Smokeless tobacco: Never       Alcohol Use: Not on file       History   Drug Use Not on file        Family History:  No family history on file.    Physical Examination:  /55 (BP Location: Left arm, Patient Position: Chair, Cuff Size: Adult Regular)   Pulse 69   SpO2 97%   Wt Readings from Last 1 Encounters:   06/20/24 66.2 kg (145 lb 14.4 oz)     There is no height or weight on file to calculate BMI.    Exam:  General: No apparent distress. Answers questions appropriately   Neurologic: Focally intact, Alert and oriented x 3.   Eyes: Grossly normal.   Cardiac: Regular rate and rhythm, palpable femorals  bilaterally, palpable DP and PT pulses bilaterally  Pulmonary:  Non labored breathing with normal respiratory effort  Abdominal: Soft, non distended, non tender to palpation.  No pulsatile mass.   Musculoskeletal/Extremity: 5/5 gross lower extremity strength.  edema.  No open wounds or abrasions.  Bilateral groin without rash or open wounds    Laboratory Findings:  Hemoglobin   Date Value Ref Range Status   06/20/2024 11.7 (L) 13.3 - 17.7 g/dL Final   04/25/2024 10.3 (L) 13.3 - 17.7 g/dL Final     WBC Count   Date Value Ref Range Status   06/20/2024 7.1 4.0 - 11.0 10e3/uL Final   04/25/2024 5.2 4.0 - 11.0 10e3/uL Final     Platelet Count   Date Value Ref Range Status   06/20/2024 323 150 - 450 10e3/uL Final   04/25/2024 510 (H) 150 - 450 10e3/uL Final     Creatinine   Date Value Ref Range Status   04/25/2024 0.92 0.67 - 1.17 mg/dL Final     Sodium   Date Value Ref Range Status   04/25/2024 140 135 - 145 mmol/L Final     Comment:     Reference intervals for this test were updated on 09/26/2023 to more accurately reflect our healthy population. There may be differences in the flagging of prior results with similar values performed with this method. Interpretation of those prior results can be made in the context of the updated reference intervals.      Glucose   Date Value Ref Range Status   04/25/2024 95 70 - 99 mg/dL Final   04/24/2024 96 70 - 99 mg/dL Final     INR   Date Value Ref Range Status   04/08/2024 1.24 (H) 0.85 - 1.15 Final         Imaging:    I personally reviewed the PET/CT from June 13 which shows no increased uptake in the aneurysm area.    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 Larios is a pleasant 73 year old male seen for intra-abdominal aorta/celiac artery aneurysm.    Today, the patient appears to be much healthier and stronger which the patient agrees.  The daughter also thinks that he has been doing much better  compared to our initial evaluation.    Given overall positive progress of his overall status, we think is reasonable to proceed with the surgery to fix the aneurysm.    Given his overall functional status that is still not completely adequate for open procedure, our first recommendation is to proceed with endovascular repair of this aneurysm using physician modified stent graft.    We explained that we will treat 2 fenestrations to stent celiac and SMA arteries and exclude celiac artery aneurysm.      We discussed the risks of the surgery including cardiac, pulmonary, renal failure, death, spinal cord ischemia (1 to 3% chance), access site complications, bowel complications, and etc.    After answering all the questions to satisfaction, the patient and family voiced understanding and agreed to proceed with the surgery.    Will plan for the surgery once we complete our preoperative workup including pre-operative anesthesia visit and necessary workup.    Recommendations/Plan:  Risk stratification/PAC visit  Scheduled for endovascular repair of aortic/celiac artery aneurysm using physician modified endograft with 2 fenestrations    Sofie Xavier MD  Vascular & Endovascular Surgery        Attestation signed by Shine Sprague MD at 8/3/2024  6:11 PM:  Patient seen and examined with Dr. Xavier, my vascular surgery fellow.  I corroborated the history and reperformed physical examination.  Agree with findings as documented in their note with the exception as documented below.    Mr. Larios is doing much better than when I saw him last. He is stronger with more energy. On exam, he looks more nourished. His abdomen is non tender.  We discussed that his PET scan did now show evidence of infection which is encouraging and would traditionally allow for more endovascular therapy.  I still do not think he is fit for open repair of his celiac aneurysm/aortic pseudoaneurysm, however, I do think he would tolerate endovascular  repair with how much better he is doing.  We discussed that this would not be a candidate for a traditional stent and would require aortic repair with a fenestrate-branched device.  We also discussed that the anatomy would not be suitable for any commercially available device, and that the repair could be performed with a physician modified stent graft, which is not FDA approved and it involved off-label use of approved devices.  I also discussed that I would be happy to refer him to a vascular surgeon colleague with access to an investigational device exemption which grants access to devices that are not commercially available but that would potentially fit him anatomy.  I also offered referral to other centers with experience with physician modified endograft experience for another opinion.  The patient would like to have the repair performed here and understands the shortcomings associated with physician modified endograft.  I shared with him data our center and from other centers comparing and describing the results of physician modified endograft with other techniques.  It is possible he would be able to undergo a two vessel repair, however, more likely would need a four vessel repair given proximity of his renal arteries to the SMA and celiac.  including but not limited to the risk of death, anesthetic or cardiopulmonary complications, bleeding, infection, myocardial infarction, stroke, renal insufficiency requiring temporary or permanent dialysis, bowel infarction necessitating bowel resection and colostomy, vessel injury, dissection, distal embolization, perforation, worsening ischemia with either compartment syndrome or limb loss and spinal cord injury.  Discussed possibility of interval aortic events including rupture.  Discussed the potential need for bank blood products, advanced directives, and the need for a team approach as well as the potential for medical photography. Discussed and consented for  possible therapeutic lumbar CSF drain if indicated. The patient and his daughter understand the risks and would like to proceed with fenestrated endovascular aortic repair with a physician modified device.            30 minutes spent on the day of encounter doing chart review from our system and care everywhere, history and exam, documentation, coordinating care, and further activities as noted with over half spent counseling.       Again, thank you for allowing me to participate in the care of your patient.      Sincerely,    Shine Sprague MD

## 2024-07-02 RX ORDER — LEVETIRACETAM 1000 MG/1
1000 TABLET ORAL EVERY EVENING
Qty: 90 TABLET | Refills: 0 | OUTPATIENT
Start: 2024-07-02

## 2024-07-02 NOTE — TELEPHONE ENCOUNTER
Patient not seen yet by provider in [facility] clinic. Previously have reached out to patients PCP (Dr. Crouch) to fill until patient is seen by Dr. Daugherty which PCP agreed to do.      Refusing refill now.

## 2024-07-03 ENCOUNTER — TELEPHONE (OUTPATIENT)
Dept: VASCULAR SURGERY | Facility: CLINIC | Age: 74
End: 2024-07-03
Payer: COMMERCIAL

## 2024-07-03 RX ORDER — LEVETIRACETAM 750 MG/1
750 TABLET ORAL EVERY MORNING
Qty: 90 TABLET | Refills: 0 | Status: ON HOLD | OUTPATIENT
Start: 2024-07-03 | End: 2024-08-07

## 2024-07-03 NOTE — TELEPHONE ENCOUNTER
7/2/24 Ирина regarding Keppra dosing.    Yes I am taking both 750mg and 1000 mg one in the morning and one at night

## 2024-07-03 NOTE — TELEPHONE ENCOUNTER
Surgery Scheduled      Surgery/Procedure: REPAIR, ANEURYSM, Celiac, THORACOABDOMINAL aorta with physician modified endograft. Possible splenic or celiac embolization.     Special Equipment:   Second room for graft modification.   Cell saver  MEP/SSEP/cMAP, . Nuvasive confirmed with Deandra caceres #[id number]  PMEG list and setup    Location: [facility] [place]:  [address] [place] [address] (Fax: [phone])    We highly recommend  parking. It is priced about the same as the parking ramp. Enter the main entrance. Take the elevators to the 3rd floor and surgery check-in will be on your left.     Date: 08/06/2024    Time: 8:30am    Admission Type: Surgery Admit    Surgeon:  Dr. Sprague    OR Confirmed & :  Yes with Rajinder on 7/3/2024    IR Tech Needed:   Yes Confirmed 07/11/24    Entered on provider calendar:  Yes    Post Op: TBD    Reps Contacted: Elvis Friedman/Jesse            Blood Thinners Addressed:  Message sent to nurse to review

## 2024-07-08 ENCOUNTER — TELEPHONE (OUTPATIENT)
Dept: FAMILY MEDICINE | Facility: CLINIC | Age: 74
End: 2024-07-08
Payer: COMMERCIAL

## 2024-07-08 NOTE — TELEPHONE ENCOUNTER
Home Health Care    Reason for call:  Verbal Orders     Orders are needed for this patient.  PT: 1x a week for 4 weeks     Pt Provider: Willa    Phone Number Homecare Nurse can be reached at: [phone]        Okay to leave a detailed message?: Yes at Other phone number:  [phone]

## 2024-07-09 NOTE — TELEPHONE ENCOUNTER
Vascular Surgery Pre-op Medication Hold Instructions  cyanocobalamin (VITAMIN B-12) 500 MCG SUBL sublingual tablet - HOLD for 7 days pre-op  ferrous sulfate (FEROSUL) 325 (65 Fe) MG tablet - HOLD for 7 days pre-op  multivitamin w/minerals (THERA-VIT-M) tablet - HOLD for 7 days pre-op  thiamine (B-1) 100 MG tablet - HOLD for 7 days pre-op  vitamin C (ASCORBIC ACID) 500 MG tablet - HOLD for 7 days pre-op  Vitamin D, Cholecalciferol, 25 MCG (1000 UT) CAPS - HOLD for 7 days pre-op  DO NOT HOLD YOUR ASPIRIN FOR ANY REASON    Medication hold information routed to jose-op scheduling team to be added into patient surgical packet.    NURA Sanchez, RN  RN Care Coordinator  [facility] Vascular Surgery - [facility] phone: [phone]  Fax: [phone]

## 2024-07-10 ENCOUNTER — OFFICE VISIT (OUTPATIENT)
Dept: OPTOMETRY | Facility: CLINIC | Age: 74
End: 2024-07-10
Payer: COMMERCIAL

## 2024-07-10 ENCOUNTER — TELEPHONE (OUTPATIENT)
Dept: VASCULAR SURGERY | Facility: CLINIC | Age: 74
End: 2024-07-10

## 2024-07-10 DIAGNOSIS — H52.4 MYOPIA WITH PRESBYOPIA OF BOTH EYES: Primary | ICD-10-CM

## 2024-07-10 DIAGNOSIS — H52.13 MYOPIA WITH PRESBYOPIA OF BOTH EYES: Primary | ICD-10-CM

## 2024-07-10 DIAGNOSIS — H57.9 EYE EXAM ABNORMAL: Primary | ICD-10-CM

## 2024-07-10 DIAGNOSIS — R09.89 OTHER SPECIFIED SYMPTOMS AND SIGNS INVOLVING THE CIRCULATORY AND RESPIRATORY SYSTEMS: ICD-10-CM

## 2024-07-10 DIAGNOSIS — H34.212 HOLLENHORST PLAQUE, LEFT EYE: ICD-10-CM

## 2024-07-10 PROCEDURE — 92015 DETERMINE REFRACTIVE STATE: CPT

## 2024-07-10 PROCEDURE — 92004 COMPRE OPH EXAM NEW PT 1/>: CPT

## 2024-07-10 ASSESSMENT — REFRACTION_MANIFEST
OD_CYLINDER: +0.25
OD_CYLINDER: SPHERE
OD_ADD: +2.50
OS_AXIS: 043
OS_ADD: +2.50
METHOD_AUTOREFRACTION: 1
OS_SPHERE: -1.75
OS_CYLINDER: +0.50
OS_AXIS: 030
OD_AXIS: 153
OD_SPHERE: -1.75
OS_SPHERE: -1.75
OS_CYLINDER: +0.75
OD_SPHERE: -1.75

## 2024-07-10 ASSESSMENT — KERATOMETRY
OS_AXISANGLE_DEGREES: 103
OD_K2POWER_DIOPTERS: 42.50
OD_AXISANGLE2_DEGREES: 159
OS_K1POWER_DIOPTERS: 42.50
OD_K1POWER_DIOPTERS: 42.25
OD_AXISANGLE_DEGREES: 69
OS_K2POWER_DIOPTERS: 43.00
OS_AXISANGLE2_DEGREES: 13

## 2024-07-10 ASSESSMENT — VISUAL ACUITY
OD_SC+: -1
OD_SC: 20/40
OS_SC: 20/80
METHOD: SNELLEN - LINEAR
OS_PH_SC+: -1
OD_SC: 20/25
OS_PH_SC: 20/40
OS_SC+: -1
OS_SC: 20/25

## 2024-07-10 ASSESSMENT — TONOMETRY
OS_IOP_MMHG: 15
OD_IOP_MMHG: 16
IOP_METHOD: TONOPEN

## 2024-07-10 ASSESSMENT — CONF VISUAL FIELD
OD_INFERIOR_NASAL_RESTRICTION: 0
OD_NORMAL: 1
OS_NORMAL: 1
OS_SUPERIOR_TEMPORAL_RESTRICTION: 0
OD_SUPERIOR_TEMPORAL_RESTRICTION: 0
OS_INFERIOR_TEMPORAL_RESTRICTION: 0
OS_SUPERIOR_NASAL_RESTRICTION: 0
OS_INFERIOR_NASAL_RESTRICTION: 0
OD_INFERIOR_TEMPORAL_RESTRICTION: 0
OD_SUPERIOR_NASAL_RESTRICTION: 0

## 2024-07-10 ASSESSMENT — CUP TO DISC RATIO
OD_RATIO: 0.4
OS_RATIO: 0.6

## 2024-07-10 ASSESSMENT — SLIT LAMP EXAM - LIDS
COMMENTS: NORMAL
COMMENTS: NORMAL

## 2024-07-10 ASSESSMENT — EXTERNAL EXAM - RIGHT EYE: OD_EXAM: NORMAL

## 2024-07-10 ASSESSMENT — EXTERNAL EXAM - LEFT EYE: OS_EXAM: NORMAL

## 2024-07-10 NOTE — LETTER
7/10/2024      Trevor Larios  [address]  [place] [facility] [address]      Dear Colleague,    Thank you for referring your patient, Trevor Larios, to the [facility]. Please see a copy of my visit note below.    Chief Complaint   Patient presents with     Annual Eye Exam      Accompanied by daughter in colt  Last Eye Exam: Years ago   Dilated Previously: Yes    What are you currently using to see?  does not use glasses or contacts       Distance Vision Acuity: Noticed gradual change in both eyes    Near Vision Acuity: Satisfied with vision while reading and using computer unaided    Eye Comfort: good  Do you use eye drops? : No      Denelle Paul - Optometric Assistant          Medical, surgical and family histories reviewed and updated 7/10/2024.       OBJECTIVE: See Ophthalmology exam    ASSESSMENT:    ICD-10-CM    1. Myopia with presbyopia of both eyes  H52.13     H52.4       2. Hollenhorst plaque, left eye  H34.212           PLAN:     Patient Instructions   Myopia with presbyopia, both eyes: Updated bifocal glasses prescription for full-time wear.  Monitor annually.  Hollenhorst plaque, left eye: Along [facility].  Patient established with vascular surgeon, Dr. Sprague.  I will follow-up with today's exam findings to consult for further work-up.      Edwige Luke, OD      Again, thank you for allowing me to participate in the care of your patient.        Sincerely,        Edwige Luke, OD

## 2024-07-10 NOTE — PROGRESS NOTES
Chief Complaint   Patient presents with    Annual Eye Exam      Accompanied by daughter in colt  Last Eye Exam: Years ago   Dilated Previously: Yes    What are you currently using to see?  does not use glasses or contacts       Distance Vision Acuity: Noticed gradual change in both eyes    Near Vision Acuity: Satisfied with vision while reading and using computer unaided    Eye Comfort: good  Do you use eye drops? : No      Denelle Paul - Optometric Assistant          Medical, surgical and family histories reviewed and updated 7/10/2024.       OBJECTIVE: See Ophthalmology exam    ASSESSMENT:    ICD-10-CM    1. Myopia with presbyopia of both eyes  H52.13     H52.4       2. Hollenhorst plaque, left eye  H34.212           PLAN:     Patient Instructions   Myopia with presbyopia, both eyes: Updated bifocal glasses prescription for full-time wear.  Monitor annually.  Hollenhorst plaque, left eye: Along [facility].  Patient established with vascular surgeon, Dr. Sprague.  I will follow-up with today's exam findings to consult for further work-up.      Edwige Luke, OD

## 2024-07-10 NOTE — PATIENT INSTRUCTIONS
Myopia with presbyopia, both eyes: Updated bifocal glasses prescription for full-time wear.  Monitor annually.  [facility]  Patient established with vascular surgeon, Dr. Sprague.  I will follow-up with today's exam findings to consult for further work-up.

## 2024-07-10 NOTE — TELEPHONE ENCOUNTER
Vascular Clinic Appointment Request    Imaging needed? Yes. Orders placed.     Visit type: No MD visit needed - carotid US only    Provider: N/A    Timeframe: ASAP    Appt notes: Please schedule pt for carotid US at his earliest convenience. Please call daughter to schedule.    New or return?: N/A    Documentation routed to clinic coordinators for scheduling.    NURA Sanchez, RN  RN Care Coordinator  [facility] Vascular Surgery - [facility] phone: [phone]  Fax: [phone]

## 2024-07-10 NOTE — Clinical Note
Anabela Sprague,  My name is Edwige; I'm an optometrist at the .  I saw Trevor this afternoon for a comprehensive eye exam.  His fundus exam was remarkable for a Hollenhorst plaque in the left eye.  There were no other significant retinal findings.    Given that he has already established care with your vascular team, I wanted to consult you about indicated labs/imaging.  Generally, we recommend lipid panel and carotid duplex urgently.  Let me know how I can assist.  Thank you, Edwige Luke, OD

## 2024-07-11 DIAGNOSIS — Z98.890 POSTOPERATIVE STATE: ICD-10-CM

## 2024-07-11 DIAGNOSIS — I71.20 ANEURYSM OF THORACIC AORTA (H): Primary | ICD-10-CM

## 2024-07-11 NOTE — TELEPHONE ENCOUNTER
FUTURE VISIT INFORMATION      SURGERY INFORMATION:  Date: 24  Location: [facility] OR  Surgeon:  Shine Sprague MD  Anesthesia Type:  General  Procedure: REPAIR, ANEURYSM, CELIAC, THORACOABDOMINAL AORTA WITH PHYSICIAN MODIFIED ENDOGRAFT. POSSIBLE SPLENIC OR CELIAC EMBOLIZATION  Consult: 24 - OV     RECORDS REQUESTED FROM:       Primary Care Provider: Lyly Crouch MD - [facility]     Pertinent Medical History: Seizure disorder; SIRS     Most recent EKG+ Tracin24    Most recent ECHO: 24

## 2024-07-11 NOTE — TELEPHONE ENCOUNTER
Reviewed medications with Zohaib and resent them via [employer]. She said an order was placed for US carotid due to eye issues. US scheduled.    PAC scheduled 07/16/2024.

## 2024-07-12 ENCOUNTER — ANCILLARY PROCEDURE (OUTPATIENT)
Dept: ULTRASOUND IMAGING | Facility: CLINIC | Age: 74
End: 2024-07-12
Attending: SURGERY
Payer: COMMERCIAL

## 2024-07-12 DIAGNOSIS — R09.89 OTHER SPECIFIED SYMPTOMS AND SIGNS INVOLVING THE CIRCULATORY AND RESPIRATORY SYSTEMS: ICD-10-CM

## 2024-07-12 DIAGNOSIS — H57.9 EYE EXAM ABNORMAL: ICD-10-CM

## 2024-07-12 PROCEDURE — 93880 EXTRACRANIAL BILAT STUDY: CPT | Performed by: RADIOLOGY

## 2024-07-15 ENCOUNTER — TELEPHONE (OUTPATIENT)
Dept: FAMILY MEDICINE | Facility: CLINIC | Age: 74
End: 2024-07-15
Payer: COMMERCIAL

## 2024-07-15 ENCOUNTER — MEDICAL CORRESPONDENCE (OUTPATIENT)
Dept: HEALTH INFORMATION MANAGEMENT | Facility: CLINIC | Age: 74
End: 2024-07-15

## 2024-07-15 LAB
ABO/RH(D): NORMAL
ANTIBODY SCREEN: NEGATIVE
SPECIMEN EXPIRATION DATE: NORMAL

## 2024-07-15 NOTE — TELEPHONE ENCOUNTER
July 15, 2024    Home health orders was received via fax for Dr. Crouch.  Patient label was attached to paperwork and placed in provider's inbox to be signed.    Karen Smith

## 2024-07-16 ENCOUNTER — LAB (OUTPATIENT)
Dept: LAB | Facility: CLINIC | Age: 74
End: 2024-07-16
Payer: COMMERCIAL

## 2024-07-16 ENCOUNTER — PRE VISIT (OUTPATIENT)
Dept: SURGERY | Facility: CLINIC | Age: 74
End: 2024-07-16

## 2024-07-16 ENCOUNTER — OFFICE VISIT (OUTPATIENT)
Dept: SURGERY | Facility: CLINIC | Age: 74
End: 2024-07-16
Payer: COMMERCIAL

## 2024-07-16 ENCOUNTER — ANESTHESIA EVENT (OUTPATIENT)
Dept: SURGERY | Facility: CLINIC | Age: 74
DRG: 269 | End: 2024-07-16
Payer: COMMERCIAL

## 2024-07-16 VITALS
DIASTOLIC BLOOD PRESSURE: 75 MMHG | HEIGHT: 69 IN | TEMPERATURE: 97.8 F | OXYGEN SATURATION: 97 % | WEIGHT: 151.6 LBS | HEART RATE: 70 BPM | SYSTOLIC BLOOD PRESSURE: 124 MMHG | RESPIRATION RATE: 16 BRPM | BODY MASS INDEX: 22.45 KG/M2

## 2024-07-16 DIAGNOSIS — Z01.818 PRE-OP EXAM: ICD-10-CM

## 2024-07-16 DIAGNOSIS — Z01.818 PRE-OP EVALUATION: ICD-10-CM

## 2024-07-16 DIAGNOSIS — Z01.818 PRE-OP EVALUATION: Primary | ICD-10-CM

## 2024-07-16 DIAGNOSIS — R79.1 ABNORMAL INR: ICD-10-CM

## 2024-07-16 DIAGNOSIS — I72.8 CELIAC ARTERY ANEURYSM (H): ICD-10-CM

## 2024-07-16 DIAGNOSIS — I25.10 CORONARY ARTERY CALCIFICATION: ICD-10-CM

## 2024-07-16 LAB
ANION GAP SERPL CALCULATED.3IONS-SCNC: 10 MMOL/L (ref 7–15)
BUN SERPL-MCNC: 21.5 MG/DL (ref 8–23)
CALCIUM SERPL-MCNC: 10.3 MG/DL (ref 8.8–10.4)
CHLORIDE SERPL-SCNC: 101 MMOL/L (ref 98–107)
CREAT SERPL-MCNC: 0.93 MG/DL (ref 0.67–1.17)
EGFRCR SERPLBLD CKD-EPI 2021: 86 ML/MIN/1.73M2
ERYTHROCYTE [DISTWIDTH] IN BLOOD BY AUTOMATED COUNT: 14.5 % (ref 10–15)
GLUCOSE SERPL-MCNC: 94 MG/DL (ref 70–99)
HCO3 SERPL-SCNC: 28 MMOL/L (ref 22–29)
HCT VFR BLD AUTO: 39 % (ref 40–53)
HGB BLD-MCNC: 12.6 G/DL (ref 13.3–17.7)
MCH RBC QN AUTO: 31.3 PG (ref 26.5–33)
MCHC RBC AUTO-ENTMCNC: 32.3 G/DL (ref 31.5–36.5)
MCV RBC AUTO: 97 FL (ref 78–100)
PLATELET # BLD AUTO: 293 10E3/UL (ref 150–450)
POTASSIUM SERPL-SCNC: 4.9 MMOL/L (ref 3.4–5.3)
RBC # BLD AUTO: 4.02 10E6/UL (ref 4.4–5.9)
SODIUM SERPL-SCNC: 139 MMOL/L (ref 135–145)
WBC # BLD AUTO: 6.3 10E3/UL (ref 4–11)

## 2024-07-16 PROCEDURE — 85027 COMPLETE CBC AUTOMATED: CPT | Performed by: PATHOLOGY

## 2024-07-16 PROCEDURE — 80048 BASIC METABOLIC PNL TOTAL CA: CPT | Performed by: PATHOLOGY

## 2024-07-16 PROCEDURE — 99205 OFFICE O/P NEW HI 60 MIN: CPT | Performed by: PHYSICIAN ASSISTANT

## 2024-07-16 PROCEDURE — 36415 COLL VENOUS BLD VENIPUNCTURE: CPT | Performed by: PATHOLOGY

## 2024-07-16 PROCEDURE — 86900 BLOOD TYPING SEROLOGIC ABO: CPT | Performed by: PHYSICIAN ASSISTANT

## 2024-07-16 PROCEDURE — 99417 PROLNG OP E/M EACH 15 MIN: CPT | Performed by: PHYSICIAN ASSISTANT

## 2024-07-16 RX ORDER — CALCIUM CARBONATE 500 MG/1
1 TABLET, CHEWABLE ORAL 2 TIMES DAILY PRN
COMMUNITY

## 2024-07-16 ASSESSMENT — PAIN SCALES - GENERAL: PAINLEVEL: NO PAIN (0)

## 2024-07-16 ASSESSMENT — ENCOUNTER SYMPTOMS: SEIZURES: 1

## 2024-07-16 ASSESSMENT — COPD QUESTIONNAIRES: COPD: 0

## 2024-07-16 NOTE — PATIENT INSTRUCTIONS
Preparing for Your Surgery      Name:  Trevor Larios   MRN:  [id number]   :  1950   Today's Date:  2024       Arriving for surgery:  Surgery date:  2024  Arrival time:  6:30 am    Please come to:           [facility]    [address] [place]  [address]     The [facility] [place] Patient/Visitor Ramp is at [address]. Patients and visitors who self-park will receive the reduced hospital parking rate. If the Patient /Visitor Ramp is full, please follow the signs to the [employer] car park located at the main hospital entrance.       parking is available (24 hours/ 7 days a week)      Discounted parking pass options are available for patients and visitors. They can be purchased at the [employer] desk at the [facility] hospital entrance.     -    Stop at the security desk and they will direct surgery patients to the [facility] Check [facility] [phone]        - If you need directions, a wheelchair or an escort please stop at the Information/security desk in the lobby.     What can I eat or drink?  -  You may eat and drink normally up to 8 hours prior to arrival time. (Until 24 at 10 pm)  -  You may have clear liquids until 2 hours prior to arrival time. (Until 4:30 am)    Examples of clear liquids:  Water  Clear broth  Juices (apple, white grape, white cranberry  and cider) without pulp  Noncarbonated, powder based beverages  (lemonade and Nick-Aid)  Sodas (Sprite, 7-Up, ginger ale and seltzer)  Coffee or tea (without milk or cream)  Gatorade    -  No Alcohol or cannabis products for at least 24 hours before surgery.     Which medicines can I take?    Hold Multivitamins for 7 days before surgery.  Hold Supplements for 7 days before surgery.  Hold Ibuprofen (Advil, Motrin) for  day(s) before surgery--unless otherwise directed by surgeon.  Hold Naproxen (Aleve) for 4  days before surgery.    -  DO NOT take these medications the day of surgery:  Vitamin B 12  Ferrous sulfate   Thiamine  Vitamin C  Vitamin D      -  PLEASE TAKE these medications the day of surgery:  Aspirin   Levetiracetam  Midodrine  Loperamide (Imodium) if needed       How do I prepare myself?  - Please take 2 showers (one the night prior to surgery and one the morning of surgery) using Scrubcare or Hibiclens soap.    Use this soap only from the neck to your toes.     Leave the soap on your skin for one minute--then rinse thoroughly.      You may use your own shampoo and conditioner. No other hair products.   - Please remove all jewelry and body piercings.  - No lotions, deodorants or fragrance.  - No makeup or fingernail polish.   - Bring your ID and insurance card.    -If you use a CPAP machine, please bring the CPAP machine, tubing, and mask to hospital.    -If you have a Deep Brain Stimulator, Spinal Cord Stimulator, or any Neuro Stimulator device---you must bring the remote control to the hospital.      ALL PATIENTS GOING HOME THE SAME DAY OF SURGERY ARE REQUIRED TO HAVE A RESPONSIBLE ADULT TO DRIVE AND BE IN ATTENDANCE WITH THEM FOR 24 HOURS FOLLOWING SURGERY.    Covid testing policy as of 12/06/2022  Your surgeon will notify and schedule you for a COVID test if one is needed before surgery--please direct any questions or COVID symptoms to your surgeon      Questions or Concerns:    - For any questions regarding the day of surgery or your hospital stay, please contact the Pre Admission Nursing Office at [phone].       - If you have health changes between today and your surgery, please call your surgeon.       - For questions after surgery, please call your surgeons office.     We can be reached at [phone] Monday-Friday 8 am-4:30 pm if you have any questions.              Current Visitor Guidelines    You may have 2 visitors in the pre op area.    Visiting hours: 8 a.m. to 8:30 p.m.    Patients  confirmed or suspected to have symptoms of COVID 19 or flu:     No visitors allowed for adult patients.   Children (under age 18) can have 1 named visitor.     People who are sick or showing symptoms of COVID 19 or flu:    Are not allowed to visit patients--we can only make exceptions in special situations.       Please follow these guidelines for your visit:          Please maintain social distance          Masking is optional--however at times you may be asked to wear a mask for the safety of yourself and others     Clean your hands with alcohol hand . Do this when you arrive at and leave the building and patient room,    And again after you touch your mask or anything in the room.     Go directly to and from the room you are visiting.     Stay in the patient s room during your visit. Limit going to other places in the hospital as much as possible     Leave bags and jackets at home or in the car.     For everyone s health, please don t come and go during your visit. That includes for smoking   during your visit.

## 2024-07-16 NOTE — H&P
Pre-Operative H & P     CC:  Preoperative exam to assess for increased cardiopulmonary risk while undergoing surgery and anesthesia.    Date of Encounter: 7/16/2024  Primary Care Physician:  Lyly Crouch     Reason for visit:   Encounter Diagnoses   Name Primary?    Pre-op evaluation Yes    Celiac artery aneurysm (H24)     Pre-op exam        HPI  Trevor Larios is a 73 year old male who presents for pre-operative H & P in preparation for  Procedure Information       Case: [id number] Date/Time: 08/06/24 0830    Procedure: REPAIR, ANEURYSM, Celiac, THORACOABDOMINAL aorta with physician modified endograft. 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    He has a history of celiac artery and aortic aneurysms that were initially evaluated while he was in [facility]. He moved to [place] several months ago due to cognitive impairment and multiple medical needs. He was evaluated by Dr. Sprague on 4/29 and due to his numerous comorbidities and significant frailty, was felt to be very high risk for surgery. He completed a follow up visit on 7/1 with Dr. Sprague and was noted to be significantly improving from a frailty/health standpoint and the above surgery was recommended for further management.    He presents to PAC today for a pre-operative assessment due to the above medical comorbidities.     History is obtained from the patient, patient's son-in-law (Luis Felipe), and chart review.    Hx of abnormal bleeding or anti-platelet use: ASA 81 mg      Past Medical History  Past Medical History:   Diagnosis Date    Anemia     Celiac  "artery aneurysm (H24)     Coronary artery calcification     Crohn's disease (H)     Dementia (H) 2023    \"moderate.\"    Seizure disorder (H)        Past Surgical History  Past Surgical History:   Procedure Laterality Date    COLONOSCOPY      ORIF HIP FRACTURE  2022    SHOULDER SURGERY Right     2022?    TOE SURGERY         Prior to Admission Medications  Current Outpatient Medications   Medication Sig Dispense Refill    aspirin (ASA) 81 MG chewable tablet Take 1 tablet (81 mg) by mouth every evening 90 tablet 0    calcium carbonate (TUMS) 500 MG chewable tablet Take 1 chew tab by mouth 2 times daily as needed for heartburn      cyanocobalamin (VITAMIN B-12) 500 MCG SUBL sublingual tablet Place 2 tablets (1,000 mcg) under the tongue daily 180 tablet 0    ferrous sulfate (FEROSUL) 325 (65 Fe) MG tablet Take 1 tablet (325 mg) by mouth daily 30 tablet 0    levETIRAcetam (KEPPRA) 1000 MG tablet Take 1 tablet (1,000 mg) by mouth every evening 90 tablet 0    levETIRAcetam (KEPPRA) 1000 MG tablet Take 1 tablet (1,000 mg) by mouth every evening 90 tablet 0    levETIRAcetam (KEPPRA) 750 MG tablet Take 1 tablet (750 mg) by mouth every morning 90 tablet 0    levETIRAcetam (KEPPRA) 750 MG tablet Take 1 tablet (750 mg) by mouth every morning 30 tablet 1    loperamide (IMODIUM A-D) 2 MG tablet Take 1 tablet (2 mg) by mouth 4 times daily as needed for diarrhea 20 tablet 2    midodrine (PROAMATINE) 5 MG tablet Take 2 tablets (10 mg) by mouth 3 times daily 270 tablet 11    multivitamin w/minerals (THERA-VIT-M) tablet Take 1 tablet by mouth daily 30 tablet 0    thiamine (B-1) 100 MG tablet Take 1 tablet (100 mg) by mouth daily 90 tablet 3    vitamin C (ASCORBIC ACID) 500 MG tablet Take 1 tablet (500 mg) by mouth daily 90 tablet 1    Vitamin D, Cholecalciferol, 25 MCG (1000 UT) CAPS Take 1,000 mcg by mouth daily 90 capsule 3       Allergies  No Known Allergies    Social History  Social History     Socioeconomic History    Marital " status:      Spouse name: Not on file    Number of children: Not on file    Years of education: Not on file    Highest education level: Not on file   Occupational History    Not on file   Tobacco Use    Smoking status: Former     Current packs/day: 0.00     Types: Cigarettes     Quit date:      Years since quittin.5    Smokeless tobacco: Never   Vaping Use    Vaping status: Never Used   Substance and Sexual Activity    Alcohol use: Not Currently    Drug use: Not Currently    Sexual activity: Not on file   Other Topics Concern    Not on file   Social History Narrative    Not on file     Social Determinants of Health     Financial Resource Strain: Unknown (3/15/2024)    Financial Resource Strain     Within the past 12 months, have you or your family members you live with been unable to get utilities (heat, electricity) when it was really needed?: Patient declined   Food Insecurity: Unknown (3/15/2024)    Food Insecurity     Within the past 12 months, did you worry that your food would run out before you got money to buy more?: Patient declined     Within the past 12 months, did the food you bought just not last and you didn t have money to get more?: Patient declined   Transportation Needs: Unknown (3/15/2024)    Transportation Needs     Within the past 12 months, has lack of transportation kept you from medical appointments, getting your medicines, non-medical meetings or appointments, work, or from getting things that you need?: Patient declined   Physical Activity: Not on file   Stress: Not on file   Social Connections: Not on file   Interpersonal Safety: Low Risk  (2024)    Interpersonal Safety     Do you feel physically and emotionally safe where you currently live?: Yes     Within the past 12 months, have you been hit, slapped, kicked or otherwise physically hurt by someone?: No     Within the past 12 months, have you been humiliated or emotionally abused in other ways by your partner or  ex-partner?: No   Housing Stability: Unknown (3/15/2024)    Housing Stability     Do you have housing? : Patient declined     Are you worried about losing your housing?: Patient declined       Family History  Family History   Problem Relation Age of Onset    Anesthesia Reaction No family hx of     Venous thrombosis No family hx of        Review of Systems  The complete review of systems is negative other than noted in the HPI or here.   Anesthesia Evaluation   Pt has had prior anesthetic.     No history of anesthetic complications       ROS/MED HX  ENT/Pulmonary: Comment: Possible ILD    (+)                              recent URI, resolved, pneumonia 4/204:     (-) asthma, COPD and ART risk factors   Neurologic:     (+)   dementia,    seizures, last seizure: April 2024, features: tonic-clonic,                    (-) no CVA, no TIA and migraines   Cardiovascular: Comment: Celiac artery aneurysm    (+)  - -  CAD -  - -                          Irregular Heartbeat/Palpitations,       Previous cardiac testing   Echo: Date: 4/8/24 Results:  Interpretation Summary  The visual ejection fraction is 55-60%. Relative wall thickness is increased  consistent with concentric remodeling.  No regional wall motion abnormalities are seen.  Right ventricular function, chamber size, wall motion, and thickness are  normal.  Severe biatrial enlargement is present.  Trace to mild aortic insufficiency is present.  All other valves are normal.  IVC diameter <2.1 cm collapsing >50% with sniff suggests a normal RA pressure  of 3 mmHg.  No pericardial effusion is present.  There is no prior study for direct comparison.    Stress Test:  Date: Results:    ECG Reviewed:  Date: 4/24/24 Results:  Sinus rhythm with Premature atrial complexes with Aberrant conduction  Otherwise normal ECG  Cath:  Date: Results:      METS/Exercise Tolerance: 3 - Able to walk 1-2 blocks without stopping Comment: Denies chest pain/pressure, RAMIREZ, orthopnea, dizziness,  "palpitations, edema.    Hematologic:  - neg hematologic  ROS     Musculoskeletal: Comment: Hx ORIF L hip  Hx right shoulder surgery      GI/Hepatic:     (+) GERD (PRN Tums), Other,     Inflammatory bowel disease,          (-) liver disease   Renal/Genitourinary:  - neg Renal ROS     Endo:  - neg endo ROS     Psychiatric/Substance Use:  - neg psychiatric ROS     Infectious Disease: Comment: Hx of c.diff   (-) Recent Fever   Malignancy:  - neg malignancy ROS     Other:  - neg other ROS          /75 (BP Location: Right arm, Patient Position: Sitting, Cuff Size: Adult Regular)   Pulse 70   Temp 97.8  F (36.6  C) (Oral)   Resp 16   Ht 1.753 m (5' 9\")   Wt 68.8 kg (151 lb 9.6 oz)   SpO2 97%   BMI 22.39 kg/m      Physical Exam   Constitutional: Pleasant, well-appearing, no apparent distress, and appears stated age.  Eyes: Pupils equal, round and reactive to light, extra ocular muscles intact, sclera clear, conjunctiva normal.  HENT: Normocephalic and atraumatic, oral pharynx with moist mucus membranes, poor dentition. No goiter appreciated.   Respiratory: Clear to auscultation bilaterally, no crackles or wheezing.  Cardiovascular: Regular rate and rhythm, normal S1 and S2, and no murmur noted.  Carotids +2, no bruits. No edema. Palpable pulses to radial  DP and PT arteries.   GI: Normal bowel sounds, soft, non-distended, non-tender, no masses palpated, no hepatosplenomegaly.  Lymph/Hematologic: No cervical lymphadenopathy and no supraclavicular lymphadenopathy.  Genitourinary:  Deferred  Skin: Warm and dry.  No rashes on exposed skin   Musculoskeletal: Limited ROM of neck. There is no redness, warmth, or swelling of the visible joints. Gross motor strength is normal.    Neurologic: Awake, alert, oriented to name, place and time. Cranial nerves II-XII are grossly intact. Gait is normal.   Neuropsychiatric: Calm, cooperative. Normal affect.       Prior Labs/Diagnostic Studies   All labs and imaging personally " reviewed     EKG/ stress test - if available please see in ROS above   Echo result w/o MOPS: Interpretation SummaryThe visual ejection fraction is 55-60%. Relative wall thickness is increasedconsistent with concentric remodeling.No regional wall motion abnormalities are seen.Right ventricular function, chamber size, wall motion, and thickness arenormal.Severe biatrial enlargement is present.Trace to mild aortic insufficiency is present.All other valves are normal.IVC diameter <2.1 cm collapsing >50% with sniff suggests a normal RA pressureof 3 mmHg.No pericardial effusion is present.There is no prior study for direct comparison.    The patient's records and results personally reviewed by this provider.     Outside records reviewed from: Care Everywhere    LAB/DIAGNOSTIC STUDIES TODAY:  CBC, BMP, T&S    Assessment  Trevor Larios is a 73 year old male seen as a PAC referral for risk assessment and optimization for anesthesia.    Plan/Recommendations  Pt will be optimized for the proposed procedure.  See below for details on the assessment, risk, and preoperative recommendations    NEUROLOGY  - History of Seizure Disorder  Last seizure, April 2024 due to missed medication doses. Has been on Keppra since without breakthrough seizures. Continue as scheduled. Has follow up with neurology in October.   - Cognitive Impairment  Secondary to dementia  Daughter, Rylee is power of   - No history of TIA, CVA    -Post Op delirium risk factors:  Age and History of pre-existing cognitive dysfunction    ENT  - No current airway concerns.  Will need to be reassessed day of surgery.  Mallampati: I  TM: > 3  Poor dentition, thick beard    [Addendum]  Last dental visit ~ March 2024 per daughter. She states that they did a cleaning and removed one of his teeth.  Heidi Campos PA-C on 7/17/2024 at 11:54 AM    CARDIAC  - No history of Hypertension and Afib  - Hypotension  Continue Midodrine  - Coronary artery calcification,  "history of acute myocardial injury/demand ischemia  Patient underwent cardiac eval during admission in early April for elevated troponin. Cards felt noncardiac troponin elevation due to no cardiac symptoms, no ischemic changes on EKG. Rise/fall of troponin likely due to acute myocardial injury in the setting of possible demand ischemia from seizure, anemia, hypoxia.   Coronary calcification noted on CT (LAD and Lcx territories). Echo while admitted revealed normal biventricular function without wall motion abnormalities.  Outpatient follow up recommended but has not yet been completed. Will order stress test prior to surgery to further evaluate.     - METS (Metabolic Equivalents)  Patient CANNOT perform 4 METS exercise without symptoms             Total Score: 1    Functional Capacity: Unable to complete 4 METS      RCRI-Moderate risk: Class 3  6.6% complication rate             Total Score: 2    RCRI: High Risk Surgery    RCRI: CAD        PULMONARY  - Possible chronic fibrotic ILD  Noted on chart review/CT chest - GGO and septal thickening with diffuse subpleural cysts. No prior hx of chronic lung disease. Patient denies history of chronic lung disease pr respiratory concerns today and lungs CTA on exam.   - Pnuemonia 4/2024  Treated with antibiotics due to hypoxia and bilateral lower lobe infiltrates. No recurrence.    ART Low Risk             Total Score: 2    ART: Over 50 ys old    ART: Male      - Denies asthma or inhaler use  - Tobacco History    History   Smoking Status    Former    Types: Cigarettes   Smokeless Tobacco    Never       GI  - GERD  Using PRN Tums  PONV Medium Risk  Total Score: 2           1 AN PONV: Patient is not a current smoker    1 AN PONV: Intended Post Op Opioids        /RENAL  - Baseline Creatinine  0.92    ENDOCRINE    - BMI: Estimated body mass index is 22.39 kg/m  as calculated from the following:    Height as of this encounter: 1.753 m (5' 9\").    Weight as of this encounter: 68.8 " "kg (151 lb 9.6 oz).  Healthy Weight (BMI 18.5-24.9)  - No history of Diabetes Mellitus    HEME  VTE Low Risk 0.5%             Total Score: 3    VTE: Greater than 59 yrs old    VTE: Male      - Platelet dysfunction secondary to Aspirin (Marv, many others). Continue per surgical team.   - Chronic anemia  Recommend perioperative use of blood conservation techniques intraoperatively and close monitoring for postoperative bleeding.  A type and screen has been ordered for this patient    MSK  - s/p ORIF left hip  - hx of right shoulder surgery  - recommend consideration for careful positioning to limit patient discomfort.    Different anesthesia methods/types have been discussed with the patient, but they are aware that the final plan will be decided by the assigned anesthesia provider on the date of service.    Patient was seen and discussed with Aldo Valenzuela (medical student) who discussed the patient with Dr Malagon    The patient is not yet optimized for their procedure - dobutamine stress echo and cardiology evaluation recommended prior to surgery.     AVS with information on surgery time/arrival time, meds and NPO status given by nursing staff. No further diagnostic testing indicated.      On the day of service:     Prep time: 30 minutes  Visit time: 30 minutes  Documentation time: 30 minutes  ------------------------------------------  Total time: 90 minutes      Heidi Campos PA-C  Preoperative Assessment Center  [facility]  Phone: [phone]  Fax: [phone]    [ADDENDUM] Patient completed DSE and cardiology evaluation. Per cardiology note \"his cardiac condition is optimized for endovascular intervention and no further cardiac testing is necessary from my point of view.\" Patient is now optimized for planned procedure.  Heidi Campos PA-C on 7/24/2024 at 9:52 AM      "

## 2024-07-18 ENCOUNTER — HOSPITAL ENCOUNTER (OUTPATIENT)
Dept: CARDIOLOGY | Facility: CLINIC | Age: 74
Discharge: HOME | End: 2024-07-18
Attending: PHYSICIAN ASSISTANT | Admitting: PHYSICIAN ASSISTANT
Payer: COMMERCIAL

## 2024-07-18 DIAGNOSIS — I72.8 CELIAC ARTERY ANEURYSM (H): ICD-10-CM

## 2024-07-18 DIAGNOSIS — Z01.818 PRE-OP EVALUATION: ICD-10-CM

## 2024-07-18 DIAGNOSIS — I25.10 CORONARY ARTERY CALCIFICATION: ICD-10-CM

## 2024-07-18 PROCEDURE — 250N000011 HC RX IP 250 OP 636: Performed by: INTERNAL MEDICINE

## 2024-07-18 PROCEDURE — 93321 DOPPLER ECHO F-UP/LMTD STD: CPT | Mod: 26 | Performed by: INTERNAL MEDICINE

## 2024-07-18 PROCEDURE — 93325 DOPPLER ECHO COLOR FLOW MAPG: CPT | Mod: 26 | Performed by: INTERNAL MEDICINE

## 2024-07-18 PROCEDURE — 93016 CV STRESS TEST SUPVJ ONLY: CPT | Performed by: INTERNAL MEDICINE

## 2024-07-18 PROCEDURE — 93018 CV STRESS TEST I&R ONLY: CPT | Performed by: INTERNAL MEDICINE

## 2024-07-18 PROCEDURE — 250N000009 HC RX 250: Performed by: INTERNAL MEDICINE

## 2024-07-18 PROCEDURE — 255N000002 HC RX 255 OP 636: Performed by: INTERNAL MEDICINE

## 2024-07-18 PROCEDURE — 93350 STRESS TTE ONLY: CPT | Mod: 26 | Performed by: INTERNAL MEDICINE

## 2024-07-18 PROCEDURE — 258N000003 HC RX IP 258 OP 636: Performed by: INTERNAL MEDICINE

## 2024-07-18 PROCEDURE — 999N000128 HC STATISTIC PERIPHERAL IV START W/O US GUIDANCE

## 2024-07-18 RX ORDER — ATROPINE SULFATE 0.4 MG/ML
.2-.4 AMPUL (ML) INJECTION
Status: DISCONTINUED | OUTPATIENT
Start: 2024-07-18 | End: 2024-07-19 | Stop reason: HOSPADM

## 2024-07-18 RX ORDER — SODIUM CHLORIDE 9 MG/ML
INJECTION, SOLUTION INTRAVENOUS CONTINUOUS
Status: SHIPPED | OUTPATIENT
Start: 2024-07-18 | End: 2024-07-18

## 2024-07-18 RX ORDER — DOBUTAMINE HYDROCHLORIDE 200 MG/100ML
10-50 INJECTION INTRAVENOUS CONTINUOUS
Status: ACTIVE | OUTPATIENT
Start: 2024-07-18 | End: 2024-07-18

## 2024-07-18 RX ORDER — METOPROLOL TARTRATE 1 MG/ML
1-10 INJECTION, SOLUTION INTRAVENOUS
Status: ACTIVE | OUTPATIENT
Start: 2024-07-18 | End: 2024-07-18

## 2024-07-18 RX ORDER — LIDOCAINE 40 MG/G
CREAM TOPICAL
Status: DISCONTINUED | OUTPATIENT
Start: 2024-07-18 | End: 2024-07-19 | Stop reason: HOSPADM

## 2024-07-18 RX ADMIN — PERFLUTREN 4 ML: 6.52 INJECTION, SUSPENSION INTRAVENOUS at 12:12

## 2024-07-18 RX ADMIN — DEXTROSE MONOHYDRATE 10 MCG/KG/MIN: 50 INJECTION, SOLUTION INTRAVENOUS at 11:52

## 2024-07-18 RX ADMIN — METOPROLOL TARTRATE 2 MG: 5 INJECTION INTRAVENOUS at 12:10

## 2024-07-18 NOTE — PROGRESS NOTES
Pt here for dobutamine stress test. Test, meds and side effects reviewed with patient. Achieved target HR at 50 mcg Dobutamine and a total of 0 mg IV atropine. Gave a total of 2 mg IV Metoprolol to bring HR back to baseline. Post monitoring complete and VSS. Pt escorted out to the gold waiting room.

## 2024-07-23 ENCOUNTER — OFFICE VISIT (OUTPATIENT)
Dept: CARDIOLOGY | Facility: CLINIC | Age: 74
End: 2024-07-23
Attending: PHYSICIAN ASSISTANT
Payer: COMMERCIAL

## 2024-07-23 VITALS
HEART RATE: 62 BPM | WEIGHT: 153.5 LBS | OXYGEN SATURATION: 99 % | BODY MASS INDEX: 22.74 KG/M2 | DIASTOLIC BLOOD PRESSURE: 72 MMHG | HEIGHT: 69 IN | SYSTOLIC BLOOD PRESSURE: 116 MMHG

## 2024-07-23 DIAGNOSIS — Z01.818 PRE-OP EVALUATION: ICD-10-CM

## 2024-07-23 DIAGNOSIS — I72.8 CELIAC ARTERY ANEURYSM (H): ICD-10-CM

## 2024-07-23 DIAGNOSIS — I25.10 CORONARY ARTERY CALCIFICATION: ICD-10-CM

## 2024-07-23 PROCEDURE — 99204 OFFICE O/P NEW MOD 45 MIN: CPT | Performed by: INTERNAL MEDICINE

## 2024-07-23 NOTE — PROGRESS NOTES
HPI and Plan:   It is my pleasure to see this pleasant 73-year-old gentleman who is accompanied by his daughter.  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  I do see that this is a gentleman with dementia seizure disorder chronic anemia interstitial lung disease.  He is known to have coronary artery calcification.    In April 2024 he was assessed by the cardiology team at [facility] for small troponin elevation thought due to possible demand ischemia.  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  The patient himself is alert and able to give full details of his history of his medical conditions.  His daughter does say that they plan to do this via an endovascular approach    He has no chest pains denies shortness of breath    Cardiac examination reveals unremarkable heart sounds normal JVP no significant peripheral edema.  His chest sounds surprisingly clear.  His blood pressure is excellent at 116/72.    EKG done in April of this year shows a sinus rhythm occasional PVCs but no other major changes    I reviewed his carotid ultrasound done recently and there is no evidence of significant obstructive disease    I am reassured to see that her recent dobutamine echocardiogram done at [facility] shows preserved left ventricular systolic function and no evidence of inducible ischemia    In summary this is a gentleman with no symptoms of angina or heart failure, coronary calcification and normal stress testing    As such I think his cardiac condition is optimized for endovascular intervention and no further cardiac testing is necessary from my point of view    I wished him well for his procedure.  Further follow-up can be with my  colleagues in cardiology at [facility] if considered necessary.      No orders of the defined types were placed in this encounter.      No orders of the defined types were placed in this encounter.      Encounter Diagnoses   Name Primary?    Pre-op evaluation     Celiac artery aneurysm (H24)     Coronary artery calcification        CURRENT MEDICATIONS:  Current Outpatient Medications   Medication Sig Dispense Refill    aspirin (ASA) 81 MG chewable tablet Take 1 tablet (81 mg) by mouth every evening 90 tablet 0    calcium carbonate (TUMS) 500 MG chewable tablet Take 1 chew tab by mouth 2 times daily as needed for heartburn      cyanocobalamin (VITAMIN B-12) 500 MCG SUBL sublingual tablet Place 2 tablets (1,000 mcg) under the tongue daily 180 tablet 0    ferrous sulfate (FEROSUL) 325 (65 Fe) MG tablet Take 1 tablet (325 mg) by mouth daily 30 tablet 0    levETIRAcetam (KEPPRA) 1000 MG tablet Take 1 tablet (1,000 mg) by mouth every evening 90 tablet 0    levETIRAcetam (KEPPRA) 750 MG tablet Take 1 tablet (750 mg) by mouth every morning 30 tablet 1    loperamide (IMODIUM A-D) 2 MG tablet Take 1 tablet (2 mg) by mouth 4 times daily as needed for diarrhea 20 tablet 2    midodrine (PROAMATINE) 5 MG tablet Take 2 tablets (10 mg) by mouth 3 times daily 270 tablet 11    multivitamin w/minerals (THERA-VIT-M) tablet Take 1 tablet by mouth daily 30 tablet 0    thiamine (B-1) 100 MG tablet Take 1 tablet (100 mg) by mouth daily 90 tablet 3    vitamin C (ASCORBIC ACID) 500 MG tablet Take 1 tablet (500 mg) by mouth daily 90 tablet 1    Vitamin D, Cholecalciferol, 25 MCG (1000 UT) CAPS Take 1,000 mcg by mouth daily 90 capsule 3    levETIRAcetam (KEPPRA) 1000 MG tablet Take 1 tablet (1,000 mg) by mouth every evening (Patient not taking: Reported on 7/23/2024) 90 tablet 0    levETIRAcetam (KEPPRA) 750 MG tablet Take 1 tablet (750 mg) by mouth every morning (Patient not taking: Reported on 7/23/2024) 90 tablet 0       ALLERGIES   No Known  "Allergies    PAST MEDICAL HISTORY:  Past Medical History:   Diagnosis Date    Anemia     Celiac artery aneurysm (H24)     Coronary artery calcification     Crohn's disease (H)     Dementia (H)     \"moderate.\"    Seizure disorder (H)        PAST SURGICAL HISTORY:  Past Surgical History:   Procedure Laterality Date    COLONOSCOPY      ORIF HIP FRACTURE      SHOULDER SURGERY Right     ?    TOE SURGERY         FAMILY HISTORY:  Family History   Problem Relation Age of Onset    Anesthesia Reaction No family hx of     Venous thrombosis No family hx of        SOCIAL HISTORY:  Social History     Socioeconomic History    Marital status:      Spouse name: None    Number of children: None    Years of education: None    Highest education level: None   Tobacco Use    Smoking status: Former     Current packs/day: 0.00     Types: Cigarettes     Quit date:      Years since quittin.5    Smokeless tobacco: Never   Vaping Use    Vaping status: Never Used   Substance and Sexual Activity    Alcohol use: Not Currently    Drug use: Not Currently     Social Determinants of Health     Financial Resource Strain: Unknown (3/15/2024)    Financial Resource Strain     Within the past 12 months, have you or your family members you live with been unable to get utilities (heat, electricity) when it was really needed?: Patient declined   Food Insecurity: Unknown (3/15/2024)    Food Insecurity     Within the past 12 months, did you worry that your food would run out before you got money to buy more?: Patient declined     Within the past 12 months, did the food you bought just not last and you didn t have money to get more?: Patient declined   Transportation Needs: Unknown (3/15/2024)    Transportation Needs     Within the past 12 months, has lack of transportation kept you from medical appointments, getting your medicines, non-medical meetings or appointments, work, or from getting things that you need?: Patient declined " "  Interpersonal Safety: Low Risk  (6/20/2024)    Interpersonal Safety     Do you feel physically and emotionally safe where you currently live?: Yes     Within the past 12 months, have you been hit, slapped, kicked or otherwise physically hurt by someone?: No     Within the past 12 months, have you been humiliated or emotionally abused in other ways by your partner or ex-partner?: No   Housing Stability: Unknown (3/15/2024)    Housing Stability     Do you have housing? : Patient declined     Are you worried about losing your housing?: Patient declined       Review of Systems:  Skin:        Eyes:       ENT:       Respiratory:  Negative    Cardiovascular:  Negative    Gastroenterology:      Genitourinary:       Musculoskeletal:       Neurologic:       Psychiatric:       Heme/Lymph/Imm:       Endocrine:  Negative      Physical Exam:  Vitals: /72   Pulse 62   Ht 1.753 m (5' 9\")   Wt 69.6 kg (153 lb 8 oz)   SpO2 99%   BMI 22.67 kg/m      Constitutional:    frail      Skin:  warm and dry to the touch, no apparent skin lesions or masses noted          Head:  normocephalic        Eyes:           Lymph:No Cervical lymphadenopathy present     ENT:  no pallor or cyanosis, dentition good        Neck:  JVP normal;no carotid bruit        Respiratory:  normal breath sounds, clear to auscultation, normal A-P diameter, normal symmetry, normal respiratory excursion, no use of accessory muscles         Cardiac: regular rhythm, normal S1/S2, no S3 or S4, apical impulse not displaced, no murmurs, gallops or rubs                  pulses below the femoral arteries are diminished                                      GI:  abdomen soft        Extremities and Muscular Skeletal:  no deformities, clubbing, cyanosis, erythema observed              Neurological:  no gross motor deficits        Psych:  Alert and Oriented x 3        Recent Lab Results:  LIPID RESULTS:  Lab Results   Component Value Date    CHOL 189 04/17/2024    HDL 45 " "04/17/2024     (H) 04/17/2024    TRIG 115 04/17/2024       LIVER ENZYME RESULTS:  Lab Results   Component Value Date    AST 24 04/25/2024    ALT 19 04/25/2024       CBC RESULTS:  Lab Results   Component Value Date    WBC 6.3 07/16/2024    RBC 4.02 (L) 07/16/2024    HGB 12.6 (L) 07/16/2024    HCT 39.0 (L) 07/16/2024    MCV 97 07/16/2024    MCH 31.3 07/16/2024    MCHC 32.3 07/16/2024    RDW 14.5 07/16/2024     07/16/2024       BMP RESULTS:  Lab Results   Component Value Date     07/16/2024    POTASSIUM 4.9 07/16/2024    CHLORIDE 101 07/16/2024    CO2 28 07/16/2024    ANIONGAP 10 07/16/2024    GLC 94 07/16/2024    BUN 21.5 07/16/2024    CR 0.93 07/16/2024    GFRESTIMATED 86 07/16/2024    MALIHA 10.3 07/16/2024        A1C RESULTS:  No results found for: \"A1C\"    INR RESULTS:  Lab Results   Component Value Date    INR 1.24 (H) 04/08/2024           CC  Heidi Campos PA-C  [address] [place] [address]                 "

## 2024-07-23 NOTE — LETTER
7/23/2024    CATHY FLORES MD  [address] [facility] [address]    RE: Trevor Larios       Dear Colleague,     I had the pleasure of seeing Trevor Larios in the [facility].  HPI and Plan:   It is my pleasure to see this pleasant 73-year-old gentleman who is accompanied by his daughter.  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  I do see that this is a gentleman with dementia seizure disorder chronic anemia interstitial lung disease.  He is known to have coronary artery calcification.    In April 2024 he was assessed by the cardiology team at [facility] for small troponin elevation thought due to possible demand ischemia.  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  The patient himself is alert and able to give full details of his history of his medical conditions.  His daughter does say that they plan to do this via an endovascular approach    He has no chest pains denies shortness of breath    Cardiac examination reveals unremarkable heart sounds normal JVP no significant peripheral edema.  His chest sounds surprisingly clear.  His blood pressure is excellent at 116/72.    EKG done in April of this year shows a sinus rhythm occasional PVCs but no other major changes    I reviewed his carotid ultrasound done recently and there is no evidence of significant obstructive disease    I am reassured to see that her recent dobutamine echocardiogram done at [facility] shows preserved left ventricular systolic function and no evidence of inducible ischemia    In summary this is a gentleman with no symptoms of angina or heart failure, coronary calcification and normal stress testing    As such I think his cardiac  condition is optimized for endovascular intervention and no further cardiac testing is necessary from my point of view    I wished him well for his procedure.  Further follow-up can be with my colleagues in cardiology at [facility] if considered necessary.      No orders of the defined types were placed in this encounter.      No orders of the defined types were placed in this encounter.      Encounter Diagnoses   Name Primary?    Pre-op evaluation     Celiac artery aneurysm (H24)     Coronary artery calcification        CURRENT MEDICATIONS:  Current Outpatient Medications   Medication Sig Dispense Refill    aspirin (ASA) 81 MG chewable tablet Take 1 tablet (81 mg) by mouth every evening 90 tablet 0    calcium carbonate (TUMS) 500 MG chewable tablet Take 1 chew tab by mouth 2 times daily as needed for heartburn      cyanocobalamin (VITAMIN B-12) 500 MCG SUBL sublingual tablet Place 2 tablets (1,000 mcg) under the tongue daily 180 tablet 0    ferrous sulfate (FEROSUL) 325 (65 Fe) MG tablet Take 1 tablet (325 mg) by mouth daily 30 tablet 0    levETIRAcetam (KEPPRA) 1000 MG tablet Take 1 tablet (1,000 mg) by mouth every evening 90 tablet 0    levETIRAcetam (KEPPRA) 750 MG tablet Take 1 tablet (750 mg) by mouth every morning 30 tablet 1    loperamide (IMODIUM A-D) 2 MG tablet Take 1 tablet (2 mg) by mouth 4 times daily as needed for diarrhea 20 tablet 2    midodrine (PROAMATINE) 5 MG tablet Take 2 tablets (10 mg) by mouth 3 times daily 270 tablet 11    multivitamin w/minerals (THERA-VIT-M) tablet Take 1 tablet by mouth daily 30 tablet 0    thiamine (B-1) 100 MG tablet Take 1 tablet (100 mg) by mouth daily 90 tablet 3    vitamin C (ASCORBIC ACID) 500 MG tablet Take 1 tablet (500 mg) by mouth daily 90 tablet 1    Vitamin D, Cholecalciferol, 25 MCG (1000 UT) CAPS Take 1,000 mcg by mouth daily 90 capsule 3    levETIRAcetam (KEPPRA) 1000 MG tablet Take 1 tablet (1,000 mg) by mouth every evening (Patient not taking: Reported on  "2024) 90 tablet 0    levETIRAcetam (KEPPRA) 750 MG tablet Take 1 tablet (750 mg) by mouth every morning (Patient not taking: Reported on 2024) 90 tablet 0       ALLERGIES   No Known Allergies    PAST MEDICAL HISTORY:  Past Medical History:   Diagnosis Date    Anemia     Celiac artery aneurysm (H24)     Coronary artery calcification     Crohn's disease (H)     Dementia (H)     \"moderate.\"    Seizure disorder (H)        PAST SURGICAL HISTORY:  Past Surgical History:   Procedure Laterality Date    COLONOSCOPY      ORIF HIP FRACTURE      SHOULDER SURGERY Right     ?    TOE SURGERY         FAMILY HISTORY:  Family History   Problem Relation Age of Onset    Anesthesia Reaction No family hx of     Venous thrombosis No family hx of        SOCIAL HISTORY:  Social History     Socioeconomic History    Marital status:      Spouse name: None    Number of children: None    Years of education: None    Highest education level: None   Tobacco Use    Smoking status: Former     Current packs/day: 0.00     Types: Cigarettes     Quit date:      Years since quittin.5    Smokeless tobacco: Never   Vaping Use    Vaping status: Never Used   Substance and Sexual Activity    Alcohol use: Not Currently    Drug use: Not Currently     Social Determinants of Health     Financial Resource Strain: Unknown (3/15/2024)    Financial Resource Strain     Within the past 12 months, have you or your family members you live with been unable to get utilities (heat, electricity) when it was really needed?: Patient declined   Food Insecurity: Unknown (3/15/2024)    Food Insecurity     Within the past 12 months, did you worry that your food would run out before you got money to buy more?: Patient declined     Within the past 12 months, did the food you bought just not last and you didn t have money to get more?: Patient declined   Transportation Needs: Unknown (3/15/2024)    Transportation Needs     Within the past 12 " "months, has lack of transportation kept you from medical appointments, getting your medicines, non-medical meetings or appointments, work, or from getting things that you need?: Patient declined   Interpersonal Safety: Low Risk  (6/20/2024)    Interpersonal Safety     Do you feel physically and emotionally safe where you currently live?: Yes     Within the past 12 months, have you been hit, slapped, kicked or otherwise physically hurt by someone?: No     Within the past 12 months, have you been humiliated or emotionally abused in other ways by your partner or ex-partner?: No   Housing Stability: Unknown (3/15/2024)    Housing Stability     Do you have housing? : Patient declined     Are you worried about losing your housing?: Patient declined       Review of Systems:  Skin:        Eyes:       ENT:       Respiratory:  Negative    Cardiovascular:  Negative    Gastroenterology:      Genitourinary:       Musculoskeletal:       Neurologic:       Psychiatric:       Heme/Lymph/Imm:       Endocrine:  Negative      Physical Exam:  Vitals: /72   Pulse 62   Ht 1.753 m (5' 9\")   Wt 69.6 kg (153 lb 8 oz)   SpO2 99%   BMI 22.67 kg/m      Constitutional:    frail      Skin:  warm and dry to the touch, no apparent skin lesions or masses noted          Head:  normocephalic        Eyes:           Lymph:No Cervical lymphadenopathy present     ENT:  no pallor or cyanosis, dentition good        Neck:  JVP normal;no carotid bruit        Respiratory:  normal breath sounds, clear to auscultation, normal A-P diameter, normal symmetry, normal respiratory excursion, no use of accessory muscles         Cardiac: regular rhythm, normal S1/S2, no S3 or S4, apical impulse not displaced, no murmurs, gallops or rubs                  pulses below the femoral arteries are diminished                                      GI:  abdomen soft        Extremities and Muscular Skeletal:  no deformities, clubbing, cyanosis, erythema observed          " "    Neurological:  no gross motor deficits        Psych:  Alert and Oriented x 3        Recent Lab Results:  LIPID RESULTS:  Lab Results   Component Value Date    CHOL 189 04/17/2024    HDL 45 04/17/2024     (H) 04/17/2024    TRIG 115 04/17/2024       LIVER ENZYME RESULTS:  Lab Results   Component Value Date    AST 24 04/25/2024    ALT 19 04/25/2024       CBC RESULTS:  Lab Results   Component Value Date    WBC 6.3 07/16/2024    RBC 4.02 (L) 07/16/2024    HGB 12.6 (L) 07/16/2024    HCT 39.0 (L) 07/16/2024    MCV 97 07/16/2024    MCH 31.3 07/16/2024    MCHC 32.3 07/16/2024    RDW 14.5 07/16/2024     07/16/2024       BMP RESULTS:  Lab Results   Component Value Date     07/16/2024    POTASSIUM 4.9 07/16/2024    CHLORIDE 101 07/16/2024    CO2 28 07/16/2024    ANIONGAP 10 07/16/2024    GLC 94 07/16/2024    BUN 21.5 07/16/2024    CR 0.93 07/16/2024    GFRESTIMATED 86 07/16/2024    MALIHA 10.3 07/16/2024        A1C RESULTS:  No results found for: \"A1C\"    INR RESULTS:  Lab Results   Component Value Date    INR 1.24 (H) 04/08/2024     CC  MERISSA SandovalC  [address] [place] [address]    Thank you for allowing me to participate in the care of your patient.      Sincerely,   DR GUILHERME VITALE MD   [facility]  "

## 2024-07-24 ENCOUNTER — MYC REFILL (OUTPATIENT)
Dept: FAMILY MEDICINE | Facility: CLINIC | Age: 74
End: 2024-07-24
Payer: COMMERCIAL

## 2024-07-24 DIAGNOSIS — R63.6 UNDERWEIGHT: ICD-10-CM

## 2024-07-24 RX ORDER — MIDODRINE HYDROCHLORIDE 5 MG/1
10 TABLET ORAL 3 TIMES DAILY
Qty: 270 TABLET | Refills: 11 | OUTPATIENT
Start: 2024-07-24

## 2024-07-25 ENCOUNTER — TELEPHONE (OUTPATIENT)
Dept: FAMILY MEDICINE | Facility: CLINIC | Age: 74
End: 2024-07-25
Payer: COMMERCIAL

## 2024-07-25 NOTE — TELEPHONE ENCOUNTER
July 25, 2024    Home health orders was received via fax for Dr. Crouch.  Patient label was attached to paperwork and placed in provider's inbox to be signed.    Karen Smith

## 2024-07-26 ENCOUNTER — TELEPHONE (OUTPATIENT)
Dept: FAMILY MEDICINE | Facility: CLINIC | Age: 74
End: 2024-07-26
Payer: COMMERCIAL

## 2024-07-26 NOTE — TELEPHONE ENCOUNTER
July 26, 2024    Home health orders was received via fax for Dr. Crouch.  Patient label was attached to paperwork and placed in provider's inbox to be signed.    Rafy Childs

## 2024-07-28 DIAGNOSIS — Z53.9 DIAGNOSIS NOT YET DEFINED: Primary | ICD-10-CM

## 2024-07-28 PROCEDURE — G0179 MD RECERTIFICATION HHA PT: HCPCS | Performed by: FAMILY MEDICINE

## 2024-07-29 NOTE — TELEPHONE ENCOUNTER
July 29, 2024    Home health orders was picked up from outbox of Dr. Crouch and sent via fax to [phone].    Karen Smith

## 2024-07-29 NOTE — TELEPHONE ENCOUNTER
July 29, 2024    Home health orders was picked up from outbox of Dr. Crouch and sent via fax to [phone].    Karen Smith

## 2024-08-02 NOTE — TELEPHONE ENCOUNTER
RECORDS RECEIVED FROM: Internal    REASON FOR VISIT: Seizures   PROVIDER: Francie Santiago MD   DATE OF APPT: 8/23/24 @ 11:00 am    NOTES (FOR ALL VISITS) STATUS DETAILS   OFFICE NOTE from referring provider [facility] Referral (4/7/24)   OFFICE NOTE from other specialist Internal 4/29/24 Shine Sprague MD @[facility]    4/17/24 Lyly Crouch MD @[facility]     DISCHARGE SUMMARY from hospital Internal 4/24/24-4/25/24 Leia Siegel MD @[facility]    4/7/4-4/11/24 Enzo Real MD @[facility]     EEG Internal [facility]  4/7/24 EEG Video 2-12 Hrs Unmonitored  4/7/24 EEG Video 12-26 Hrs Unmonitored     MEDICATION LIST Internal    IMAGING  (FOR ALL VISITS)     PET  Internal [facility]  6/13/24 PET Oncology Whole Body     MRI (HEAD, NECK, SPINE) Internal [facility]  4/8/24 MR Brain     CT (HEAD, NECK, SPINE) Internal [facility]  4/24/24 CT Head  4/7/24 CT Cervical Spine  4/7/24 CT Head

## 2024-08-05 ASSESSMENT — COPD QUESTIONNAIRES
CAT_SEVERITY: MILD
COPD: 1

## 2024-08-05 ASSESSMENT — ENCOUNTER SYMPTOMS: SEIZURES: 1

## 2024-08-05 NOTE — ANESTHESIA PREPROCEDURE EVALUATION
"Anesthesia Pre-Procedure Evaluation    Patient: Trevor Larios   MRN: [id number] : 1950        Procedure : Procedure(s):  REPAIR, ANEURYSM, Celiac, THORACOABDOMINAL aorta, Possible splenic or celiac embolization  with physician modified endograft.          Past Medical History:   Diagnosis Date    Anemia     Celiac artery aneurysm (H24)     Coronary artery calcification     Crohn's disease (H)     Dementia (H)     \"moderate.\"    Seizure disorder (H)       Past Surgical History:   Procedure Laterality Date    COLONOSCOPY      ORIF HIP FRACTURE      SHOULDER SURGERY Right     ?    TOE SURGERY        No Known Allergies   Social History     Tobacco Use    Smoking status: Former     Current packs/day: 0.00     Types: Cigarettes     Quit date:      Years since quittin.5    Smokeless tobacco: Never   Substance Use Topics    Alcohol use: Not Currently      Wt Readings from Last 1 Encounters:   24 69.6 kg (153 lb 8 oz)        Anesthesia Evaluation   Pt has had prior anesthetic.     No history of anesthetic complications       ROS/MED HX  ENT/Pulmonary: Comment: Possible ILD vs alveolar proteinosis    (+)                         mild,  COPD,    recent URI, resolved, pneumonia :     (-) asthma and ART risk factors   Neurologic:     (+)   dementia,    seizures (on keppra), last seizure: 2024, features: tonic-clonic,                    (-) no CVA, no TIA and migraines   Cardiovascular: Comment: Celiac artery aneurysm. 4.4 x 4.7 x 4.2    (+)  - -  CAD -  - -                          Irregular Heartbeat/Palpitations,       Previous cardiac testing   Echo: Date: 24 Results:  Interpretation Summary  The visual ejection fraction is 55-60%. Relative wall thickness is increased  consistent with concentric remodeling.  No regional wall motion abnormalities are seen.  Right ventricular function, chamber size, wall motion, and thickness are  normal.  Severe biatrial enlargement is " present.  Trace to mild aortic insufficiency is present.  All other valves are normal.  IVC diameter <2.1 cm collapsing >50% with sniff suggests a normal RA pressure  of 3 mmHg.  No pericardial effusion is present.  There is no prior study for direct comparison.    Stress Test:  Date: Results:    ECG Reviewed:  Date: 4/24/24 Results:  Sinus rhythm with Premature atrial complexes with Aberrant conduction  Otherwise normal ECG  Cath:  Date: 7/18/24 Results:  Normal, low-risk dobutamine echocardiogram without evidence of ischemia.  The target heart rate was achieved.  Normal biventricular size, thickness, and global systolic function at  baseline, LVEF=60-65%.  With low-dose dobutamine, LVEF augmented and LV cavity size decreased  appropriately.  With peak dobutamine, LVEF increased further to >70% and LV cavity size  decreased appropriately.  No regional wall motion abnormalities at rest or with dobutamine.  No angina was elicited.  No ECG evidence of ischemia. Frequent PVCs noted throughout the test.  Normal heart rate and blood pressure response to dobutamine.  Mild aortic insufficiency.  The aortic root and visualized ascending aorta are normal.   (-) murmur   METS/Exercise Tolerance: 3 - Able to walk 1-2 blocks without stopping Comment: Denies chest pain/pressure, RAMIREZ, orthopnea, dizziness, palpitations, edema.    Hematologic:  - neg hematologic  ROS     Musculoskeletal: Comment: Hx ORIF L hip  Hx right shoulder surgery      GI/Hepatic:     (+) GERD (PRN Tums), Asymptomatic on medication,     Inflammatory bowel disease,          (-) liver disease   Renal/Genitourinary:  - neg Renal ROS     Endo:  - neg endo ROS     Psychiatric/Substance Use:  - neg psychiatric ROS     Infectious Disease: Comment: Hx of c.diff   (-) Recent Fever   Malignancy:  - neg malignancy ROS     Other:  - neg other ROS          Physical Exam    Airway        Mallampati: II   TM distance: > 3 FB   Neck ROM: limited   Mouth opening: > 3  "cm    Respiratory Devices and Support         Dental       (+) Multiple visibly decayed, broken teeth      Cardiovascular          Rhythm and rate: regular and normal (-) no murmur    Pulmonary           breath sounds clear to auscultation           OUTSIDE LABS:  CBC:   Lab Results   Component Value Date    WBC 6.3 07/16/2024    WBC 7.1 06/20/2024    HGB 12.6 (L) 07/16/2024    HGB 11.7 (L) 06/20/2024    HCT 39.0 (L) 07/16/2024    HCT 37.1 (L) 06/20/2024     07/16/2024     06/20/2024     BMP:   Lab Results   Component Value Date     07/16/2024     04/25/2024    POTASSIUM 4.9 07/16/2024    POTASSIUM 4.4 04/25/2024    CHLORIDE 101 07/16/2024    CHLORIDE 105 04/25/2024    CO2 28 07/16/2024    CO2 25 04/25/2024    BUN 21.5 07/16/2024    BUN 22.2 04/25/2024    CR 0.93 07/16/2024    CR 0.92 04/25/2024    GLC 94 07/16/2024    GLC 95 04/25/2024     COAGS:   Lab Results   Component Value Date    INR 1.24 (H) 04/08/2024     POC: No results found for: \"BGM\", \"HCG\", \"HCGS\"  HEPATIC:   Lab Results   Component Value Date    ALBUMIN 3.8 04/25/2024    PROTTOTAL 7.8 04/25/2024    ALT 19 04/25/2024    AST 24 04/25/2024    ALKPHOS 79 04/25/2024    BILITOTAL 0.4 04/25/2024     OTHER:   Lab Results   Component Value Date    LACT 1.8 04/07/2024    MALIHA 10.3 07/16/2024    PHOS 4.5 04/18/2024    MAG 2.2 04/18/2024       Anesthesia Plan    ASA Status:  3    NPO Status:  NPO Appropriate    Anesthesia Type: General.     - Airway: ETT   Induction: Intravenous, Propofol.   Maintenance: TIVA.   Techniques and Equipment:     - Airway: Video-Laryngoscope     - Lines/Monitors: 2nd IV, Arterial Line, BIS, Central Line, CVP     - Blood: PRBC, T&C     - Drips/Meds: Remifentanil, Phenylephrine     Consents    Anesthesia Plan(s) and associated risks, benefits, and realistic alternatives discussed. Questions answered and patient/representative(s) expressed understanding.     - Discussed: Risks, Benefits and Alternatives for BOTH " SEDATION and the PROCEDURE were discussed     - Discussed with:  Patient, Other (See Comment) (daughter)      - Extended Intubation/Ventilatory Support Discussed: Yes.      - Patient is DNR/DNI Status: No     Use of blood products discussed: Yes.     - Discussed with: Patient, Other (see comment) (daughter).     - Consented: consented to blood products     Postoperative Care    Pain management: IV analgesics, Oral pain medications, Multi-modal analgesia.   PONV prophylaxis: Ondansetron (or other 5HT-3), Dexamethasone or Solumedrol, Background Propofol Infusion     Comments:               Don Stover MD    I have reviewed the pertinent notes and labs in the chart from the past 30 days and (re)examined the patient.  Any updates or changes from those notes are reflected in this note.

## 2024-08-06 ENCOUNTER — ANESTHESIA (OUTPATIENT)
Dept: SURGERY | Facility: CLINIC | Age: 74
DRG: 269 | End: 2024-08-06
Payer: COMMERCIAL

## 2024-08-06 ENCOUNTER — APPOINTMENT (OUTPATIENT)
Dept: INTERVENTIONAL RADIOLOGY/VASCULAR | Facility: CLINIC | Age: 74
DRG: 269 | End: 2024-08-06
Attending: SURGERY
Payer: COMMERCIAL

## 2024-08-06 ENCOUNTER — HOSPITAL ENCOUNTER (INPATIENT)
Facility: CLINIC | Age: 74
LOS: 3 days | Discharge: HOME HEALTH | DRG: 269 | End: 2024-08-09
Attending: SURGERY | Admitting: SURGERY
Payer: COMMERCIAL

## 2024-08-06 DIAGNOSIS — Z91.148 NONCOMPLIANCE WITH MEDICATION REGIMEN: ICD-10-CM

## 2024-08-06 DIAGNOSIS — S01.81XA LACERATION OF FOREHEAD, INITIAL ENCOUNTER: Primary | ICD-10-CM

## 2024-08-06 DIAGNOSIS — R62.7 FAILURE TO THRIVE IN ADULT: ICD-10-CM

## 2024-08-06 DIAGNOSIS — R29.6 FALLS FREQUENTLY: ICD-10-CM

## 2024-08-06 DIAGNOSIS — R53.1 WEAKNESS: ICD-10-CM

## 2024-08-06 DIAGNOSIS — I72.8 CELIAC ARTERY ANEURYSM (H): ICD-10-CM

## 2024-08-06 LAB
ABO/RH(D): NORMAL
ACT BLD: 153 SECONDS (ref 74–150)
ACT BLD: 236 SECONDS (ref 74–150)
ACT BLD: 245 SECONDS (ref 74–150)
ACT BLD: 266 SECONDS (ref 74–150)
ACT BLD: 287 SECONDS (ref 74–150)
ACT BLD: 287 SECONDS (ref 74–150)
ACT BLD: 291 SECONDS (ref 74–150)
ACT BLD: 303 SECONDS (ref 74–150)
ALBUMIN SERPL BCG-MCNC: 3.9 G/DL (ref 3.5–5.2)
ALLEN'S TEST: ABNORMAL
ALP SERPL-CCNC: 65 U/L (ref 40–150)
ALT SERPL W P-5'-P-CCNC: 8 U/L (ref 0–70)
ANION GAP SERPL CALCULATED.3IONS-SCNC: 10 MMOL/L (ref 7–15)
ANION GAP SERPL CALCULATED.3IONS-SCNC: 12 MMOL/L (ref 7–15)
ANTIBODY SCREEN: NEGATIVE
APTT PPP: 40 SECONDS (ref 22–38)
AST SERPL W P-5'-P-CCNC: 21 U/L (ref 0–45)
BASE EXCESS BLDA CALC-SCNC: -1.4 MMOL/L (ref -3–3)
BASOPHILS # BLD AUTO: 0 10E3/UL (ref 0–0.2)
BASOPHILS NFR BLD AUTO: 1 %
BILIRUB SERPL-MCNC: 0.5 MG/DL
BLD PROD TYP BPU: NORMAL
BLOOD COMPONENT TYPE: NORMAL
BUN SERPL-MCNC: 20.7 MG/DL (ref 8–23)
BUN SERPL-MCNC: 22.2 MG/DL (ref 8–23)
CALCIUM SERPL-MCNC: 9 MG/DL (ref 8.8–10.4)
CALCIUM SERPL-MCNC: 9.7 MG/DL (ref 8.8–10.4)
CHLORIDE SERPL-SCNC: 103 MMOL/L (ref 98–107)
CHLORIDE SERPL-SCNC: 104 MMOL/L (ref 98–107)
CODING SYSTEM: NORMAL
COHGB MFR BLD: 99.7 % (ref 95–96)
CREAT SERPL-MCNC: 1.12 MG/DL (ref 0.67–1.17)
CREAT SERPL-MCNC: 1.3 MG/DL (ref 0.67–1.17)
CROSSMATCH: NORMAL
EGFRCR SERPLBLD CKD-EPI 2021: 57 ML/MIN/1.73M2
EGFRCR SERPLBLD CKD-EPI 2021: 69 ML/MIN/1.73M2
EOSINOPHIL # BLD AUTO: 0.3 10E3/UL (ref 0–0.7)
EOSINOPHIL NFR BLD AUTO: 5 %
ERYTHROCYTE [DISTWIDTH] IN BLOOD BY AUTOMATED COUNT: 14.7 % (ref 10–15)
ERYTHROCYTE [DISTWIDTH] IN BLOOD BY AUTOMATED COUNT: 14.7 % (ref 10–15)
FIBRINOGEN PPP-MCNC: 440 MG/DL (ref 170–510)
GLUCOSE BLDC GLUCOMTR-MCNC: 124 MG/DL (ref 70–99)
GLUCOSE SERPL-MCNC: 128 MG/DL (ref 70–99)
GLUCOSE SERPL-MCNC: 85 MG/DL (ref 70–99)
HCO3 BLD-SCNC: 24 MMOL/L (ref 21–28)
HCO3 SERPL-SCNC: 22 MMOL/L (ref 22–29)
HCO3 SERPL-SCNC: 24 MMOL/L (ref 22–29)
HCT VFR BLD AUTO: 34.4 % (ref 40–53)
HCT VFR BLD AUTO: 41.3 % (ref 40–53)
HGB BLD-MCNC: 11.3 G/DL (ref 13.3–17.7)
HGB BLD-MCNC: 13.2 G/DL (ref 13.3–17.7)
IMM GRANULOCYTES # BLD: 0 10E3/UL
IMM GRANULOCYTES NFR BLD: 0 %
INR PPP: 1.14 (ref 0.85–1.15)
LACTATE SERPL-SCNC: 0.7 MMOL/L (ref 0.7–2)
LACTATE SERPL-SCNC: 0.7 MMOL/L (ref 0.7–2)
LYMPHOCYTES # BLD AUTO: 1.9 10E3/UL (ref 0.8–5.3)
LYMPHOCYTES NFR BLD AUTO: 31 %
MCH RBC QN AUTO: 31.4 PG (ref 26.5–33)
MCH RBC QN AUTO: 31.5 PG (ref 26.5–33)
MCHC RBC AUTO-ENTMCNC: 32 G/DL (ref 31.5–36.5)
MCHC RBC AUTO-ENTMCNC: 32.8 G/DL (ref 31.5–36.5)
MCV RBC AUTO: 96 FL (ref 78–100)
MCV RBC AUTO: 98 FL (ref 78–100)
MONOCYTES # BLD AUTO: 0.4 10E3/UL (ref 0–1.3)
MONOCYTES NFR BLD AUTO: 6 %
NEUTROPHILS # BLD AUTO: 3.5 10E3/UL (ref 1.6–8.3)
NEUTROPHILS NFR BLD AUTO: 57 %
NRBC # BLD AUTO: 0 10E3/UL
NRBC BLD AUTO-RTO: 0 /100
O2/TOTAL GAS SETTING VFR VENT: 28 %
PCO2 BLD: 39 MM HG (ref 35–45)
PH BLD: 7.39 [PH] (ref 7.35–7.45)
PLATELET # BLD AUTO: 217 10E3/UL (ref 150–450)
PLATELET # BLD AUTO: 306 10E3/UL (ref 150–450)
PO2 BLD: 123 MM HG (ref 80–105)
POTASSIUM SERPL-SCNC: 4.5 MMOL/L (ref 3.4–5.3)
POTASSIUM SERPL-SCNC: 4.5 MMOL/L (ref 3.4–5.3)
PROT SERPL-MCNC: 7.5 G/DL (ref 6.4–8.3)
RBC # BLD AUTO: 3.59 10E6/UL (ref 4.4–5.9)
RBC # BLD AUTO: 4.2 10E6/UL (ref 4.4–5.9)
SAO2 % BLDA: 97 % (ref 92–100)
SODIUM SERPL-SCNC: 136 MMOL/L (ref 135–145)
SODIUM SERPL-SCNC: 139 MMOL/L (ref 135–145)
SPECIMEN EXPIRATION DATE: NORMAL
UNIT ABO/RH: NORMAL
UNIT NUMBER: NORMAL
UNIT STATUS: NORMAL
UNIT TYPE ISBT: 5100
WBC # BLD AUTO: 6.2 10E3/UL (ref 4–11)
WBC # BLD AUTO: 6.6 10E3/UL (ref 4–11)

## 2024-08-06 PROCEDURE — 255N000002 HC RX 255 OP 636: Performed by: SURGERY

## 2024-08-06 PROCEDURE — 85347 COAGULATION TIME ACTIVATED: CPT

## 2024-08-06 PROCEDURE — C1887 CATHETER, GUIDING: HCPCS | Performed by: SURGERY

## 2024-08-06 PROCEDURE — 200N000002 HC R&B ICU UMMC

## 2024-08-06 PROCEDURE — 250N000011 HC RX IP 250 OP 636: Performed by: STUDENT IN AN ORGANIZED HEALTH CARE EDUCATION/TRAINING PROGRAM

## 2024-08-06 PROCEDURE — 33880 EVASC RPR TA NDGFT COV LSA: CPT | Performed by: ANESTHESIOLOGY

## 2024-08-06 PROCEDURE — C1725 CATH, TRANSLUMIN NON-LASER: HCPCS | Performed by: SURGERY

## 2024-08-06 PROCEDURE — 80048 BASIC METABOLIC PNL TOTAL CA: CPT

## 2024-08-06 PROCEDURE — 85730 THROMBOPLASTIN TIME PARTIAL: CPT | Performed by: STUDENT IN AN ORGANIZED HEALTH CARE EDUCATION/TRAINING PROGRAM

## 2024-08-06 PROCEDURE — 36415 COLL VENOUS BLD VENIPUNCTURE: CPT

## 2024-08-06 PROCEDURE — 370N000017 HC ANESTHESIA TECHNICAL FEE, PER MIN: Performed by: SURGERY

## 2024-08-06 PROCEDURE — 85014 HEMATOCRIT: CPT | Performed by: STUDENT IN AN ORGANIZED HEALTH CARE EDUCATION/TRAINING PROGRAM

## 2024-08-06 PROCEDURE — 272N000002 HC OR SUPPLY OTHER OPNP: Performed by: SURGERY

## 2024-08-06 PROCEDURE — 85025 COMPLETE CBC W/AUTO DIFF WBC: CPT

## 2024-08-06 PROCEDURE — 04V13DZ RESTRICTION OF CELIAC ARTERY WITH INTRALUMINAL DEVICE, PERCUTANEOUS APPROACH: ICD-10-PCS | Performed by: SURGERY

## 2024-08-06 PROCEDURE — 258N000003 HC RX IP 258 OP 636: Performed by: STUDENT IN AN ORGANIZED HEALTH CARE EDUCATION/TRAINING PROGRAM

## 2024-08-06 PROCEDURE — 250N000009 HC RX 250: Performed by: STUDENT IN AN ORGANIZED HEALTH CARE EDUCATION/TRAINING PROGRAM

## 2024-08-06 PROCEDURE — 4A11X4G MONITORING OF PERIPHERAL NERVOUS ELECTRICAL ACTIVITY, INTRAOPERATIVE, EXTERNAL APPROACH: ICD-10-PCS | Performed by: SURGERY

## 2024-08-06 PROCEDURE — 99100 ANES PT EXTEME AGE<1 YR&>70: CPT | Performed by: ANESTHESIOLOGY

## 2024-08-06 PROCEDURE — 83605 ASSAY OF LACTIC ACID: CPT | Performed by: STUDENT IN AN ORGANIZED HEALTH CARE EDUCATION/TRAINING PROGRAM

## 2024-08-06 PROCEDURE — 258N000003 HC RX IP 258 OP 636: Performed by: SURGERY

## 2024-08-06 PROCEDURE — 04793DZ DILATION OF RIGHT RENAL ARTERY WITH INTRALUMINAL DEVICE, PERCUTANEOUS APPROACH: ICD-10-PCS | Performed by: SURGERY

## 2024-08-06 PROCEDURE — 85048 AUTOMATED LEUKOCYTE COUNT: CPT | Performed by: STUDENT IN AN ORGANIZED HEALTH CARE EDUCATION/TRAINING PROGRAM

## 2024-08-06 PROCEDURE — 250N000013 HC RX MED GY IP 250 OP 250 PS 637

## 2024-08-06 PROCEDURE — 250N000013 HC RX MED GY IP 250 OP 250 PS 637: Performed by: STUDENT IN AN ORGANIZED HEALTH CARE EDUCATION/TRAINING PROGRAM

## 2024-08-06 PROCEDURE — C1894 INTRO/SHEATH, NON-LASER: HCPCS | Performed by: SURGERY

## 2024-08-06 PROCEDURE — C1769 GUIDE WIRE: HCPCS | Performed by: SURGERY

## 2024-08-06 PROCEDURE — 250N000009 HC RX 250: Performed by: SURGERY

## 2024-08-06 PROCEDURE — 360N000086 HC SURGERY LEVEL 6 W/ FLUORO, PER MIN: Performed by: SURGERY

## 2024-08-06 PROCEDURE — 250N000025 HC SEVOFLURANE, PER MIN: Performed by: SURGERY

## 2024-08-06 PROCEDURE — 85384 FIBRINOGEN ACTIVITY: CPT | Performed by: STUDENT IN AN ORGANIZED HEALTH CARE EDUCATION/TRAINING PROGRAM

## 2024-08-06 PROCEDURE — 34713 PERQ ACCESS & CLSR FEM ART: CPT | Mod: 50 | Performed by: SURGERY

## 2024-08-06 PROCEDURE — 250N000011 HC RX IP 250 OP 636

## 2024-08-06 PROCEDURE — 04V03FZ RESTRICTION OF ABDOMINAL AORTA WITH BRANCHED OR FENESTRATED INTRALUMINAL DEVICE, THREE OR MORE ARTERIES, PERCUTANEOUS APPROACH: ICD-10-PCS | Performed by: SURGERY

## 2024-08-06 PROCEDURE — 258N000001 HC RX 258: Performed by: SURGERY

## 2024-08-06 PROCEDURE — 85004 AUTOMATED DIFF WBC COUNT: CPT

## 2024-08-06 PROCEDURE — 85610 PROTHROMBIN TIME: CPT | Performed by: STUDENT IN AN ORGANIZED HEALTH CARE EDUCATION/TRAINING PROGRAM

## 2024-08-06 PROCEDURE — 86900 BLOOD TYPING SEROLOGIC ABO: CPT

## 2024-08-06 PROCEDURE — 82374 ASSAY BLOOD CARBON DIOXIDE: CPT

## 2024-08-06 PROCEDURE — 82805 BLOOD GASES W/O2 SATURATION: CPT | Performed by: STUDENT IN AN ORGANIZED HEALTH CARE EDUCATION/TRAINING PROGRAM

## 2024-08-06 PROCEDURE — 272N000001 HC OR GENERAL SUPPLY STERILE: Performed by: SURGERY

## 2024-08-06 PROCEDURE — 99222 1ST HOSP IP/OBS MODERATE 55: CPT | Mod: GC | Performed by: SURGERY

## 2024-08-06 PROCEDURE — 80053 COMPREHEN METABOLIC PANEL: CPT | Performed by: STUDENT IN AN ORGANIZED HEALTH CARE EDUCATION/TRAINING PROGRAM

## 2024-08-06 PROCEDURE — 34844 ENDOVASC VISC AORTA 4 GRAFT: CPT | Mod: GC | Performed by: SURGERY

## 2024-08-06 PROCEDURE — 250N000009 HC RX 250

## 2024-08-06 PROCEDURE — 999N000083 IR OR ANGIOGRAM

## 2024-08-06 PROCEDURE — 86923 COMPATIBILITY TEST ELECTRIC: CPT

## 2024-08-06 PROCEDURE — 258N000003 HC RX IP 258 OP 636

## 2024-08-06 PROCEDURE — 999N000141 HC STATISTIC PRE-PROCEDURE NURSING ASSESSMENT: Performed by: SURGERY

## 2024-08-06 PROCEDURE — C1874 STENT, COATED/COV W/DEL SYS: HCPCS | Performed by: SURGERY

## 2024-08-06 PROCEDURE — C2628 CATHETER, OCCLUSION: HCPCS | Performed by: SURGERY

## 2024-08-06 PROCEDURE — C1760 CLOSURE DEV, VASC: HCPCS | Performed by: SURGERY

## 2024-08-06 PROCEDURE — 250N000011 HC RX IP 250 OP 636: Performed by: SURGERY

## 2024-08-06 DEVICE — IMPLANTABLE DEVICE: Type: IMPLANTABLE DEVICE | Site: ABDOMEN | Status: FUNCTIONAL

## 2024-08-06 DEVICE — STENT PRB35-06-040-080 PROTEGE EF V07
Type: IMPLANTABLE DEVICE | Site: ABDOMEN | Status: FUNCTIONAL
Brand: EVERFLEX™ PROTÉGÉ™ EVERFLEX™

## 2024-08-06 DEVICE — ICAST COVERED STENT SYSTEM, 6MMX22MMX120CM
Type: IMPLANTABLE DEVICE | Site: ABDOMEN | Status: FUNCTIONAL
Brand: ICAST COVERED STENT SYSTEM, 6MMX22MMX120CM

## 2024-08-06 RX ORDER — NALOXONE HYDROCHLORIDE 0.4 MG/ML
0.2 INJECTION, SOLUTION INTRAMUSCULAR; INTRAVENOUS; SUBCUTANEOUS
Status: DISCONTINUED | OUTPATIENT
Start: 2024-08-06 | End: 2024-08-09 | Stop reason: HOSPADM

## 2024-08-06 RX ORDER — FENTANYL CITRATE 50 UG/ML
50 INJECTION, SOLUTION INTRAMUSCULAR; INTRAVENOUS EVERY 5 MIN PRN
Status: DISCONTINUED | OUTPATIENT
Start: 2024-08-06 | End: 2024-08-06

## 2024-08-06 RX ORDER — SODIUM CHLORIDE, SODIUM LACTATE, POTASSIUM CHLORIDE, CALCIUM CHLORIDE 600; 310; 30; 20 MG/100ML; MG/100ML; MG/100ML; MG/100ML
INJECTION, SOLUTION INTRAVENOUS CONTINUOUS
Status: DISCONTINUED | OUTPATIENT
Start: 2024-08-06 | End: 2024-08-06

## 2024-08-06 RX ORDER — SODIUM CHLORIDE, SODIUM LACTATE, POTASSIUM CHLORIDE, CALCIUM CHLORIDE 600; 310; 30; 20 MG/100ML; MG/100ML; MG/100ML; MG/100ML
INJECTION, SOLUTION INTRAVENOUS CONTINUOUS
Status: DISCONTINUED | OUTPATIENT
Start: 2024-08-06 | End: 2024-08-07

## 2024-08-06 RX ORDER — BISACODYL 10 MG
10 SUPPOSITORY, RECTAL RECTAL DAILY PRN
Status: DISCONTINUED | OUTPATIENT
Start: 2024-08-09 | End: 2024-08-09 | Stop reason: HOSPADM

## 2024-08-06 RX ORDER — DEXAMETHASONE SODIUM PHOSPHATE 10 MG/ML
4 INJECTION, SOLUTION INTRAMUSCULAR; INTRAVENOUS
Status: DISCONTINUED | OUTPATIENT
Start: 2024-08-06 | End: 2024-08-06

## 2024-08-06 RX ORDER — AMOXICILLIN 250 MG
1 CAPSULE ORAL 2 TIMES DAILY
Status: DISCONTINUED | OUTPATIENT
Start: 2024-08-06 | End: 2024-08-08

## 2024-08-06 RX ORDER — ACETAMINOPHEN 325 MG/1
975 TABLET ORAL ONCE
Status: COMPLETED | OUTPATIENT
Start: 2024-08-06 | End: 2024-08-06

## 2024-08-06 RX ORDER — ONDANSETRON 2 MG/ML
INJECTION INTRAMUSCULAR; INTRAVENOUS PRN
Status: DISCONTINUED | OUTPATIENT
Start: 2024-08-06 | End: 2024-08-06

## 2024-08-06 RX ORDER — LIDOCAINE 40 MG/G
CREAM TOPICAL
Status: DISCONTINUED | OUTPATIENT
Start: 2024-08-06 | End: 2024-08-06

## 2024-08-06 RX ORDER — ONDANSETRON 2 MG/ML
4 INJECTION INTRAMUSCULAR; INTRAVENOUS EVERY 30 MIN PRN
Status: DISCONTINUED | OUTPATIENT
Start: 2024-08-06 | End: 2024-08-06

## 2024-08-06 RX ORDER — CEFAZOLIN SODIUM/WATER 2 G/20 ML
2 SYRINGE (ML) INTRAVENOUS SEE ADMIN INSTRUCTIONS
Status: DISCONTINUED | OUTPATIENT
Start: 2024-08-06 | End: 2024-08-06

## 2024-08-06 RX ORDER — NALOXONE HYDROCHLORIDE 0.4 MG/ML
0.2 INJECTION, SOLUTION INTRAMUSCULAR; INTRAVENOUS; SUBCUTANEOUS
Status: DISCONTINUED | OUTPATIENT
Start: 2024-08-06 | End: 2024-08-06

## 2024-08-06 RX ORDER — LIDOCAINE 40 MG/G
CREAM TOPICAL
Status: DISCONTINUED | OUTPATIENT
Start: 2024-08-06 | End: 2024-08-09 | Stop reason: HOSPADM

## 2024-08-06 RX ORDER — CEFAZOLIN SODIUM 1 G/3ML
1 INJECTION, POWDER, FOR SOLUTION INTRAMUSCULAR; INTRAVENOUS EVERY 8 HOURS
Status: COMPLETED | OUTPATIENT
Start: 2024-08-06 | End: 2024-08-07

## 2024-08-06 RX ORDER — IODIXANOL 320 MG/ML
INJECTION, SOLUTION INTRAVASCULAR PRN
Status: DISCONTINUED | OUTPATIENT
Start: 2024-08-06 | End: 2024-08-06 | Stop reason: HOSPADM

## 2024-08-06 RX ORDER — PROPOFOL 10 MG/ML
INJECTION, EMULSION INTRAVENOUS PRN
Status: DISCONTINUED | OUTPATIENT
Start: 2024-08-06 | End: 2024-08-06

## 2024-08-06 RX ORDER — ASPIRIN 81 MG/1
81 TABLET, CHEWABLE ORAL EVERY EVENING
Status: DISCONTINUED | OUTPATIENT
Start: 2024-08-07 | End: 2024-08-09 | Stop reason: HOSPADM

## 2024-08-06 RX ORDER — LEVETIRACETAM 500 MG/1
1000 TABLET ORAL EVERY EVENING
Status: DISCONTINUED | OUTPATIENT
Start: 2024-08-06 | End: 2024-08-09 | Stop reason: HOSPADM

## 2024-08-06 RX ORDER — POLYETHYLENE GLYCOL 3350 17 G/17G
17 POWDER, FOR SOLUTION ORAL DAILY
Status: DISCONTINUED | OUTPATIENT
Start: 2024-08-07 | End: 2024-08-09 | Stop reason: HOSPADM

## 2024-08-06 RX ORDER — NALOXONE HYDROCHLORIDE 0.4 MG/ML
0.4 INJECTION, SOLUTION INTRAMUSCULAR; INTRAVENOUS; SUBCUTANEOUS
Status: DISCONTINUED | OUTPATIENT
Start: 2024-08-06 | End: 2024-08-09 | Stop reason: HOSPADM

## 2024-08-06 RX ORDER — ONDANSETRON 4 MG/1
4 TABLET, ORALLY DISINTEGRATING ORAL EVERY 30 MIN PRN
Status: DISCONTINUED | OUTPATIENT
Start: 2024-08-06 | End: 2024-08-06

## 2024-08-06 RX ORDER — ONDANSETRON 4 MG/1
4 TABLET, ORALLY DISINTEGRATING ORAL EVERY 6 HOURS PRN
Status: DISCONTINUED | OUTPATIENT
Start: 2024-08-06 | End: 2024-08-09 | Stop reason: HOSPADM

## 2024-08-06 RX ORDER — NALOXONE HYDROCHLORIDE 0.4 MG/ML
0.4 INJECTION, SOLUTION INTRAMUSCULAR; INTRAVENOUS; SUBCUTANEOUS
Status: DISCONTINUED | OUTPATIENT
Start: 2024-08-06 | End: 2024-08-06

## 2024-08-06 RX ORDER — NALOXONE HYDROCHLORIDE 0.4 MG/ML
0.1 INJECTION, SOLUTION INTRAMUSCULAR; INTRAVENOUS; SUBCUTANEOUS
Status: DISCONTINUED | OUTPATIENT
Start: 2024-08-06 | End: 2024-08-06

## 2024-08-06 RX ORDER — FENTANYL CITRATE 50 UG/ML
25 INJECTION, SOLUTION INTRAMUSCULAR; INTRAVENOUS EVERY 5 MIN PRN
Status: DISCONTINUED | OUTPATIENT
Start: 2024-08-06 | End: 2024-08-06

## 2024-08-06 RX ORDER — HYDROMORPHONE HCL IN WATER/PF 6 MG/30 ML
0.4 PATIENT CONTROLLED ANALGESIA SYRINGE INTRAVENOUS EVERY 5 MIN PRN
Status: DISCONTINUED | OUTPATIENT
Start: 2024-08-06 | End: 2024-08-06

## 2024-08-06 RX ORDER — PROPOFOL 10 MG/ML
INJECTION, EMULSION INTRAVENOUS CONTINUOUS PRN
Status: DISCONTINUED | OUTPATIENT
Start: 2024-08-06 | End: 2024-08-06

## 2024-08-06 RX ORDER — HALOPERIDOL 5 MG/ML
1 INJECTION INTRAMUSCULAR
Status: DISCONTINUED | OUTPATIENT
Start: 2024-08-06 | End: 2024-08-06

## 2024-08-06 RX ORDER — PROTAMINE SULFATE 10 MG/ML
INJECTION, SOLUTION INTRAVENOUS PRN
Status: DISCONTINUED | OUTPATIENT
Start: 2024-08-06 | End: 2024-08-06

## 2024-08-06 RX ORDER — HYDROMORPHONE HYDROCHLORIDE 1 MG/ML
0.1 INJECTION, SOLUTION INTRAMUSCULAR; INTRAVENOUS; SUBCUTANEOUS
Status: DISCONTINUED | OUTPATIENT
Start: 2024-08-06 | End: 2024-08-09 | Stop reason: HOSPADM

## 2024-08-06 RX ORDER — LABETALOL HYDROCHLORIDE 5 MG/ML
INJECTION, SOLUTION INTRAVENOUS
Status: DISCONTINUED
Start: 2024-08-06 | End: 2024-08-07 | Stop reason: HOSPADM

## 2024-08-06 RX ORDER — LABETALOL 20 MG/4 ML (5 MG/ML) INTRAVENOUS SYRINGE
PRN
Status: DISCONTINUED | OUTPATIENT
Start: 2024-08-06 | End: 2024-08-06

## 2024-08-06 RX ORDER — PHENYLEPHRINE HCL IN 0.9% NACL 50MG/250ML
.1-6 PLASTIC BAG, INJECTION (ML) INTRAVENOUS CONTINUOUS
Status: DISCONTINUED | OUTPATIENT
Start: 2024-08-06 | End: 2024-08-07

## 2024-08-06 RX ORDER — ACETAMINOPHEN 325 MG/1
650 TABLET ORAL EVERY 4 HOURS PRN
Status: DISCONTINUED | OUTPATIENT
Start: 2024-08-09 | End: 2024-08-09 | Stop reason: HOSPADM

## 2024-08-06 RX ORDER — LIDOCAINE HYDROCHLORIDE 20 MG/ML
INJECTION, SOLUTION INFILTRATION; PERINEURAL PRN
Status: DISCONTINUED | OUTPATIENT
Start: 2024-08-06 | End: 2024-08-06

## 2024-08-06 RX ORDER — LABETALOL HYDROCHLORIDE 5 MG/ML
20 INJECTION, SOLUTION INTRAVENOUS EVERY 4 HOURS PRN
Status: DISCONTINUED | OUTPATIENT
Start: 2024-08-06 | End: 2024-08-09 | Stop reason: HOSPADM

## 2024-08-06 RX ORDER — HYDROMORPHONE HYDROCHLORIDE 1 MG/ML
0.2 INJECTION, SOLUTION INTRAMUSCULAR; INTRAVENOUS; SUBCUTANEOUS
Status: DISCONTINUED | OUTPATIENT
Start: 2024-08-06 | End: 2024-08-09 | Stop reason: HOSPADM

## 2024-08-06 RX ORDER — FENTANYL CITRATE 50 UG/ML
INJECTION, SOLUTION INTRAMUSCULAR; INTRAVENOUS PRN
Status: DISCONTINUED | OUTPATIENT
Start: 2024-08-06 | End: 2024-08-06

## 2024-08-06 RX ORDER — SODIUM CHLORIDE, SODIUM LACTATE, POTASSIUM CHLORIDE, CALCIUM CHLORIDE 600; 310; 30; 20 MG/100ML; MG/100ML; MG/100ML; MG/100ML
INJECTION, SOLUTION INTRAVENOUS CONTINUOUS PRN
Status: DISCONTINUED | OUTPATIENT
Start: 2024-08-06 | End: 2024-08-06

## 2024-08-06 RX ORDER — MAGNESIUM HYDROXIDE/ALUMINUM HYDROXICE/SIMETHICONE 120; 1200; 1200 MG/30ML; MG/30ML; MG/30ML
30 SUSPENSION ORAL EVERY 4 HOURS PRN
Status: DISCONTINUED | OUTPATIENT
Start: 2024-08-06 | End: 2024-08-09 | Stop reason: HOSPADM

## 2024-08-06 RX ORDER — DEXAMETHASONE SODIUM PHOSPHATE 4 MG/ML
INJECTION, SOLUTION INTRA-ARTICULAR; INTRALESIONAL; INTRAMUSCULAR; INTRAVENOUS; SOFT TISSUE PRN
Status: DISCONTINUED | OUTPATIENT
Start: 2024-08-06 | End: 2024-08-06

## 2024-08-06 RX ORDER — HEPARIN SODIUM 1000 [USP'U]/ML
INJECTION, SOLUTION INTRAVENOUS; SUBCUTANEOUS PRN
Status: DISCONTINUED | OUTPATIENT
Start: 2024-08-06 | End: 2024-08-06

## 2024-08-06 RX ORDER — ONDANSETRON 2 MG/ML
4 INJECTION INTRAMUSCULAR; INTRAVENOUS EVERY 6 HOURS PRN
Status: DISCONTINUED | OUTPATIENT
Start: 2024-08-06 | End: 2024-08-09 | Stop reason: HOSPADM

## 2024-08-06 RX ORDER — OXYCODONE HYDROCHLORIDE 5 MG/1
5 TABLET ORAL EVERY 4 HOURS PRN
Status: DISCONTINUED | OUTPATIENT
Start: 2024-08-06 | End: 2024-08-09 | Stop reason: HOSPADM

## 2024-08-06 RX ORDER — HYDROMORPHONE HCL IN WATER/PF 6 MG/30 ML
0.2 PATIENT CONTROLLED ANALGESIA SYRINGE INTRAVENOUS EVERY 5 MIN PRN
Status: DISCONTINUED | OUTPATIENT
Start: 2024-08-06 | End: 2024-08-06

## 2024-08-06 RX ORDER — CEFAZOLIN SODIUM/WATER 2 G/20 ML
2 SYRINGE (ML) INTRAVENOUS
Status: COMPLETED | OUTPATIENT
Start: 2024-08-06 | End: 2024-08-06

## 2024-08-06 RX ORDER — HEPARIN SODIUM 1000 [USP'U]/ML
INJECTION, SOLUTION INTRAVENOUS; SUBCUTANEOUS CONTINUOUS PRN
Status: DISCONTINUED | OUTPATIENT
Start: 2024-08-06 | End: 2024-08-06

## 2024-08-06 RX ORDER — ESMOLOL HYDROCHLORIDE 10 MG/ML
INJECTION INTRAVENOUS PRN
Status: DISCONTINUED | OUTPATIENT
Start: 2024-08-06 | End: 2024-08-06

## 2024-08-06 RX ORDER — ACETAMINOPHEN 325 MG/1
975 TABLET ORAL EVERY 8 HOURS
Status: COMPLETED | OUTPATIENT
Start: 2024-08-06 | End: 2024-08-09

## 2024-08-06 RX ADMIN — PHENYLEPHRINE HYDROCHLORIDE 50 MCG: 10 INJECTION INTRAVENOUS at 09:01

## 2024-08-06 RX ADMIN — LIDOCAINE HYDROCHLORIDE 70 MG: 20 INJECTION, SOLUTION INFILTRATION; PERINEURAL at 08:43

## 2024-08-06 RX ADMIN — SODIUM CHLORIDE, POTASSIUM CHLORIDE, SODIUM LACTATE AND CALCIUM CHLORIDE: 600; 310; 30; 20 INJECTION, SOLUTION INTRAVENOUS at 16:57

## 2024-08-06 RX ADMIN — ACETAMINOPHEN 975 MG: 325 TABLET, FILM COATED ORAL at 07:43

## 2024-08-06 RX ADMIN — HYDROMORPHONE HYDROCHLORIDE 0.1 MG: 1 INJECTION, SOLUTION INTRAMUSCULAR; INTRAVENOUS; SUBCUTANEOUS at 21:40

## 2024-08-06 RX ADMIN — HEPARIN SODIUM 1500 UNITS/HR: 1000 INJECTION, SOLUTION INTRAVENOUS; SUBCUTANEOUS at 11:08

## 2024-08-06 RX ADMIN — CEFAZOLIN 1 G: 1 INJECTION, POWDER, FOR SOLUTION INTRAMUSCULAR; INTRAVENOUS at 17:25

## 2024-08-06 RX ADMIN — SENNOSIDES AND DOCUSATE SODIUM 1 TABLET: 50; 8.6 TABLET ORAL at 19:45

## 2024-08-06 RX ADMIN — ONDANSETRON 4 MG: 2 INJECTION INTRAMUSCULAR; INTRAVENOUS at 15:23

## 2024-08-06 RX ADMIN — DEXAMETHASONE SODIUM PHOSPHATE 4 MG: 4 INJECTION, SOLUTION INTRA-ARTICULAR; INTRALESIONAL; INTRAMUSCULAR; INTRAVENOUS; SOFT TISSUE at 09:21

## 2024-08-06 RX ADMIN — ESMOLOL HYDROCHLORIDE 20 MG: 10 INJECTION, SOLUTION INTRAVENOUS at 08:49

## 2024-08-06 RX ADMIN — SODIUM CHLORIDE, POTASSIUM CHLORIDE, SODIUM LACTATE AND CALCIUM CHLORIDE: 600; 310; 30; 20 INJECTION, SOLUTION INTRAVENOUS at 08:33

## 2024-08-06 RX ADMIN — PROPOFOL 50 MG: 10 INJECTION, EMULSION INTRAVENOUS at 08:43

## 2024-08-06 RX ADMIN — PROTAMINE SULFATE 50 MG: 10 INJECTION, SOLUTION INTRAVENOUS at 15:19

## 2024-08-06 RX ADMIN — PHENYLEPHRINE HYDROCHLORIDE 50 MCG: 10 INJECTION INTRAVENOUS at 15:10

## 2024-08-06 RX ADMIN — SODIUM CHLORIDE, POTASSIUM CHLORIDE, SODIUM LACTATE AND CALCIUM CHLORIDE: 600; 310; 30; 20 INJECTION, SOLUTION INTRAVENOUS at 07:42

## 2024-08-06 RX ADMIN — LIDOCAINE: 40 CREAM TOPICAL at 21:40

## 2024-08-06 RX ADMIN — LEVETIRACETAM 1000 MG: 500 TABLET, FILM COATED ORAL at 19:45

## 2024-08-06 RX ADMIN — HEPARIN SODIUM 2000 UNITS: 1000 INJECTION INTRAVENOUS; SUBCUTANEOUS at 12:11

## 2024-08-06 RX ADMIN — ESMOLOL HYDROCHLORIDE 20 MG: 10 INJECTION, SOLUTION INTRAVENOUS at 08:45

## 2024-08-06 RX ADMIN — HEPARIN SODIUM 7000 UNITS: 1000 INJECTION INTRAVENOUS; SUBCUTANEOUS at 11:08

## 2024-08-06 RX ADMIN — FENTANYL CITRATE 100 MCG: 50 INJECTION INTRAMUSCULAR; INTRAVENOUS at 08:43

## 2024-08-06 RX ADMIN — PHENYLEPHRINE HYDROCHLORIDE 0.3 MCG/KG/MIN: 10 INJECTION INTRAVENOUS at 08:53

## 2024-08-06 RX ADMIN — PROPOFOL 150 MCG/KG/MIN: 10 INJECTION, EMULSION INTRAVENOUS at 08:52

## 2024-08-06 RX ADMIN — Medication 2 G: at 14:01

## 2024-08-06 RX ADMIN — REMIFENTANIL HYDROCHLORIDE 0.08 MCG/KG/MIN: 1 INJECTION, POWDER, LYOPHILIZED, FOR SOLUTION INTRAVENOUS at 08:57

## 2024-08-06 RX ADMIN — Medication 0.4 MCG/KG/MIN: at 23:38

## 2024-08-06 RX ADMIN — HEPARIN SODIUM 2000 UNITS: 1000 INJECTION INTRAVENOUS; SUBCUTANEOUS at 12:41

## 2024-08-06 RX ADMIN — FENTANYL CITRATE 50 MCG: 50 INJECTION INTRAMUSCULAR; INTRAVENOUS at 08:42

## 2024-08-06 RX ADMIN — Medication 2 G: at 09:54

## 2024-08-06 RX ADMIN — ACETAMINOPHEN 975 MG: 325 TABLET, FILM COATED ORAL at 17:24

## 2024-08-06 RX ADMIN — SUCCINYLCHOLINE CHLORIDE 80 MG: 20 INJECTION, SOLUTION INTRAMUSCULAR; INTRAVENOUS; PARENTERAL at 08:49

## 2024-08-06 RX ADMIN — LABETALOL 20 MG/4 ML (5 MG/ML) INTRAVENOUS SYRINGE 5 MG: at 16:01

## 2024-08-06 RX ADMIN — PHENYLEPHRINE HYDROCHLORIDE 100 MCG: 10 INJECTION INTRAVENOUS at 11:10

## 2024-08-06 RX ADMIN — FENTANYL CITRATE 100 MCG: 50 INJECTION INTRAMUSCULAR; INTRAVENOUS at 08:38

## 2024-08-06 RX ADMIN — PROPOFOL 30 MG: 10 INJECTION, EMULSION INTRAVENOUS at 08:46

## 2024-08-06 ASSESSMENT — VISUAL ACUITY
OU: NORMAL ACUITY

## 2024-08-06 ASSESSMENT — ACTIVITIES OF DAILY LIVING (ADL)
ADLS_ACUITY_SCORE: 20
WALKING_OR_CLIMBING_STAIRS_DIFFICULTY: YES
HEARING_DIFFICULTY_OR_DEAF: NO
DIFFICULTY_COMMUNICATING: NO
CHANGE_IN_FUNCTIONAL_STATUS_SINCE_ONSET_OF_CURRENT_ILLNESS/INJURY: NO
CONCENTRATING,_REMEMBERING_OR_MAKING_DECISIONS_DIFFICULTY: NO
ADLS_ACUITY_SCORE: 28
ADLS_ACUITY_SCORE: 20
ADLS_ACUITY_SCORE: 20
DIFFICULTY_EATING/SWALLOWING: NO
ADLS_ACUITY_SCORE: 20
ADLS_ACUITY_SCORE: 20
ADLS_ACUITY_SCORE: 28
FALL_HISTORY_WITHIN_LAST_SIX_MONTHS: YES
EQUIPMENT_CURRENTLY_USED_AT_HOME: CANE, STRAIGHT;WALKER, ROLLING
TOILETING_ISSUES: NO
ADLS_ACUITY_SCORE: 28
ADLS_ACUITY_SCORE: 20
WEAR_GLASSES_OR_BLIND: NO
DRESSING/BATHING_DIFFICULTY: NO
ADLS_ACUITY_SCORE: 21
DOING_ERRANDS_INDEPENDENTLY_DIFFICULTY: NO
ADLS_ACUITY_SCORE: 28
WALKING_OR_CLIMBING_STAIRS: AMBULATION DIFFICULTY, REQUIRES EQUIPMENT
ADLS_ACUITY_SCORE: 20
ADLS_ACUITY_SCORE: 28
ADLS_ACUITY_SCORE: 20
NUMBER_OF_TIMES_PATIENT_HAS_FALLEN_WITHIN_LAST_SIX_MONTHS: 1
ADLS_ACUITY_SCORE: 20

## 2024-08-06 NOTE — ANESTHESIA PROCEDURE NOTES
Central Line/PA Catheter Placement    Pre-Procedure   Staff -        Anesthesiologist:  Enzo Mederos MD       Resident/Fellow: Don Stover MD       Other Anesthesia Staff: Felipe Evans MD       Performed By: resident and with residents       Procedure performed by resident/fellow/CRNA in presence of a teaching physician.         Location: OR       Pre-Anesthestic Checklist: patient identified, IV checked, site marked, risks and benefits discussed, informed consent, monitors and equipment checked, pre-op evaluation and at physician/surgeon's request  Timeout:       Correct Patient: Yes        Correct Procedure: Yes        Correct Site: Yes        Correct Position: Yes        Correct Laterality: Yes   Line Placement:   This line was placed Post Induction    Procedure   Procedure: central line       Laterality: right       Insertion Site: internal jugular.       Patient Position: Trendelenburg  Sterile Prep        All elements of maximal sterile barrier technique followed       Patient Prep/Sterile Barriers: draped, hand hygiene, gloves , hat , mask , draped, gown, sterile gel and probe cover       Skin prep: Chloraprep  Insertion/Injection        Technique: ultrasound guided        1. Ultrasound was used to evaluate the access site.       2. Vein evaluated via ultrasound for patency/adequacy.       3. Using real-time ultrasound the needle/catheter was observed entering the artery/vein.       Introducer Type: 9 Fr, 2-lumen MAC        Type: Introducer       Number of Lumens: double lumen  Narrative         Secured by: suture       Tegaderm and Biopatch dressing used.       Complications: None apparent,        blood aspirated from all lumens,        All lumens flushed: Yes       Verification method: Ultrasound and Placement to be verified post-op   Comments:  With hands free triple lumen placed into introducer

## 2024-08-06 NOTE — BRIEF OP NOTE
[facility]    Brief Operative Note    Pre-operative diagnosis: Celiac artery aneurysm (H24) [I72.8]  Post-operative diagnosis Same as pre-operative diagnosis    Procedure:  Ultrasound-guided bilateral percutaneous transfemoral access  Endovascular repair of celiac artery aneurysm with physician-modified four-vessel endograft  Celiac artery balloon-expandable stent placement   Superior mesenteric artery balloon-expandable stent placement   Right renal artery artery balloon-expandable stent and bare metal self-expandable stent placement   Left renal artery balloon-expandable stent placement   Percutaneous closure of bilateral common femoral arteriotomies with Perclose closure devices    Surgeon: Celestine  Fellow: Chicho  Resident: Horacio    Anesthesia: General   Estimated Blood Loss: 100 ml    Drains: None    Specimens: None  Findings:  1) Four-vessel physician-modified Cook Zenith Alpha stent graft, 30 x 26 x 108 mm, ipsi left   2) 10 x 38 mm iCast in celiac, 6 x 22 iCast in SMA and each of the renals  3) Right renal artery kink adjacent to distal iCast stent, treated with 6 x 40 mm Eluvea stent  4) No endoleak on completion rotation CT angiogram  5) Palpable bilateral dorsalis pedis pulses at case completion      Complications: None.  Implants:   Implant Name Type Inv. Item Serial No.  Lot No. LRB No. Used Action   Zenith Alpha - Thoracic Endovascular Graft Proximal Tapered Component Graft   Zeebo [id number] N/A 1 Implanted   iCast Covered Stent System Stent  [id number] GETINGE  N/A 1 Implanted   icast covered stent system Stent  [id number] GETINGE  N/A 1 Implanted   icast covered stent system Stent  [id number] GETINGE  Right 1 Implanted   STENT ENDOPROSTH PTFE ICAST 6FR [id number] - [id number] Stent STENT ENDOPROSTH PTFE ICAST 6FR [id number] [employer]  N/A 1 Implanted   STENT EVERFLEX SE [id number] - [id number]  Stent PV STENT EVERFLEX SE [id number]  [employer] [id number] Right 1 Implanted     Plan:  - MAP goal >75 mmHg  - bed rest tonight, head of bed < 30 deg  - Q1H neuro checks and pulse checks - must evaluate hip flexion with neuro checks  - multimodal pain control  - Q6H labs  - npo tonight  - rodriguez catheter  - IVF

## 2024-08-06 NOTE — ANESTHESIA PROCEDURE NOTES
Arterial Line Procedure Note    Pre-Procedure   Staff -        Anesthesiologist:  Enzo Mederos MD       Resident/Fellow: Don Stover MD       Other Anesthesia Staff: Felipe Evans MD       Performed By: resident       Location: OR       Pre-Anesthestic Checklist: patient identified, IV checked, risks and benefits discussed, informed consent, monitors and equipment checked, pre-op evaluation and at physician/surgeon's request  Timeout:       Correct Patient: Yes        Correct Procedure: Yes        Correct Site: Yes        Correct Position: Yes   Line Placement:   This line was placed Post Induction  Procedure   Procedure: arterial line       Laterality: left       Insertion Site: radial.  Sterile Prep        Standard elements of sterile barrier followed       Skin prep: Chloraprep  Insertion/Injection        Technique: ultrasound guided, Seldinger Technique and Nasir's test completed        1. Ultrasound was used to evaluate the access site.       2. Artery evaluated via ultrasound for patency/adequacy.       3. Using real-time ultrasound the needle/catheter was observed entering the artery/vein.       Catheter Type/Size: 20 G, 12 cm  Narrative         Secured by: suture       Tegaderm dressing used.       Complications: None apparent,        Arterial waveform: Yes

## 2024-08-06 NOTE — ANESTHESIA PROCEDURE NOTES
Airway       Patient location during procedure: OR       Procedure Start/Stop Times: 8/6/2024 8:50 AM  Staff -        Anesthesiologist:  Enzo Mederos MD       Resident/Fellow: Don Stover MD       Other Anesthesia Staff: Felipe Evans MD       Performed By: resident  Consent for Airway        Urgency: elective  Indications and Patient Condition       Indications for airway management: jose-procedural       Induction type:intravenous       Mask difficulty assessment: 1 - vent by mask    Final Airway Details       Final airway type: endotracheal airway       Successful airway: ETT - single  Endotracheal Airway Details        ETT size (mm): 8.0       Cuffed: yes       Successful intubation technique: video laryngoscopy       VL Blade Size: Glidescope 4       Grade View of Cords: 1       Adjucts: stylet       Position: Right       Measured from: gums/teeth       Secured at (cm): 23       Bite block used: Soft    Post intubation assessment        Placement verified by: capnometry, equal breath sounds and chest rise        Number of attempts at approach: 1       Secured with: tape       Ease of procedure: easy       Dentition: Intact    Medication(s) Administered   Medication Administration Time: 8/6/2024 8:50 AM

## 2024-08-06 NOTE — ANESTHESIA POSTPROCEDURE EVALUATION
Patient: Trevor Larios    Procedure: Procedure(s):  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  Stenting of the celiac with 10x38 icast, SMA with 6x22 icast, right renal artery with 6x22 icast, left renal artery wiht 6x22 icast. Cone beam CT, rotational DSA, ultrasound guided bilateral percutaneous femoral access. 20 fr sheath on right and 18 fr sheath on left  with physician modified endograft.       Anesthesia Type:  General    Note:  Disposition: Inpatient; ICU            ICU Sign Out: Anesthesiologist/ICU physician sign out WAS performed   Postop Pain Control: Uneventful            Sign Out: Well controlled pain   PONV: No   Neuro/Psych: Uneventful            Sign Out: Acceptable/Baseline neuro status   Airway/Respiratory: Uneventful            Sign Out: Acceptable/Baseline resp. status   CV/Hemodynamics: Uneventful            Sign Out: Acceptable CV status; No obvious hypovolemia; No obvious fluid overload   Other NRE: NONE   DID A NON-ROUTINE EVENT OCCUR? No           Last vitals:  Vitals:    08/06/24 0652 08/06/24 0732   BP:  (!) 116/100   Pulse: 99    Resp: 18 20   Temp:  36.3  C (97.4  F)   SpO2:  97%       Electronically Signed By: Julio Paulino MD  August 6, 2024  6:14 PM

## 2024-08-06 NOTE — H&P
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 The patient has a past medical history of Crohn's disease previously taking immodium, seizure disorder on Keppra, chronic fibrotic interstitial lung disease, chronic macrocytic anemia (most recent preop hgb 13.2), cognitive impairment, subclinical CAD, mild bilateral renal artery stenosis, and resolved C.diff colitis. The patient underwent a PMEG endovascular repair of aortic and celiac pseudoaneurysm with 4 vessel physician modified fenestrated-branched endograft with fenestrations for the celiac, superior mesenteric, and bilateral renal arteries on 08/06/24.         OR:  2400cc crystalloid  600cc urine output       Plan: Critical Care   Neuro/ pain/ sedation:  # acute pain  # Mild Cognitive Impairment  # Idiopathic Seizure Disorder with Breakthrough Seizures  - Monitor neurological status. Notify provider for any acute changes in exam.  -Scheduled Tylenol  - PRN oxycodone and dilaudid.  -Neurological checks every hour   -Patient must be able to flex at hip  - Keppra 750mg morning, Keppra 1000mg in the evening.  - Head of bed <30 degrees    Pulmonary:  # Chronic Fibrotic Interstitial Lung Disease  - supplemental oxygen to keep saturation about 92%  -Oxymask 2L.  -ABG every 6 hours for 48 hours  -Incentive spirometry  -Pulmonary toilet    Cardiovascular:  # Subclinical CAD   # Abdominal Aortic Aneurysm   # Celiac artery aneurysm  # s/p PMEG 08/06/24  - Monitor hemodynamic status.  -Flat bedrest for 6 hours. Bedrest until 8/7 AM  -Access site checks every hour  -Doppler/vascular checks every hour  -Daily aspirin 81mg starting  8/7 AM  -Cardiac monitoring  -MAP goal 75 mmHg, <160mmHg    GI/Nutrition:  # Crohn's Disease   # Hx C. Diff Colitis  - Diet: NPO per Anesthesia Guidelines for Procedure/Surgery Except for: Meds    - Nutrition consulted. Appreciate recommendations.   - Plan for ADAT with clears on 8/7  -Senna-docusate and miralax scheduled  - LFTs post-op and in the AM (8/7)    Renal/ Fluid Balance:   # Bilateral Mild Renal Artery Stenosis  - Will monitor intake and output  - LR @ 100cc for IV fluid hydration  - ICU electrolyte replacement protocol  - Lactate every 6 hours for 48 hours  - Sneed catheter to be discontinued by vascular surgery team.    Endocrine:  # Chronic B12 Deficiency  #Stress induced Hyperglycemia  - Blood sugar levels below 180.    ID:  #Perioperative antimicrobial prophylaxis  - 2g Cefazolin every eight hours for 2 additional doses.    Hematology:  # Chronic Macrocytic Anemia  - CBC, fibrinogen, INR, PTT every 6 hours for 48 hours  - Hgb > 9, Plt >100, Fibrinogen <200, INR <1.5    MSK:   - PT and OT consulted. Appreciate recommendations.  Activity: Flat bedrest 6 hours post-op. Bedrest until 8/7 AM.   -Head of bed <30 degrees       Lines/ tubes/ drains:  - MAC RIJ  -Left radial Arterial line  -Sneed catheter  -PIV Left upper forearm and hand   Sneed Catheter: PRESENT, indication:    Lines: PRESENT           Prophylaxis:  - DVT Prophylaxis: Pneumatic Compression Devices  - PUD Prophylaxis: None    Code Status:  Full code    Disposition:  - ICU      ______________________________________________________________________    Chief Complaint   Celiac Artery Aneurysm.    History is obtained from the patient and electronic health record    History of Present Illness   Trevor Larios is a 73 year old male who initially presented to vascular surgery clinic on April 29, 2020 for.  The patient has a past medical history significant for mild cognitive impairment, CAD, COPD, malnutrition, frailty, seizures and does not  "smoker.  The patient at the time was being worked up for a pseudoaneurysm of the proximal celiac artery and aortic wall.  He was previously being evaluated by the [facility] but was found to be too high risk.  That time was decided the patient would need to undergo further medical episodes sensation prior to moving toward procedure.  The patient followed up with vascular surgery clinic on 2024.  At that time the patient was found to be gaining weight, feeling stronger.  He was elected to proceed with endovascular repair of the aortic and celiac pseudoaneurysm with four-vessel physician modified fenestrated branched endograft with fenestrations for the segment, superior mesenteric artery and bilateral renal arteries.  The patient underwent this procedure on 2024 and was admitted to the surgical intensive care unit for further cares.    Review of Systems    The 10 point Review of Systems is negative other than noted in the HPI or here.     Past Medical History    I have reviewed this patient's medical history and updated it with pertinent information if needed.   Past Medical History:   Diagnosis Date    Anemia     Celiac artery aneurysm (H24)     Coronary artery calcification     Crohn's disease (H)     Dementia (H)     \"moderate.\"    Seizure disorder (H)        Past Surgical History   I have reviewed this patient's surgical history and updated it with pertinent information if needed.  Past Surgical History:   Procedure Laterality Date    COLONOSCOPY      ORIF HIP FRACTURE      SHOULDER SURGERY Right     ?    TOE SURGERY         Social History   I have reviewed this patient's social history and updated it with pertinent information if needed.  Social History     Tobacco Use    Smoking status: Former     Current packs/day: 0.00     Types: Cigarettes     Quit date:      Years since quittin.5    Smokeless tobacco: Never   Vaping Use    Vaping status: Never Used "   Substance Use Topics    Alcohol use: Not Currently    Drug use: Not Currently       Family History       Prior to Admission Medications   Prior to Admission Medications   Prescriptions Last Dose Informant Patient Reported? Taking?   Vitamin D, Cholecalciferol, 25 MCG (1000 UT) CAPS  Other No No   Sig: Take 1,000 mcg by mouth daily   aspirin (ASA) 81 MG chewable tablet   No No   Sig: Take 1 tablet (81 mg) by mouth every evening   calcium carbonate (TUMS) 500 MG chewable tablet   Yes No   Sig: Take 1 chew tab by mouth 2 times daily as needed for heartburn   cyanocobalamin (VITAMIN B-12) 500 MCG SUBL sublingual tablet   No No   Sig: Place 2 tablets (1,000 mcg) under the tongue daily   ferrous sulfate (FEROSUL) 325 (65 Fe) MG tablet   No No   Sig: Take 1 tablet (325 mg) by mouth daily   levETIRAcetam (KEPPRA) 1000 MG tablet   No No   Sig: Take 1 tablet (1,000 mg) by mouth every evening   levETIRAcetam (KEPPRA) 1000 MG tablet   No No   Sig: Take 1 tablet (1,000 mg) by mouth every evening   Patient not taking: Reported on 7/23/2024   levETIRAcetam (KEPPRA) 750 MG tablet 8/6/2024  No Yes   Sig: Take 1 tablet (750 mg) by mouth every morning   levETIRAcetam (KEPPRA) 750 MG tablet   No No   Sig: Take 1 tablet (750 mg) by mouth every morning   Patient not taking: Reported on 7/23/2024   loperamide (IMODIUM A-D) 2 MG tablet   No No   Sig: Take 1 tablet (2 mg) by mouth 4 times daily as needed for diarrhea   midodrine (PROAMATINE) 5 MG tablet 8/6/2024  No Yes   Sig: Take 2 tablets (10 mg) by mouth 3 times daily   multivitamin w/minerals (THERA-VIT-M) tablet   No No   Sig: Take 1 tablet by mouth daily   thiamine (B-1) 100 MG tablet  Other No No   Sig: Take 1 tablet (100 mg) by mouth daily   vitamin C (ASCORBIC ACID) 500 MG tablet  Other No No   Sig: Take 1 tablet (500 mg) by mouth daily      Facility-Administered Medications: None     Allergies   No Known Allergies    Physical Exam   Vital Signs: Temp: 97.4  F (36.3  C) Temp  src: Oral BP: (!) 116/100 Pulse: 99   Resp: 20 SpO2: 97 % O2 Device: None (Room air)    Weight: 147 lbs .75 oz    General: Lying in bed, Drowsy, but follows commands.   HEENT: MAC line in RIJ  Neuro: Squeezes hands and flexes both hips to command.  CV: Regular rate and rhythm via telemetry  Pulm: 2L via oxymask. Non-labored breathing.   Abd: soft, non-distended, non-tender.  : Sneed with clear, yellow urine  Extremities: Warm and well perfused  Skin: Warm and well perfused.  Incisions: Access sites are clean, dry and intact with dermabond over the incisions.  Vascular:   B/l groins soft, no hematoma, puncture sites c/d/I. Palpable bilateral DP and PT pulses, feet warm, normal cap refill    Data   I reviewed all medications, new labs and imaging results over the last 24 hours.  Arterial Blood Gases   Recent Labs   Lab 08/06/24  1644   PH 7.39   PCO2 39   PO2 123*   HCO3 24       Complete Blood Count   Recent Labs   Lab 08/06/24  0735   WBC 6.2   HGB 13.2*          Basic Metabolic Panel  Recent Labs   Lab 08/06/24  0735      POTASSIUM 4.5   CHLORIDE 103   CO2 24   BUN 22.2   CR 1.30*   GLC 85       Liver Function Tests  Recent Labs   Lab 08/06/24  1643   INR 1.14       Pancreatic Enzymes  No lab results found in last 7 days.    Coagulation Profile  Recent Labs   Lab 08/06/24  1643   INR 1.14   PTT 40*       IMAGING:  No results found for this or any previous visit (from the past 24 hour(s)).

## 2024-08-06 NOTE — ANESTHESIA CARE TRANSFER NOTE
Patient: Trevor Larios    Procedure: Procedure(s):  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  Stenting of the celiac with 10x38 icast, SMA with 6x22 icast, right renal artery with 6x22 icast, left renal artery wiht 6x22 icast. Cone beam CT, rotational DSA, ultrasound guided bilateral percutaneous femoral access. 20 fr sheath on right and 18 fr sheath on left  with physician modified endograft.       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    Anesthesia Type:   General     Note:    Oropharynx: oropharynx clear of all foreign objects and spontaneously breathing  Level of Consciousness: awake  Oxygen Supplementation: face mask  Level of Supplemental Oxygen (L/min / FiO2): 4  Independent Airway: airway patency satisfactory and stable  Dentition: dentition unchanged  Vital Signs Stable: post-procedure vital signs reviewed and stable  Report to RN Given: handoff report given  Patient transferred to: ICU    ICU Handoff: Call for PAUSE to initiate/utilize ICU HANDOFF, Identified Patient, Identified Responsible Provider, Reviewed the Pertinent Medical History, Discussed Surgical Course, Reviewed Intra-OP Anesthesia Management and Issues during Anesthesia, Set Expectations for Post Procedure Period and Allowed Opportunity for Questions and Acknowledgement of Understanding      Vitals:  Vitals Value Taken Time   /68    Temp     Pulse 75 08/06/24 1645   Resp 14    SpO2 100 % 08/06/24 1645   Vitals shown include unfiled device data.    Electronically Signed By: YEIMY Emerson CRNA  August 6, 2024  4:46 PM

## 2024-08-06 NOTE — OP NOTE
VASCULAR SURGERY OPERATIVE REPORT     LOCATION:    [facility]    Trevor Larios  Medical Record #:  [id number]  YOB: 1950  Age:  73 year old     Date of Service: 8/6/2024     Preoperative diagnosis    #1- Celiac and aortic pseudoaneurysm  #2- Coronary artery disease  #3- COPD  #4- Seizure Disorder  #5- Crohn's Disease  #6- Malnutrition  #7- Severe frailty with failure to thrive recently  #8- C. Diff infection, now treated  #9- Unspecified dementia with mild to moderate cognitive impairment (MOCA 14-21/30)  #10- Multiple visits the emergency room    Postoperative diagnosis    #1- Celiac and aortic pseudoaneurysm  #2- Coronary artery disease  #3- COPD  #4- Seizure Disorder  #5- Crohn's Disease  #6- Malnutrition  #7- Severe frailty with failure to thrive recently  #8- C. Diff infection, now treated  #9- Unspecified dementia with mild to moderate cognitive impairment (MOCA 14-21/30)  #10- Multiple visits the emergency room    Surgeon: Shine Sprague MD  First Assistant: Alec Valentino MD (PGY 6 vascular surgery fellow)  A first assistant actively participated and was necessary for one or more of the following: opening, exposure and visualization during the case, maintaining hemostasis, wound closure resulting in its safe and expeditious completion.   Other Assistant: Melchor Leonard MD (PGY 5 vascular surgery resident)    Procedures:  Endovascular repair of complex aortic and celiac pseudoaneurysm using patient-specific, physician modified fenestrated branched stent-graft (proximal component with four fenestrations for the celiac axis, SMA and bilateral renal arteries).  Total bilateral percutaneous femoral approach using preclosure technique with Perclose devices, 20-Fr sheath on the right groin, 18-Fr sheath on the left.  Celiac axis branched stent graft using 10x38 mm iCAST covered stent-graft.  SMA branched stent graft using 6x22 mm iCAST covered  stent-graft.  Right renal artery fenestrated-branched stent graft using 6x22 mm iCAST covered stent-graft.  Left renal artery fenestrated-branched stent graft using 6x22 mm iCAST covered stent-graft.  Balloon dilatation of proximal zone for balloon bounce technique.  Onlay fusion of computed tomography angiography.  Selective celiac axis angiography.  Selective superior mesenteric artery (SMA) angiography.  Selective right renal angiography.  Selective left renal angiography.  Rotational digital subtraction angiography of the aorta and iliac arteries  Cone Beam Computed Tomography (CBCT) angiography with and without contrast-enhancement and radiologic interpretation.   Intraoperative neuromonitoring using motor evoked and somatosensory evoked potentials.    Findings:   Successful cannulation of the celiac, SMA, and bilateral renal arteries. Right renal artery extended due to kink at the distal right renal artery stent. No type I or III endoleaks at completion     Indication for procedure:    Mr. Larios is a 73 year old male with a history of celiac and aortic pseudoaneurysm that showed no evidence of infection on PET CT. Risks, benefits, alternatives and indications including but not limited to the risk of death, anesthetic or cardiopulmonary complications, bleeding, infection, myocardial infarction, stroke, renal insufficiency requiring temporary or permanent dialysis, bowel infarction necessitating bowel resection and colostomy, vessel injury, dissection, distal embolization, perforation, worsening ischemia with either compartment syndrome or limb loss and spinal cord injury.  Discussed possibility of interval aortic events including rupture.  Discussed the potential need for bank blood products, advanced directives, and the need for a team approach as well as the potential for medical photography. The patient and his daughter understand the risks and would like to proceed with endovascular repair of his complex  celiac and abdominal aortic pseudoaneurysm.  he also consented for therapeutic CSF drain if indicated.    Description of procedure:    Operative details:    The patient was brought to the operating room.   Under satisfactory general anesthesia, he was placed in supine arms up position with all pressure points padded in standard fashion.  The upper extremity, chest, abdomen, groins, and thighs were widely prepped and draped in sterile, standard fashion.  A surgical pause was performed with all members of the surgical team to verify patient, medical record number, birth date, planned surgical procedure, surgical site, allergies, fire risk and perioperative antibiotics.    A [id number] mm Cook Alpha stent graft was modified on the back table under sterile technique.  Four reinforced fenestrations were created for celiac, SMA, bilateral renal arteries. Diameter reducing ties, radiopaque markers were utilized in the modification.  The device was re-sheathed under sterile conditions.    Under realtime ultrasound guidance, percutaneous bilateral retrograde transfemoral access was established with a micropuncture set and exchanged for 0.035-inch system.  The systems were upsized to a 6 Salvadorean sheath. The patient was systemically heparinized with 7,000 units of intravenous heparin followed by initiation of a heparin infusion. An ACT greater than 250 seconds was obtained. The bilatearl femoral puncture site was preclosed using two Perclose devices and an 8 Fr sheath was placed.  Lunderquist wires were advanced to the thoracic aorta under the protection of Kumpe catheters. Ultimately a 20-Fr sheath was advanced via the right femoral approach into the abdominal aorta and an 18-Fr sheath was advanced via the left femoral artery approach. One of the renal arteries was catheterized via the femoral approach to assist with calibration of onlay fusion of computed tomography angiography. Once the target vessels were located, the  fenestrated device was oriented extracorporeally and introduced via the left femoral approach.  The device was aligned with the fenestrations and ostia of target vessels, and standard deployment from cranial to caudal was performed.  Celiac, SMA, bilateral renal arteries were selectively catheterized.  Given the large pseudoaneurysm above the celiac artery there was reflux of the sheath into the aorta which buckled the wires within the sheath the SMA, and bilateral renal arteries were re catheterized and sheathes placed in the celiac and SMA.   The device was completely deployed and ballooned with a Coda balloon proximally to advance the sheath into the celiac artery using balloon bounce technique. A selective angiogram was performed to locate the bifurcation of the celiac axis. Repair was then performed into each of the target vessels, starting with the celiac axis which received a 10x38 mm iCAST stent; The stent was flared with 10 mm balloon.  Completion angiogram demonstrated widely patent stent with no evidence of dissection, endoleak or vessel rupture.  Similar procedure was performed in the SMA, right and left renal arteries, which were stented with 6x22 mm, 6x22 mm and 6x22 mm iCAST stents, respectively.  Completion angiogram demonstrated widely patent stents with no evidence of dissection, endoleak or vessel rupture.        Final rotational completion digital subtraction angiography of the thoracoabdominal aorta and iliac arteries revealed widely patent celiac axis, SMA, bilateral renal arteries, main body, and iliac limbs with patent branches and no evidence of type I or III endoleak. A cone beam computed tomography with and without contrast enhancement was analyzed in axial, coronal and sagittal views and revealed no technical defects, patent branches and no evidence of type I or III endoleak. There was a kink at the distal right renal artery stent so this was recatheterized and extending with a 6x40 mm  protege everflex stent which was post dilated with a 5 mm balloon. Angiogram demonstrated resolution of the kink All sheaths and wires were removed.  The Perclose devices were tightened and were hemostatic.  All needle and sponge counts were correct. Groin incisions were closed using running Monocryl sutures for the subcuticular layer. A sterile dressing was applied.       The patient was awoken in the operating room and was able to move all 4 extremities to command with intact proximal strength. The patient had Doppler signals, stable from preoperative baseline. The patient was extubated and transferred to the ICU in stable condition.      Contrast: 134 mL  Flouro time: 84.7 min  Radiation: 3758 mGy    Estimated blood loss: 100 cc    Specimens: none     Complications: None apparent    Plan:  -ASA 81 mg  -Flat for 6 hours, followed by bedrest  -Site checks  -Neurovascular checks, must be able to lift legs off bed  -MAP goal: >75  -q6 CBC, BMP, Lactate, Fibrinogen, PT/INR for 48 hours  -LFTs now and in AM  -Hgb > 9, Plt >100, Fibrinogen <200, INR <1.5  -Spinal drain at bedside          Shine Sprague MD, [facility]  VASCULAR AND ENDOVASCULAR SURGERY   [facility] Vascular Surgery

## 2024-08-06 NOTE — PROGRESS NOTES
"Post-op check    POD#0 s/p 4 vessel PMEG for celiac artery aneurysm.    Seen in ICU. Drowsy but following commands.    BP (!) 116/100 (Cuff Size: Adult Regular)   Pulse 99   Temp 97.4  F (36.3  C) (Oral)   Resp 20   Ht 1.753 m (5' 9\")   Wt 66.7 kg (147 lb 0.8 oz)   SpO2 97%   BMI 21.72 kg/m      Squeezes hands and flexes both hips to command  B/l groins soft, no hematoma, puncture sites c/d/I  Palpable bilateral DP and PT pulses, feet warm, normal cap refill    Lactate normal  Awaiting post-op labs    A/P: Recovering on expected course.  - bed rest tonight, head of bed < 30 deg x 6 hr  - NPO  - IVF  - MAP goal 75 mmHg  - multimodal pain control  - post-op abx  - aspirin tomorrow  - q1h neuro and pulse checks, routine site checks  - q6h labs    Melchor Leonard MD    "

## 2024-08-06 NOTE — H&P
"Interval History & Physical:    Chief complaint: aortic aneurysm    Patient s/e this AM, denies any changes to H&P since last performed.   Has been NPO since midnight.   No anticoagulants taken since before midnight.  AFVSS. Palpable femoral/DP/PT pulses bilaterally. No groin or thoracoabdominal scars. Abdomen soft, NTND.  Bilateral groins marked.  Consented.  All questions and concerns addressed and patient demonstrates understanding.  To OR today for PMEG.       Gisele Valentino MD  PGY-6  Vascular Surgery  Pager: #[phone]  8/6/2024  7:02 AM      Vascular & Endovascular Surgery Clinic Return Visit   Vascular Surgeon: Shine Sprague MD, RPVI     Location:  [facility]     Trevor Larios  Medical Record #:  [id number]  YOB: 1950  Age:  73 year old      Date of Service: 7/1/2024     Primary Care Provider: Lyly Crouch     Chief Complaint/Reason for Visit: Follow up of celiac artery aneurysm     Interval History:  Mr. 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     The patient feels better now, gaining weight.  Patient feels stronger.  No worsening abdominal pain.  He is Crohn's symptoms are well-controlled with minimal diarrhea.  No acute flareups noted in recent times.     The patient's daughter reports that he has been denied to see gastroenterologist due to the lack of diagnosis at this time.        Review of Systems:    A 12 point ROS was reviewed and is negative except for symptoms noted in HPI.      Medical/Surgical History:    Past Medical History        Past Medical History:   Diagnosis Date    Crohn's disease (H)      Dementia (H) 2023     \"moderate.\"                       Past Surgical History         Past Surgical History:   Procedure Laterality Date    ORIF HIP FRACTURE   2022    SHOULDER SURGERY Right       2022?            Allergies:     Allergies   No Known Allergies         Medications:  "   Current Facility-Administered Medications          Current Outpatient Medications   Medication Sig Dispense Refill    aspirin (ASA) 81 MG chewable tablet Take 1 tablet (81 mg) by mouth every evening 90 tablet 0    cyanocobalamin (VITAMIN B-12) 500 MCG SUBL sublingual tablet Place 2 tablets (1,000 mcg) under the tongue daily 180 tablet 0    DIFICID 200 MG tablet TAKE 1 TABLET(200 MG) BY MOUTH TWICE DAILY 20 tablet 0    ferrous sulfate (FEROSUL) 325 (65 Fe) MG tablet Take 1 tablet (325 mg) by mouth daily 30 tablet 0    fidaxomicin (DIFICID) 200 MG tablet Take 1 tablet (200 mg) by mouth 2 times daily 30 tablet 0    levETIRAcetam (KEPPRA) 1000 MG tablet Take 1 tablet (1,000 mg) by mouth every evening 90 tablet 0    levETIRAcetam (KEPPRA) 1000 MG tablet Take 1 tablet (1,000 mg) by mouth every evening 90 tablet 0    levETIRAcetam (KEPPRA) 750 MG tablet Take 1 tablet (750 mg) by mouth every morning 30 tablet 1    levETIRAcetam (KEPPRA) 750 MG tablet Take 1 tablet (750 mg) by mouth every morning 30 tablet 1    loperamide (IMODIUM A-D) 2 MG tablet Take 1 tablet (2 mg) by mouth 4 times daily as needed for diarrhea 20 tablet 2    midodrine (PROAMATINE) 5 MG tablet Take 2 tablets (10 mg) by mouth 3 times daily 270 tablet 11    multivitamin w/minerals (THERA-VIT-M) tablet Take 1 tablet by mouth daily 30 tablet 0    thiamine (B-1) 100 MG tablet Take 1 tablet (100 mg) by mouth daily 90 tablet 3    vitamin C (ASCORBIC ACID) 500 MG tablet Take 1 tablet (500 mg) by mouth daily 90 tablet 1    Vitamin D, Cholecalciferol, 25 MCG (1000 UT) CAPS Take 1,000 mcg by mouth daily 90 capsule 3      No current facility-administered medications for this visit.            Social Habits:      Tobacco Use         Smoking status: Never      Smokeless tobacco: Never        Alcohol Use: Not on file           History   Drug Use Not on file         Family History:    Family History   No family history on file.        Physical Examination:  /55  (BP Location: Left arm, Patient Position: Chair, Cuff Size: Adult Regular)   Pulse 69   SpO2 97%       Wt Readings from Last 1 Encounters:   06/20/24 66.2 kg (145 lb 14.4 oz)      There is no height or weight on file to calculate BMI.     Exam:  General: No apparent distress. Answers questions appropriately   Neurologic: Focally intact, Alert and oriented x 3.   Eyes: Grossly normal.   Cardiac: Regular rate and rhythm, palpable femorals bilaterally, palpable DP and PT pulses bilaterally  Pulmonary:  Non labored breathing with normal respiratory effort  Abdominal: Soft, non distended, non tender to palpation.  No pulsatile mass.   Musculoskeletal/Extremity: 5/5 gross lower extremity strength.  edema.  No open wounds or abrasions.  Bilateral groin without rash or open wounds     Laboratory Findings:        Hemoglobin   Date Value Ref Range Status   06/20/2024 11.7 (L) 13.3 - 17.7 g/dL Final   04/25/2024 10.3 (L) 13.3 - 17.7 g/dL Final            WBC Count   Date Value Ref Range Status   06/20/2024 7.1 4.0 - 11.0 10e3/uL Final   04/25/2024 5.2 4.0 - 11.0 10e3/uL Final            Platelet Count   Date Value Ref Range Status   06/20/2024 323 150 - 450 10e3/uL Final   04/25/2024 510 (H) 150 - 450 10e3/uL Final            Creatinine   Date Value Ref Range Status   04/25/2024 0.92 0.67 - 1.17 mg/dL Final              Sodium   Date Value Ref Range Status   04/25/2024 140 135 - 145 mmol/L Final       Comment:       Reference intervals for this test were updated on 09/26/2023 to more accurately reflect our healthy population. There may be differences in the flagging of prior results with similar values performed with this method. Interpretation of those prior results can be made in the context of the updated reference intervals.             Glucose   Date Value Ref Range Status   04/25/2024 95 70 - 99 mg/dL Final   04/24/2024 96 70 - 99 mg/dL Final            INR   Date Value Ref Range Status   04/08/2024 1.24 (H) 0.85 -  1.15 Final            Imaging:     I personally reviewed the PET/CT from June 13 which shows no increased uptake in the aneurysm area.     Impression/Shared Medical Decision Making:     #1-4.6 cm anterior abdominal aorta/celiac artery aneurysm, asymptomatic  Mr. Larios is a pleasant 73 year old male seen for intra-abdominal aorta/celiac artery aneurysm.     Today, the patient appears to be much healthier and stronger which the patient agrees.  The daughter also thinks that he has been doing much better compared to our initial evaluation.     Given overall positive progress of his overall status, we think is reasonable to proceed with the surgery to fix the aneurysm.     Given his overall functional status that is still not completely adequate for open procedure, our first recommendation is to proceed with endovascular repair of this aneurysm using physician modified stent graft.     We explained that we will treat 2 fenestrations to stent celiac and SMA arteries and exclude celiac artery aneurysm.        We discussed the risks of the surgery including cardiac, pulmonary, renal failure, death, spinal cord ischemia (1 to 3% chance), access site complications, bowel complications, and etc.     After answering all the questions to satisfaction, the patient and family voiced understanding and agreed to proceed with the surgery.     Will plan for the surgery once we complete our preoperative workup including pre-operative anesthesia visit and necessary workup.    Recommendations/Plan:  Risk stratification/PAC visit  Scheduled for endovascular repair of aortic/celiac artery aneurysm using physician modified endograft with 2 fenestrations     Sofie Xavier MD  Vascular & Endovascular Surgery

## 2024-08-07 ENCOUNTER — APPOINTMENT (OUTPATIENT)
Dept: PHYSICAL THERAPY | Facility: CLINIC | Age: 74
DRG: 269 | End: 2024-08-07
Attending: STUDENT IN AN ORGANIZED HEALTH CARE EDUCATION/TRAINING PROGRAM
Payer: COMMERCIAL

## 2024-08-07 LAB
ALBUMIN SERPL BCG-MCNC: 3.5 G/DL (ref 3.5–5.2)
ALBUMIN SERPL BCG-MCNC: 3.6 G/DL (ref 3.5–5.2)
ALLEN'S TEST: ABNORMAL
ALP SERPL-CCNC: 57 U/L (ref 40–150)
ALP SERPL-CCNC: 58 U/L (ref 40–150)
ALP SERPL-CCNC: 58 U/L (ref 40–150)
ALP SERPL-CCNC: 59 U/L (ref 40–150)
ALT SERPL W P-5'-P-CCNC: 10 U/L (ref 0–70)
ALT SERPL W P-5'-P-CCNC: 8 U/L (ref 0–70)
ALT SERPL W P-5'-P-CCNC: 8 U/L (ref 0–70)
ALT SERPL W P-5'-P-CCNC: 9 U/L (ref 0–70)
ANION GAP SERPL CALCULATED.3IONS-SCNC: 10 MMOL/L (ref 7–15)
ANION GAP SERPL CALCULATED.3IONS-SCNC: 11 MMOL/L (ref 7–15)
ANION GAP SERPL CALCULATED.3IONS-SCNC: 12 MMOL/L (ref 7–15)
ANION GAP SERPL CALCULATED.3IONS-SCNC: 8 MMOL/L (ref 7–15)
APTT PPP: 35 SECONDS (ref 22–38)
APTT PPP: 36 SECONDS (ref 22–38)
APTT PPP: 37 SECONDS (ref 22–38)
APTT PPP: 39 SECONDS (ref 22–38)
AST SERPL W P-5'-P-CCNC: 31 U/L (ref 0–45)
AST SERPL W P-5'-P-CCNC: 34 U/L (ref 0–45)
AST SERPL W P-5'-P-CCNC: 35 U/L (ref 0–45)
AST SERPL W P-5'-P-CCNC: 37 U/L (ref 0–45)
BASE EXCESS BLDA CALC-SCNC: 0.9 MMOL/L (ref -3–3)
BASE EXCESS BLDA CALC-SCNC: 1.5 MMOL/L (ref -3–3)
BASE EXCESS BLDA CALC-SCNC: 2.6 MMOL/L (ref -3–3)
BASE EXCESS BLDA CALC-SCNC: 4.2 MMOL/L (ref -3–3)
BILIRUB SERPL-MCNC: 0.4 MG/DL
BILIRUB SERPL-MCNC: 0.5 MG/DL
BILIRUB SERPL-MCNC: 0.6 MG/DL
BILIRUB SERPL-MCNC: 0.6 MG/DL
BUN SERPL-MCNC: 13 MG/DL (ref 8–23)
BUN SERPL-MCNC: 15 MG/DL (ref 8–23)
BUN SERPL-MCNC: 15.6 MG/DL (ref 8–23)
BUN SERPL-MCNC: 17.6 MG/DL (ref 8–23)
CALCIUM SERPL-MCNC: 8.5 MG/DL (ref 8.8–10.4)
CALCIUM SERPL-MCNC: 8.6 MG/DL (ref 8.8–10.4)
CALCIUM SERPL-MCNC: 8.6 MG/DL (ref 8.8–10.4)
CALCIUM SERPL-MCNC: 8.7 MG/DL (ref 8.8–10.4)
CHLORIDE SERPL-SCNC: 106 MMOL/L (ref 98–107)
CHLORIDE SERPL-SCNC: 106 MMOL/L (ref 98–107)
CHLORIDE SERPL-SCNC: 107 MMOL/L (ref 98–107)
CHLORIDE SERPL-SCNC: 107 MMOL/L (ref 98–107)
COHGB MFR BLD: 95.3 % (ref 95–96)
COHGB MFR BLD: 96.3 % (ref 95–96)
COHGB MFR BLD: 97.6 % (ref 95–96)
COHGB MFR BLD: 98.3 % (ref 95–96)
CREAT SERPL-MCNC: 0.78 MG/DL (ref 0.67–1.17)
CREAT SERPL-MCNC: 0.84 MG/DL (ref 0.67–1.17)
CREAT SERPL-MCNC: 0.93 MG/DL (ref 0.67–1.17)
CREAT SERPL-MCNC: 1.01 MG/DL (ref 0.67–1.17)
EGFRCR SERPLBLD CKD-EPI 2021: 78 ML/MIN/1.73M2
EGFRCR SERPLBLD CKD-EPI 2021: 86 ML/MIN/1.73M2
EGFRCR SERPLBLD CKD-EPI 2021: >90 ML/MIN/1.73M2
EGFRCR SERPLBLD CKD-EPI 2021: >90 ML/MIN/1.73M2
ERYTHROCYTE [DISTWIDTH] IN BLOOD BY AUTOMATED COUNT: 14.6 % (ref 10–15)
ERYTHROCYTE [DISTWIDTH] IN BLOOD BY AUTOMATED COUNT: 14.7 % (ref 10–15)
ERYTHROCYTE [DISTWIDTH] IN BLOOD BY AUTOMATED COUNT: 14.7 % (ref 10–15)
ERYTHROCYTE [DISTWIDTH] IN BLOOD BY AUTOMATED COUNT: 14.8 % (ref 10–15)
FIBRINOGEN PPP-MCNC: 403 MG/DL (ref 170–510)
FIBRINOGEN PPP-MCNC: 420 MG/DL (ref 170–510)
FIBRINOGEN PPP-MCNC: 425 MG/DL (ref 170–510)
FIBRINOGEN PPP-MCNC: 428 MG/DL (ref 170–510)
GLUCOSE BLDC GLUCOMTR-MCNC: 80 MG/DL (ref 70–99)
GLUCOSE BLDC GLUCOMTR-MCNC: 96 MG/DL (ref 70–99)
GLUCOSE BLDC GLUCOMTR-MCNC: 98 MG/DL (ref 70–99)
GLUCOSE SERPL-MCNC: 110 MG/DL (ref 70–99)
GLUCOSE SERPL-MCNC: 89 MG/DL (ref 70–99)
GLUCOSE SERPL-MCNC: 91 MG/DL (ref 70–99)
GLUCOSE SERPL-MCNC: 93 MG/DL (ref 70–99)
HCO3 BLD-SCNC: 25 MMOL/L (ref 21–28)
HCO3 BLD-SCNC: 26 MMOL/L (ref 21–28)
HCO3 BLD-SCNC: 27 MMOL/L (ref 21–28)
HCO3 BLD-SCNC: 29 MMOL/L (ref 21–28)
HCO3 SERPL-SCNC: 22 MMOL/L (ref 22–29)
HCO3 SERPL-SCNC: 24 MMOL/L (ref 22–29)
HCO3 SERPL-SCNC: 25 MMOL/L (ref 22–29)
HCO3 SERPL-SCNC: 26 MMOL/L (ref 22–29)
HCT VFR BLD AUTO: 31 % (ref 40–53)
HCT VFR BLD AUTO: 31.4 % (ref 40–53)
HCT VFR BLD AUTO: 32.8 % (ref 40–53)
HCT VFR BLD AUTO: 33 % (ref 40–53)
HGB BLD-MCNC: 10.2 G/DL (ref 13.3–17.7)
HGB BLD-MCNC: 10.2 G/DL (ref 13.3–17.7)
HGB BLD-MCNC: 10.6 G/DL (ref 13.3–17.7)
HGB BLD-MCNC: 10.7 G/DL (ref 13.3–17.7)
INR PPP: 1.15 (ref 0.85–1.15)
INR PPP: 1.16 (ref 0.85–1.15)
INR PPP: 1.16 (ref 0.85–1.15)
INR PPP: 1.18 (ref 0.85–1.15)
LACTATE SERPL-SCNC: 0.5 MMOL/L (ref 0.7–2)
LACTATE SERPL-SCNC: 0.5 MMOL/L (ref 0.7–2)
LACTATE SERPL-SCNC: 0.6 MMOL/L (ref 0.7–2)
LACTATE SERPL-SCNC: 0.8 MMOL/L (ref 0.7–2)
MCH RBC QN AUTO: 31.3 PG (ref 26.5–33)
MCH RBC QN AUTO: 31.3 PG (ref 26.5–33)
MCH RBC QN AUTO: 31.4 PG (ref 26.5–33)
MCH RBC QN AUTO: 31.7 PG (ref 26.5–33)
MCHC RBC AUTO-ENTMCNC: 32.3 G/DL (ref 31.5–36.5)
MCHC RBC AUTO-ENTMCNC: 32.4 G/DL (ref 31.5–36.5)
MCHC RBC AUTO-ENTMCNC: 32.5 G/DL (ref 31.5–36.5)
MCHC RBC AUTO-ENTMCNC: 32.9 G/DL (ref 31.5–36.5)
MCV RBC AUTO: 96 FL (ref 78–100)
MCV RBC AUTO: 96 FL (ref 78–100)
MCV RBC AUTO: 97 FL (ref 78–100)
MCV RBC AUTO: 97 FL (ref 78–100)
O2/TOTAL GAS SETTING VFR VENT: 21 %
PCO2 BLD: 38 MM HG (ref 35–45)
PCO2 BLD: 39 MM HG (ref 35–45)
PCO2 BLD: 41 MM HG (ref 35–45)
PCO2 BLD: 44 MM HG (ref 35–45)
PH BLD: 7.43 [PH] (ref 7.35–7.45)
PLATELET # BLD AUTO: 190 10E3/UL (ref 150–450)
PLATELET # BLD AUTO: 191 10E3/UL (ref 150–450)
PLATELET # BLD AUTO: 219 10E3/UL (ref 150–450)
PLATELET # BLD AUTO: 219 10E3/UL (ref 150–450)
PO2 BLD: 69 MM HG (ref 80–105)
PO2 BLD: 74 MM HG (ref 80–105)
PO2 BLD: 85 MM HG (ref 80–105)
PO2 BLD: 94 MM HG (ref 80–105)
POTASSIUM SERPL-SCNC: 3.7 MMOL/L (ref 3.4–5.3)
POTASSIUM SERPL-SCNC: 4 MMOL/L (ref 3.4–5.3)
POTASSIUM SERPL-SCNC: 4.1 MMOL/L (ref 3.4–5.3)
POTASSIUM SERPL-SCNC: 4.4 MMOL/L (ref 3.4–5.3)
PROT SERPL-MCNC: 6.3 G/DL (ref 6.4–8.3)
PROT SERPL-MCNC: 6.4 G/DL (ref 6.4–8.3)
PROT SERPL-MCNC: 6.4 G/DL (ref 6.4–8.3)
PROT SERPL-MCNC: 6.5 G/DL (ref 6.4–8.3)
RBC # BLD AUTO: 3.22 10E6/UL (ref 4.4–5.9)
RBC # BLD AUTO: 3.26 10E6/UL (ref 4.4–5.9)
RBC # BLD AUTO: 3.39 10E6/UL (ref 4.4–5.9)
RBC # BLD AUTO: 3.41 10E6/UL (ref 4.4–5.9)
SAO2 % BLDA: 93 % (ref 92–100)
SAO2 % BLDA: 94 % (ref 92–100)
SAO2 % BLDA: 96 % (ref 92–100)
SAO2 % BLDA: 96 % (ref 92–100)
SODIUM SERPL-SCNC: 141 MMOL/L (ref 135–145)
WBC # BLD AUTO: 6.5 10E3/UL (ref 4–11)
WBC # BLD AUTO: 7.1 10E3/UL (ref 4–11)
WBC # BLD AUTO: 8 10E3/UL (ref 4–11)
WBC # BLD AUTO: 8.8 10E3/UL (ref 4–11)

## 2024-08-07 PROCEDURE — 250N000013 HC RX MED GY IP 250 OP 250 PS 637: Performed by: STUDENT IN AN ORGANIZED HEALTH CARE EDUCATION/TRAINING PROGRAM

## 2024-08-07 PROCEDURE — 250N000013 HC RX MED GY IP 250 OP 250 PS 637

## 2024-08-07 PROCEDURE — 200N000002 HC R&B ICU UMMC

## 2024-08-07 PROCEDURE — 85027 COMPLETE CBC AUTOMATED: CPT | Performed by: STUDENT IN AN ORGANIZED HEALTH CARE EDUCATION/TRAINING PROGRAM

## 2024-08-07 PROCEDURE — 97112 NEUROMUSCULAR REEDUCATION: CPT | Mod: GP

## 2024-08-07 PROCEDURE — 85610 PROTHROMBIN TIME: CPT | Performed by: STUDENT IN AN ORGANIZED HEALTH CARE EDUCATION/TRAINING PROGRAM

## 2024-08-07 PROCEDURE — 82805 BLOOD GASES W/O2 SATURATION: CPT | Performed by: STUDENT IN AN ORGANIZED HEALTH CARE EDUCATION/TRAINING PROGRAM

## 2024-08-07 PROCEDURE — 258N000003 HC RX IP 258 OP 636: Performed by: STUDENT IN AN ORGANIZED HEALTH CARE EDUCATION/TRAINING PROGRAM

## 2024-08-07 PROCEDURE — 85730 THROMBOPLASTIN TIME PARTIAL: CPT | Performed by: STUDENT IN AN ORGANIZED HEALTH CARE EDUCATION/TRAINING PROGRAM

## 2024-08-07 PROCEDURE — 99024 POSTOP FOLLOW-UP VISIT: CPT | Performed by: NURSE PRACTITIONER

## 2024-08-07 PROCEDURE — 999N000111 HC STATISTIC OT IP EVAL DEFER

## 2024-08-07 PROCEDURE — 85384 FIBRINOGEN ACTIVITY: CPT | Performed by: STUDENT IN AN ORGANIZED HEALTH CARE EDUCATION/TRAINING PROGRAM

## 2024-08-07 PROCEDURE — 84450 TRANSFERASE (AST) (SGOT): CPT | Performed by: STUDENT IN AN ORGANIZED HEALTH CARE EDUCATION/TRAINING PROGRAM

## 2024-08-07 PROCEDURE — 97116 GAIT TRAINING THERAPY: CPT | Mod: GP

## 2024-08-07 PROCEDURE — 258N000003 HC RX IP 258 OP 636

## 2024-08-07 PROCEDURE — 84155 ASSAY OF PROTEIN SERUM: CPT | Performed by: STUDENT IN AN ORGANIZED HEALTH CARE EDUCATION/TRAINING PROGRAM

## 2024-08-07 PROCEDURE — 97530 THERAPEUTIC ACTIVITIES: CPT | Mod: GP

## 2024-08-07 PROCEDURE — 250N000011 HC RX IP 250 OP 636: Performed by: STUDENT IN AN ORGANIZED HEALTH CARE EDUCATION/TRAINING PROGRAM

## 2024-08-07 PROCEDURE — 97162 PT EVAL MOD COMPLEX 30 MIN: CPT | Mod: GP

## 2024-08-07 PROCEDURE — 83605 ASSAY OF LACTIC ACID: CPT | Performed by: STUDENT IN AN ORGANIZED HEALTH CARE EDUCATION/TRAINING PROGRAM

## 2024-08-07 PROCEDURE — 99232 SBSQ HOSP IP/OBS MODERATE 35: CPT | Mod: 24 | Performed by: SURGERY

## 2024-08-07 RX ORDER — CALCIUM CARBONATE 500 MG/1
500 TABLET, CHEWABLE ORAL 2 TIMES DAILY PRN
Status: DISCONTINUED | OUTPATIENT
Start: 2024-08-07 | End: 2024-08-09 | Stop reason: HOSPADM

## 2024-08-07 RX ORDER — SODIUM CHLORIDE, SODIUM LACTATE, POTASSIUM CHLORIDE, CALCIUM CHLORIDE 600; 310; 30; 20 MG/100ML; MG/100ML; MG/100ML; MG/100ML
INJECTION, SOLUTION INTRAVENOUS CONTINUOUS
Status: DISCONTINUED | OUTPATIENT
Start: 2024-08-07 | End: 2024-08-08

## 2024-08-07 RX ORDER — POTASSIUM CHLORIDE 750 MG/1
20 TABLET, EXTENDED RELEASE ORAL ONCE
Status: COMPLETED | OUTPATIENT
Start: 2024-08-07 | End: 2024-08-07

## 2024-08-07 RX ADMIN — Medication 5 MG: at 23:52

## 2024-08-07 RX ADMIN — SODIUM CHLORIDE, POTASSIUM CHLORIDE, SODIUM LACTATE AND CALCIUM CHLORIDE: 600; 310; 30; 20 INJECTION, SOLUTION INTRAVENOUS at 05:02

## 2024-08-07 RX ADMIN — SENNOSIDES AND DOCUSATE SODIUM 1 TABLET: 50; 8.6 TABLET ORAL at 19:48

## 2024-08-07 RX ADMIN — OXYCODONE HYDROCHLORIDE 2.5 MG: 5 TABLET ORAL at 00:19

## 2024-08-07 RX ADMIN — ACETAMINOPHEN 975 MG: 325 TABLET, FILM COATED ORAL at 10:00

## 2024-08-07 RX ADMIN — ACETAMINOPHEN 975 MG: 325 TABLET, FILM COATED ORAL at 02:20

## 2024-08-07 RX ADMIN — CEFAZOLIN 1 G: 1 INJECTION, POWDER, FOR SOLUTION INTRAMUSCULAR; INTRAVENOUS at 02:18

## 2024-08-07 RX ADMIN — OXYCODONE HYDROCHLORIDE 2.5 MG: 5 TABLET ORAL at 05:05

## 2024-08-07 RX ADMIN — LEVETIRACETAM 1000 MG: 500 TABLET, FILM COATED ORAL at 19:48

## 2024-08-07 RX ADMIN — OXYCODONE HYDROCHLORIDE 2.5 MG: 5 TABLET ORAL at 20:46

## 2024-08-07 RX ADMIN — ACETAMINOPHEN 975 MG: 325 TABLET, FILM COATED ORAL at 17:56

## 2024-08-07 RX ADMIN — SODIUM CHLORIDE, POTASSIUM CHLORIDE, SODIUM LACTATE AND CALCIUM CHLORIDE 500 ML: 600; 310; 30; 20 INJECTION, SOLUTION INTRAVENOUS at 14:55

## 2024-08-07 RX ADMIN — POLYETHYLENE GLYCOL 3350 17 G: 17 POWDER, FOR SOLUTION ORAL at 07:47

## 2024-08-07 RX ADMIN — SODIUM CHLORIDE, POTASSIUM CHLORIDE, SODIUM LACTATE AND CALCIUM CHLORIDE 500 ML: 600; 310; 30; 20 INJECTION, SOLUTION INTRAVENOUS at 18:39

## 2024-08-07 RX ADMIN — SODIUM CHLORIDE, POTASSIUM CHLORIDE, SODIUM LACTATE AND CALCIUM CHLORIDE: 600; 310; 30; 20 INJECTION, SOLUTION INTRAVENOUS at 19:51

## 2024-08-07 RX ADMIN — LIDOCAINE: 40 CREAM TOPICAL at 05:03

## 2024-08-07 RX ADMIN — ASPIRIN 81 MG CHEWABLE TABLET 81 MG: 81 TABLET CHEWABLE at 19:48

## 2024-08-07 RX ADMIN — ANTACID TABLETS 500 MG: 500 TABLET, CHEWABLE ORAL at 16:25

## 2024-08-07 RX ADMIN — ALUMINUM HYDROXIDE, MAGNESIUM HYDROXIDE, AND SIMETHICONE 30 ML: 1200; 120; 1200 SUSPENSION ORAL at 18:01

## 2024-08-07 RX ADMIN — LEVETIRACETAM 750 MG: 750 TABLET, FILM COATED ORAL at 07:47

## 2024-08-07 RX ADMIN — POTASSIUM CHLORIDE 20 MEQ: 750 TABLET, EXTENDED RELEASE ORAL at 15:01

## 2024-08-07 RX ADMIN — SENNOSIDES AND DOCUSATE SODIUM 1 TABLET: 50; 8.6 TABLET ORAL at 07:47

## 2024-08-07 RX ADMIN — OXYCODONE HYDROCHLORIDE 2.5 MG: 5 TABLET ORAL at 12:22

## 2024-08-07 RX ADMIN — OXYCODONE HYDROCHLORIDE 2.5 MG: 5 TABLET ORAL at 16:17

## 2024-08-07 ASSESSMENT — ACTIVITIES OF DAILY LIVING (ADL)
ADLS_ACUITY_SCORE: 28
ADLS_ACUITY_SCORE: 27
ADLS_ACUITY_SCORE: 28
ADLS_ACUITY_SCORE: 27
ADLS_ACUITY_SCORE: 27
ADLS_ACUITY_SCORE: 28
ADLS_ACUITY_SCORE: 28
ADLS_ACUITY_SCORE: 27
ADLS_ACUITY_SCORE: 28
ADLS_ACUITY_SCORE: 27
ADLS_ACUITY_SCORE: 28
ADLS_ACUITY_SCORE: 28
ADLS_ACUITY_SCORE: 27
ADLS_ACUITY_SCORE: 28
ADLS_ACUITY_SCORE: 28
ADLS_ACUITY_SCORE: 27
ADLS_ACUITY_SCORE: 27

## 2024-08-07 ASSESSMENT — VISUAL ACUITY
OU: NORMAL ACUITY

## 2024-08-07 NOTE — PROGRESS NOTES
Vascular Surgery Progress Note  08/07/2024       Subjective:  Feeling comfortable, denies pain, no acute events overnight.     Objective:  Temp:  [97  F (36.1  C)-98.8  F (37.1  C)] 98.4  F (36.9  C)  Pulse:  [71-90] 73  Resp:  [16-18] 18  MAP:  [64 mmHg-308 mmHg] 64 mmHg  Arterial Line BP: (100-168)/(45-84) 118/45  SpO2:  [90 %-100 %] 90 %    I/O last 3 completed shifts:  In: 4263.31 [P.O.:300; I.V.:3963.31]  Out: 3075 [Urine:2975; Blood:100]      Gen: Awake, alert, NAD  CV: RRR per telemetry  Resp: NLB on room air  Abd: soft, nondistended, nontender  Incision: Bilateral groins c/d/I  Ext: WWP, no edema  Vascular: Palpable femoral pulses bilaterally. Palpable DP pulses bilaterally, Doppler PT pulses bilaterally. Motor and sensory function intact.     Labs:  Recent Labs   Lab 08/07/24  0514 08/06/24  2323 08/06/24  1643   WBC 8.8 8.0 6.6   HGB 10.6* 10.7* 11.3*    219 217       Recent Labs   Lab 08/07/24  0515 08/07/24  0514 08/07/24  0225 08/06/24  2323 08/06/24  1645 08/06/24  1643   NA  --  141  --  141  --  136   POTASSIUM  --  4.0  --  4.4  --  4.5   CHLORIDE  --  106  --  107  --  104   CO2  --  24  --  22  --  22   BUN  --  15.6  --  17.6  --  20.7   CR  --  0.93  --  1.01  --  1.12   GLC 98 89 96 110*   < > 128*   MALIHA  --  8.7*  --  8.5*  --  9.0    < > = values in this interval not displayed.     Assessment/Plan:   73 year old male s/p 4 vessel PMEG for celiac artery aneurysm. Recovering well in the ICU, neurologically intact.  - ADAT  - MAP goal 70mmHg  - Multimodal pain control  - Remove rodriguez catheter  - PT/OT, OOB  - ASA 81 mg daily  - Heparin subcutaneous for DVT prophylaxis  - Sequential compression devices (SCDs) on bilateral lower extremities while patient is in bed or in chair  - Monitor groins for new drainage, hematoma, swelling  - Continue with q1h neuro and pulse checks, patient must be able to lift/elevate extremities off bed    Patient was seen and examined with Dr. Calli Bethea who is  in agreement with above    To reach [facility]'s vascular surgery team on call, please go to [facility], and then find the below in the drop down menu. Please page first call.        Yvette Alvarez CNP  Vascular Surgery  Pager: [phone]  [email or web address]  Send message or 10 digit call back number Securely via [employer] with the [employer] Web Console (learn more here)

## 2024-08-07 NOTE — PLAN OF CARE
-Neuro: alert and oriented x4. PERRLA 4 mm. Follows commands and moves all extremities with equal strength (RUE movement limited d/t baseline shoulder issue). Denies numbness and tingling, sensation intact. Per MD, assessing bilateral hip flexion with each neuro check.  -Cardiac: HR NSR 70-80s.  SBP goal <160, MAP >75 achieved without intervention. PRN labetolol available. Afebrile. +2 pulses. Doppler posterior tibials and pedals.  -Resp: Lungs clear/dim on RA. Coughing up small amt of thick frothy/pink sputum intermittently  -GI: NPO except meds, denies nausea  -: Sneed with good urine output. Clear, yellow. Intermittent leaking around catheter.  -Pain: scheduled tylenol given  -Skin: bilateral groin sites open to air, no hematoma or bruising.blachable redness to buttocks, protective mepilex in place  -Activity: Flat bedrest until 2130, HOB <30 and bedrest after 2130.  -Access: R DL internal jugular CVC with capped introducer, L radial arterial line, PIV x2  -Gtts: LR at 125 ml/hr  -Labs: Hgb 11.3, Lactic 0.7  -Social: daughter at bedside briefly and supportive of patient, RN updated daughter and number to 4E given    Plan: continue neuro checks, q1 hr at 2000, monitor groin sites    Yasmin Madrigal RN  8/6/2024  7:35 PM

## 2024-08-07 NOTE — PROGRESS NOTES
"   08/07/24 1054   Appointment Info   Signing Clinician's Name / Credentials (PT) Latoya Gutierrez, PT, DPT   Rehab Comments (PT) SBP<160, MAP<70       Present no   Living Environment   People in Home facility resident   Current Living Arrangements assisted living   Home Accessibility wheelchair accessible   Transportation Anticipated family or friend will provide   Living Environment Comments Pt reports he lives in a senior living facility on the 4th floor. W/c accessible with elevator. Staff present and can provide assist; family nearby to assist. Walk-in shower with grabbars and shower chair.   Self-Care   Usual Activity Tolerance good   Current Activity Tolerance moderate   Regular Exercise Yes   Activity/Exercise Type other (see comments)  ([facility])   Exercise Amount/Frequency 2 times/wk   Equipment Currently Used at Home cane, straight;walker, rolling;grab bar, toilet;grab bar, tub/shower;raised toilet seat;shower chair  (adjustable shower head)   Fall history within last six months yes   Number of times patient has fallen within last six months 1   Activity/Exercise/Self-Care Comment Reports IND/Kayleen with ADL's and mobility using SPC. Has an adjustable shower head, grabbars in bathroom, and toilet with increased height. 1 fall in last 6 months. Facility meal preps. Family sets up medication for the week. D/c from [facility] on 8/5.   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 The patient has a past medical history of Crohn's disease previously taking immodium, seizure disorder on Keppra, chronic fibrotic interstitial lung disease, " "chronic macrocytic anemia (most recent preop hgb 13.2), cognitive impairment, subclinical CAD, mild bilateral renal artery stenosis, and resolved C.diff colitis. The patient underwent a PMEG endovascular repair of aortic and celiac pseudoaneurysm with 4 vessel physician modified fenestrated-branched endograft with fenestrations for the celiac, superior mesenteric, and bilateral renal arteries on 08/06/24.  He is POD1\"   Existing Precautions/Restrictions fall   Weight-Bearing Status - LUE full weight-bearing   Weight-Bearing Status - RUE full weight-bearing   Weight-Bearing Status - LLE full weight-bearing   Weight-Bearing Status - RLE full weight-bearing   General Observations Activity: Adult ICU Early Mobility Protocol   Cognition   Orientation Status (Cognition) oriented x 4   Pain Assessment   Patient Currently in Pain No   Integumentary/Edema   Integumentary/Edema no deficits were identifed   Posture    Posture Forward head position;Protracted shoulders   Range of Motion (ROM)   Range of Motion ROM is WFL   Strength (Manual Muscle Testing)   Strength (Manual Muscle Testing) strength is WFL   Bed Mobility   Comment, (Bed Mobility) not assessed; will complete at later time   Transfers   Comment, (Transfers) sit > stand with SBA and SPC   Gait/Stairs (Locomotion)   Distance in Feet (Gait) 10ft   Comment, (Gait/Stairs) Ambulated 10ft with SPC and CGA   Balance   Balance other (describe)   Balance Comments fair(+) sitting balance; fair standing balance   Sensory Examination   Sensory Perception patient reports no sensory changes   Coordination   Coordination no deficits were identified   Muscle Tone   Muscle Tone no deficits were identified   Clinical Impression   Criteria for Skilled Therapeutic Intervention Yes, treatment indicated   PT Diagnosis (PT) impaired functional mobility   Influenced by the following impairments decreased balance   Functional limitations due to impairments difficulty with functional " mobility   Clinical Presentation (PT Evaluation Complexity) stable   Clinical Presentation Rationale per clinical judgment   Clinical Decision Making (Complexity) moderate complexity   Planned Therapy Interventions (PT) balance training;bed mobility training;gait training;home exercise program;motor coordination training;neuromuscular re-education;patient/family education;postural re-education;ROM (range of motion);strengthening;stretching;transfer training;progressive activity/exercise;risk factor education;home program guidelines   Risk & Benefits of therapy have been explained evaluation/treatment results reviewed;care plan/treatment goals reviewed;risks/benefits reviewed;current/potential barriers reviewed;participants voiced agreement with care plan;participants included;patient   PT Total Evaluation Time   PT Eval, Moderate Complexity Minutes (04261) 7   Physical Therapy Goals   PT Frequency 6x/week   PT Predicted Duration/Target Date for Goal Attainment 08/21/24   PT: Bed Mobility Independent;Supine to/from sit;Rolling;Bridging  (with HOB flat)   PT: Transfers Modified independent;Sit to/from stand;Bed to/from chair  (with SPC)   PT: Gait Modified independent;Greater than 200 feet  (with SPC)   PT: Goal 1 Pt will demonstrate a low risk for falls on any standardized balance assessment to reduce readmission risk from falls.   PT Discharge Planning   PT Plan bed mobility, safety with gait, balance   PT Discharge Recommendation (DC Rec) home with assist;home with home care physical therapy   PT Rationale for DC Rec Pt is slightly below baseline for mobility. Limited by decreased balance. Once medically appropriate, anticipated to return to senior facility with increased assist as needed. Will benefit from HHPT to reduce risk for falls and readmission while ensuring pt IND. Will update as appropriate.   PT Brief overview of current status Ax1 with gait belt and SPC; up to recliner and hallway walking 3-4x/day

## 2024-08-07 NOTE — PLAN OF CARE
Assumed care for patient [id number]. Pt remains aox4, strong in all 4, able to lift legs off bed without difficulty. Sites unchanged. Pulses strong in bilat lower extremities. On low dose jo to maintain MAP above 75. NSR. On RA. UOP appropriate. Complains of pain in bladder/urethra. PRNs given as indicated, see MAR.     Goal Outcome Evaluation:      Plan of Care Reviewed With: patient    Overall Patient Progress: improvingOverall Patient Progress: improving         Problem: Adult Inpatient Plan of Care  Goal: Plan of Care Review  Description: The Plan of Care Review/Shift note should be completed every shift.  The Outcome Evaluation is a brief statement about your assessment that the patient is improving, declining, or no change.  This information will be displayed automatically on your shift  note.  Outcome: Progressing  Flowsheets (Taken 8/7/2024 0618)  Plan of Care Reviewed With: patient  Overall Patient Progress: improving     Problem: Cardiac Catheterization (Diagnostic/Interventional)  Goal: Optimal Pain Control and Function  Intervention: Prevent or Manage Pain  Recent Flowsheet Documentation  Taken 8/7/2024 0505 by Betty Trammell RN  Pain Management Interventions: medication (see MAR)  Taken 8/7/2024 0220 by Betty Trammell, RN  Pain Management Interventions: medication (see MAR)     Problem: Cardiac Catheterization (Diagnostic/Interventional)  Goal: Absence of Vascular Access Complication  Intervention: Prevent and Manage Access Complications  Recent Flowsheet Documentation  Taken 8/7/2024 0400 by Betty Trammell, RN  Bleeding Precautions:   blood pressure closely monitored   monitored for signs of bleeding  Activity Management: bedrest  Infection Management: aseptic technique maintained  Head of Bed (HOB) Positioning: HOB at 20 degrees

## 2024-08-07 NOTE — PROGRESS NOTES
Seen overnight while on cross cover call for vascular surgery. On entry to room, patient A&O x3, resting comfortably in bed. Reports some pain with rodriguez catheter placement. Discussed with nursing and will continue lidocaine gel PRN for comfort throughout the night given primary team planned to keep rodriguez in place overnight. Patient amendable to keeping rodriguez in place.    Bilateral groin incisions clean, dry, intact with no overlying bandages. No swelling appreciated.  Bilateral hip flexion intact with no pain with movement.   Palpable bilateral PT pulses, right PT slightly weaker than left PT but still palpable.   Bilateral DP pulses palpable.   Extremities warm, with no erythema or swelling noted.    Jenny Stauffer MD  General Surgery, PGY-1

## 2024-08-07 NOTE — PROGRESS NOTES
SURGICAL ICU PROGRESS NOTE  08/07/2024      PRIMARY TEAM: Vascular Surgery  PRIMARY PHYSICIAN: Shine Sprague MD  REASON FOR CRITICAL CARE ADMISSION: Placement post-op   ADMITTING PHYSICIAN: Dr. 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 The patient has a past medical history of Crohn's disease previously taking immodium, seizure disorder on Keppra, chronic fibrotic interstitial lung disease, chronic macrocytic anemia (most recent preop hgb 13.2), cognitive impairment, subclinical CAD, mild bilateral renal artery stenosis, and resolved C.diff colitis. The patient underwent a PMEG endovascular repair of aortic and celiac pseudoaneurysm with 4 vessel physician modified fenestrated-branched endograft with fenestrations for the celiac, superior mesenteric, and bilateral renal arteries on 08/06/24.  He is POD1      Overnight Events:  - No overnight events. Required 0.2 of phenylephrine.     Changes Today:  - ADAT: Clear lqiuds  - Out of bed and working with PT  - Incentive spirometry  - MAP goal < 70mmHg  - Start Aspirin.  - Wean phenylephrine  - Discontinue rodriguez  -Discontinue Central Line  - Discontinue IVF with adequate oral intake      Plan: Critical Care   Neuro/ pain/ sedation:  # acute pain  # Mild Cognitive Impairment  # Idiopathic Seizure Disorder with Breakthrough Seizures  - Monitor neurological status. Notify provider for any acute changes in exam.  -Scheduled Tylenol  - PRN oxycodone and dilaudid.  -Neurological checks 2 hours              -Patient must be able to flex at hip  - Keppra 750mg morning, Keppra 1000mg in the evening.     Pulmonary:  # Chronic Fibrotic Interstitial Lung Disease  - supplemental oxygen to keep saturation about 92%  -On Room air  -ABG every 6 hours for 48 hours  -Incentive spirometry  -Pulmonary toilet     Cardiovascular:  # Subclinical CAD   #  Abdominal Aortic Aneurysm   # Celiac artery aneurysm  # s/p PMEG 08/06/24  - Monitor hemodynamic status.  -up with assist  -Access site checks every 2 hours  -Doppler/vascular checks every 2 hours  -Daily aspirin 81mg starting 8/7 AM  -Cardiac monitoring  - Wean pressors  -MAP goal 70 mmHg, <160mmHg     GI/Nutrition:  # Crohn's Disease   # Hx C. Diff Colitis  - Diet: NPO per Anesthesia Guidelines for Procedure/Surgery Except for: Meds    - Nutrition consulted. Appreciate recommendations.   - Plan for ADAT with clears on 8/7  -Senna-docusate and miralax scheduled  - LFTs post-op and in the AM (8/7)     Renal/ Fluid Balance:   # Bilateral Mild Renal Artery Stenosis  - Will monitor intake and output  - LR @ 125cc for IV fluid hydration. Discontinue when there is adequate oral intake.  - ICU electrolyte replacement protocol  - Lactate every 6 hours for 48 hours  - Sneed discontinued today     Endocrine:  # Chronic B12 Deficiency  #Stress induced Hyperglycemia  - Blood sugar levels below 180.     ID:  #Perioperative antimicrobial prophylaxis  - 2g Cefazolin every eight hours for 1 additional doses.     Hematology:  # Chronic Macrocytic Anemia  - CBC, fibrinogen, INR, PTT every 6 hours for 48 hours  - Hgb > 9, Plt >100, Fibrinogen <200, INR <1.5  -INR: 1.16, PTT: 35, Plts: 219, Hgb: 10.7     MSK:   - PT and OT consulted. Appreciate recommendations.  Activity: Up with assist     Lines/ tubes/ drains:  - MAC RIJ (to be removed)  -Left radial Arterial line  -PIV Left upper forearm and hand   Sneed Catheter: PRESENT, indication:    Lines: PRESENT            Prophylaxis:  - DVT Prophylaxis: Pneumatic Compression Devices  - PUD Prophylaxis: None     Code Status:  Full code     Disposition:  - ICU    Patient seen and discussed with staff.    Luis Felipe Blood MD  SICU Residents    Clinically Significant Risk Factors          # Hypocalcemia: Lowest Ca = 8.5 mg/dL in last 2 days, will monitor and replace as appropriate      #  Coagulation Defect: INR = 1.16 (Ref range: 0.85 - 1.15) and/or PTT = 35 Seconds (Ref range: 22 - 38 Seconds), will monitor for bleeding                   # Financial/Environmental Concerns:                ====================================      OBJECTIVE:     Temp:  [97  F (36.1  C)-98.8  F (37.1  C)] 98.4  F (36.9  C)  Pulse:  [71-90] 87  Resp:  [16-20] 18  BP: (116)/(100) 116/100  MAP:  [67 mmHg-308 mmHg] 93 mmHg  Arterial Line BP: (100-168)/(49-84) 156/65  SpO2:  [91 %-100 %] 100 %  Resp: 18      I/O last 3 completed shifts:  In: 4263.31 [P.O.:300; I.V.:3963.31]  Out: 3075 [Urine:2975; Blood:100]    General: Lying in bed, Drowsy, but follows commands.   HEENT: MAC line in RIJ(to be removed)  Neuro: Squeezes hands and flexes both hips to command.  CV: Regular rate and rhythm via telemetry  Pulm: Room air. Non-labored breathing.   Abd: soft, non-distended, non-tender.  : Sneed with clear, yellow urine  Extremities: Warm and well perfused  Skin: Warm and well perfused.  Incisions: Access sites are clean, dry and intact with dermabond over the incisions.  Vascular:   B/l groins soft, no hematoma, puncture sites c/d/I. Palpable bilateral DP and PT pulses, feet warm, normal cap refill    LABS:   Arterial Blood Gases   Recent Labs   Lab 08/07/24  0514 08/07/24  0227 08/06/24  1644   PH 7.43 7.43 7.39   PCO2 39 38 39   PO2 74* 94 123*   HCO3 26 25 24     Complete Blood Count   Recent Labs   Lab 08/07/24  0514 08/06/24  2323 08/06/24  1643 08/06/24  0735   WBC 8.8 8.0 6.6 6.2   HGB 10.6* 10.7* 11.3* 13.2*    219 217 306     Basic Metabolic Panel  Recent Labs   Lab 08/07/24  0515 08/07/24  0514 08/07/24  0225 08/06/24  2323 08/06/24  1645 08/06/24  1643 08/06/24  0735   NA  --  141  --  141  --  136 139   POTASSIUM  --  4.0  --  4.4  --  4.5 4.5   CHLORIDE  --  106  --  107  --  104 103   CO2  --  24  --  22  --  22 24   BUN  --  15.6  --  17.6  --  20.7 22.2   CR  --  0.93  --  1.01  --  1.12 1.30*   GLC 98 89  96 110*   < > 128* 85    < > = values in this interval not displayed.     Liver Function Tests  Recent Labs   Lab 08/07/24  0514 08/06/24  2323 08/06/24  1643   AST 34 31 21   ALT 8 8 8   ALKPHOS 57 59 65   BILITOTAL 0.5 0.4 0.5   ALBUMIN 3.6 3.5 3.9   INR 1.16* 1.15 1.14     Pancreatic Enzymes  No lab results found in last 7 days.  Coagulation Profile  Recent Labs   Lab 08/07/24  0514 08/06/24 2323 08/06/24  1643   INR 1.16* 1.15 1.14   PTT 35 36 40*         IMAGING:   No results found for this or any previous visit (from the past 24 hour(s)).

## 2024-08-07 NOTE — PROGRESS NOTES
This RN cared for patient from 1900 to approx 0145. Full handoff report given to Betty WATSON RN. All questions answered.    Major shift events: Pt c/o urethral pain d/t rodriguez cath continuously and causing spikes in SBP up to 170's. MD made aware, pain meds and topical lidocaine cream used with some positive results. Phenylephrine gtt re-ordered per vascular MD/Surgical MD to keep MAP's at goal > 75.     Please see flowsheets for detailed assessments, vital signs and med administration.

## 2024-08-07 NOTE — PLAN OF CARE
Major Shift Events:  Pt's central line and rodriguez discontinued this am. Pt off of bedrest. Able to ambulate hallway and to chair per pt's baseline with walker. Voiding in urinal with out difficulty. Pt had one fluid bolus of 500mL for slight hypotension, then a second bolus around 630pm for hypotension. Incision sites stable, distal pulses palpable. Pain under control.   Plan: Discontinue arterial line in am. Serial labs. Vascular/CMS checks q2hrs. Plan for floor status in am.  For vital signs and complete assessments, please see documentation flowsheets.    Goal Outcome Evaluation:      Plan of Care Reviewed With: patient  Overall Patient Progress: improvingOverall Patient Progress: improving

## 2024-08-07 NOTE — PLAN OF CARE
Occupational Therapy: Orders received. Chart reviewed and discussed with care team.? Occupational Therapy not indicated due to pt moving with SBA/CGA, is appropriate for one discipline at this time. No acute OT needs currently.? Defer discharge recommendations to PT.? Will complete orders. Reorder OT as needed.

## 2024-08-07 NOTE — PHARMACY-ADMISSION MEDICATION HISTORY
Pharmacist Admission Medication History    Admission medication history is complete. The information provided in this note is only as accurate as the sources available at the time of the update.    Information Source(s): Family member, Hospital records, and CareEverywhere/SureScripts via phone    Pertinent Information:   - Pt's daughter Rylee manages pt's medications and appears to be an accurate/reliable historian.  - Opted to interview daughter d/t pt's documented moderate dementia per chart review.  - Per interview, Rylee did not know the indication for aspirin daily use.  - Eliquis was left off of list per interview as it was discontinued by HCP. Unclear indication per interview.    Changes made to PTA medication list:  Deleted:   Levetiracetam 1000 mg tab duplicate  Levetiracetam 750 mg tab duplicate    Allergies reviewed with patient and updates made in EHR: unable to assess    Medication History Completed By: Anton Henriquez [facility] 8/7/2024 1:19 PM    PTA Med List   Medication Sig Last Dose    aspirin (ASA) 81 MG chewable tablet Take 1 tablet (81 mg) by mouth every evening Past Week at AM    calcium carbonate (TUMS) 500 MG chewable tablet Take 1 chew tab by mouth 2 times daily as needed for heartburn Past Month at PRN    cyanocobalamin (VITAMIN B-12) 500 MCG SUBL sublingual tablet Place 2 tablets (1,000 mcg) under the tongue daily 8/7/2024 at AM    ferrous sulfate (FEROSUL) 325 (65 Fe) MG tablet Take 1 tablet (325 mg) by mouth daily 8/7/2024 at AM    levETIRAcetam (KEPPRA) 1000 MG tablet Take 1 tablet (1,000 mg) by mouth every evening 8/5/2024 at PM    levETIRAcetam (KEPPRA) 750 MG tablet Take 1 tablet (750 mg) by mouth every morning 8/6/2024    loperamide (IMODIUM A-D) 2 MG tablet Take 1 tablet (2 mg) by mouth 4 times daily as needed for diarrhea Past Month at PRN    midodrine (PROAMATINE) 5 MG tablet Take 2 tablets (10 mg) by mouth 3 times daily 8/6/2024 at AM    multivitamin w/minerals (THERA-VIT-M) tablet  Take 1 tablet by mouth daily 8/6/2024 at AM    thiamine (B-1) 100 MG tablet Take 1 tablet (100 mg) by mouth daily 8/6/2024 at AM    vitamin C (ASCORBIC ACID) 500 MG tablet Take 1 tablet (500 mg) by mouth daily 8/6/2024 at AM    Vitamin D, Cholecalciferol, 25 MCG (1000 UT) CAPS Take 1,000 mcg by mouth daily 8/6/2024 at AM     Anton Henriquez Pharm.D., R.Ph., PGY2 Critical Care Resident Pharmacist

## 2024-08-07 NOTE — PROGRESS NOTES
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  Yasmin Madrigal, ENRICO  8/6/2024

## 2024-08-08 ENCOUNTER — APPOINTMENT (OUTPATIENT)
Dept: PHYSICAL THERAPY | Facility: CLINIC | Age: 74
DRG: 269 | End: 2024-08-08
Attending: SURGERY
Payer: COMMERCIAL

## 2024-08-08 LAB
ALBUMIN SERPL BCG-MCNC: 3.2 G/DL (ref 3.5–5.2)
ALBUMIN SERPL BCG-MCNC: 3.3 G/DL (ref 3.5–5.2)
ALLEN'S TEST: ABNORMAL
ALP SERPL-CCNC: 53 U/L (ref 40–150)
ALP SERPL-CCNC: 56 U/L (ref 40–150)
ALT SERPL W P-5'-P-CCNC: 7 U/L (ref 0–70)
ALT SERPL W P-5'-P-CCNC: 8 U/L (ref 0–70)
ANION GAP SERPL CALCULATED.3IONS-SCNC: 8 MMOL/L (ref 7–15)
ANION GAP SERPL CALCULATED.3IONS-SCNC: 9 MMOL/L (ref 7–15)
APTT PPP: 40 SECONDS (ref 22–38)
APTT PPP: 42 SECONDS (ref 22–38)
AST SERPL W P-5'-P-CCNC: 35 U/L (ref 0–45)
AST SERPL W P-5'-P-CCNC: 35 U/L (ref 0–45)
BASE EXCESS BLDA CALC-SCNC: 4.6 MMOL/L (ref -3–3)
BILIRUB SERPL-MCNC: 0.5 MG/DL
BILIRUB SERPL-MCNC: 0.6 MG/DL
BUN SERPL-MCNC: 11 MG/DL (ref 8–23)
BUN SERPL-MCNC: 11.9 MG/DL (ref 8–23)
CALCIUM SERPL-MCNC: 8.3 MG/DL (ref 8.8–10.4)
CALCIUM SERPL-MCNC: 8.5 MG/DL (ref 8.8–10.4)
CHLORIDE SERPL-SCNC: 105 MMOL/L (ref 98–107)
CHLORIDE SERPL-SCNC: 106 MMOL/L (ref 98–107)
COHGB MFR BLD: >100 % (ref 95–96)
CREAT SERPL-MCNC: 0.72 MG/DL (ref 0.67–1.17)
CREAT SERPL-MCNC: 0.75 MG/DL (ref 0.67–1.17)
EGFRCR SERPLBLD CKD-EPI 2021: >90 ML/MIN/1.73M2
EGFRCR SERPLBLD CKD-EPI 2021: >90 ML/MIN/1.73M2
ERYTHROCYTE [DISTWIDTH] IN BLOOD BY AUTOMATED COUNT: 14.8 % (ref 10–15)
ERYTHROCYTE [DISTWIDTH] IN BLOOD BY AUTOMATED COUNT: 14.8 % (ref 10–15)
FIBRINOGEN PPP-MCNC: 446 MG/DL (ref 170–510)
FIBRINOGEN PPP-MCNC: 452 MG/DL (ref 170–510)
GLUCOSE SERPL-MCNC: 105 MG/DL (ref 70–99)
GLUCOSE SERPL-MCNC: 87 MG/DL (ref 70–99)
HCO3 BLD-SCNC: 29 MMOL/L (ref 21–28)
HCO3 SERPL-SCNC: 26 MMOL/L (ref 22–29)
HCO3 SERPL-SCNC: 26 MMOL/L (ref 22–29)
HCT VFR BLD AUTO: 29 % (ref 40–53)
HCT VFR BLD AUTO: 29.2 % (ref 40–53)
HGB BLD-MCNC: 9.5 G/DL (ref 13.3–17.7)
HGB BLD-MCNC: 9.5 G/DL (ref 13.3–17.7)
INR PPP: 1.15 (ref 0.85–1.15)
INR PPP: 1.19 (ref 0.85–1.15)
LACTATE SERPL-SCNC: 0.6 MMOL/L (ref 0.7–2)
MAGNESIUM SERPL-MCNC: 1.7 MG/DL (ref 1.7–2.3)
MCH RBC QN AUTO: 31.3 PG (ref 26.5–33)
MCH RBC QN AUTO: 31.7 PG (ref 26.5–33)
MCHC RBC AUTO-ENTMCNC: 32.5 G/DL (ref 31.5–36.5)
MCHC RBC AUTO-ENTMCNC: 32.8 G/DL (ref 31.5–36.5)
MCV RBC AUTO: 96 FL (ref 78–100)
MCV RBC AUTO: 97 FL (ref 78–100)
O2/TOTAL GAS SETTING VFR VENT: 28 %
PCO2 BLD: 43 MM HG (ref 35–45)
PH BLD: 7.44 [PH] (ref 7.35–7.45)
PHOSPHATE SERPL-MCNC: 2.5 MG/DL (ref 2.5–4.5)
PLATELET # BLD AUTO: 176 10E3/UL (ref 150–450)
PLATELET # BLD AUTO: 182 10E3/UL (ref 150–450)
PO2 BLD: 141 MM HG (ref 80–105)
POTASSIUM SERPL-SCNC: 4.2 MMOL/L (ref 3.4–5.3)
POTASSIUM SERPL-SCNC: 4.3 MMOL/L (ref 3.4–5.3)
PROT SERPL-MCNC: 6 G/DL (ref 6.4–8.3)
PROT SERPL-MCNC: 6.1 G/DL (ref 6.4–8.3)
RBC # BLD AUTO: 3 10E6/UL (ref 4.4–5.9)
RBC # BLD AUTO: 3.04 10E6/UL (ref 4.4–5.9)
SAO2 % BLDA: 98 % (ref 92–100)
SODIUM SERPL-SCNC: 140 MMOL/L (ref 135–145)
SODIUM SERPL-SCNC: 140 MMOL/L (ref 135–145)
WBC # BLD AUTO: 6.5 10E3/UL (ref 4–11)
WBC # BLD AUTO: 7.8 10E3/UL (ref 4–11)

## 2024-08-08 PROCEDURE — 83605 ASSAY OF LACTIC ACID: CPT | Performed by: STUDENT IN AN ORGANIZED HEALTH CARE EDUCATION/TRAINING PROGRAM

## 2024-08-08 PROCEDURE — 84100 ASSAY OF PHOSPHORUS: CPT | Performed by: SURGERY

## 2024-08-08 PROCEDURE — 97110 THERAPEUTIC EXERCISES: CPT | Mod: GP

## 2024-08-08 PROCEDURE — 120N000003 HC R&B IMCU UMMC

## 2024-08-08 PROCEDURE — 85610 PROTHROMBIN TIME: CPT | Performed by: STUDENT IN AN ORGANIZED HEALTH CARE EDUCATION/TRAINING PROGRAM

## 2024-08-08 PROCEDURE — 83735 ASSAY OF MAGNESIUM: CPT | Performed by: SURGERY

## 2024-08-08 PROCEDURE — 999N000111 HC STATISTIC OT IP EVAL DEFER

## 2024-08-08 PROCEDURE — 99233 SBSQ HOSP IP/OBS HIGH 50: CPT | Performed by: PHYSICIAN ASSISTANT

## 2024-08-08 PROCEDURE — 250N000013 HC RX MED GY IP 250 OP 250 PS 637: Performed by: PHYSICIAN ASSISTANT

## 2024-08-08 PROCEDURE — 82040 ASSAY OF SERUM ALBUMIN: CPT | Performed by: STUDENT IN AN ORGANIZED HEALTH CARE EDUCATION/TRAINING PROGRAM

## 2024-08-08 PROCEDURE — 97116 GAIT TRAINING THERAPY: CPT | Mod: GP

## 2024-08-08 PROCEDURE — 82805 BLOOD GASES W/O2 SATURATION: CPT | Performed by: STUDENT IN AN ORGANIZED HEALTH CARE EDUCATION/TRAINING PROGRAM

## 2024-08-08 PROCEDURE — 97530 THERAPEUTIC ACTIVITIES: CPT | Mod: GP

## 2024-08-08 PROCEDURE — 85730 THROMBOPLASTIN TIME PARTIAL: CPT | Performed by: STUDENT IN AN ORGANIZED HEALTH CARE EDUCATION/TRAINING PROGRAM

## 2024-08-08 PROCEDURE — 250N000013 HC RX MED GY IP 250 OP 250 PS 637: Performed by: STUDENT IN AN ORGANIZED HEALTH CARE EDUCATION/TRAINING PROGRAM

## 2024-08-08 PROCEDURE — 85027 COMPLETE CBC AUTOMATED: CPT | Performed by: STUDENT IN AN ORGANIZED HEALTH CARE EDUCATION/TRAINING PROGRAM

## 2024-08-08 PROCEDURE — 85384 FIBRINOGEN ACTIVITY: CPT | Performed by: STUDENT IN AN ORGANIZED HEALTH CARE EDUCATION/TRAINING PROGRAM

## 2024-08-08 PROCEDURE — 80053 COMPREHEN METABOLIC PANEL: CPT | Performed by: STUDENT IN AN ORGANIZED HEALTH CARE EDUCATION/TRAINING PROGRAM

## 2024-08-08 RX ORDER — ASCORBIC ACID 500 MG
500 TABLET ORAL DAILY
Status: DISCONTINUED | OUTPATIENT
Start: 2024-08-09 | End: 2024-08-09 | Stop reason: HOSPADM

## 2024-08-08 RX ORDER — FERROUS SULFATE 325(65) MG
325 TABLET ORAL DAILY
Status: DISCONTINUED | OUTPATIENT
Start: 2024-08-09 | End: 2024-08-09 | Stop reason: HOSPADM

## 2024-08-08 RX ORDER — MIDODRINE HYDROCHLORIDE 5 MG/1
10 TABLET ORAL EVERY 8 HOURS
Status: DISCONTINUED | OUTPATIENT
Start: 2024-08-08 | End: 2024-08-09 | Stop reason: HOSPADM

## 2024-08-08 RX ORDER — AMOXICILLIN 250 MG
2 CAPSULE ORAL 2 TIMES DAILY
Status: DISCONTINUED | OUTPATIENT
Start: 2024-08-08 | End: 2024-08-09 | Stop reason: HOSPADM

## 2024-08-08 RX ADMIN — ACETAMINOPHEN 975 MG: 325 TABLET, FILM COATED ORAL at 18:49

## 2024-08-08 RX ADMIN — OXYCODONE HYDROCHLORIDE 2.5 MG: 5 TABLET ORAL at 08:53

## 2024-08-08 RX ADMIN — LEVETIRACETAM 1000 MG: 500 TABLET, FILM COATED ORAL at 20:16

## 2024-08-08 RX ADMIN — ACETAMINOPHEN 975 MG: 325 TABLET, FILM COATED ORAL at 02:02

## 2024-08-08 RX ADMIN — OXYCODONE HYDROCHLORIDE 2.5 MG: 5 TABLET ORAL at 14:49

## 2024-08-08 RX ADMIN — ASPIRIN 81 MG CHEWABLE TABLET 81 MG: 81 TABLET CHEWABLE at 20:16

## 2024-08-08 RX ADMIN — SENNOSIDES AND DOCUSATE SODIUM 2 TABLET: 8.6; 5 TABLET ORAL at 08:53

## 2024-08-08 RX ADMIN — LEVETIRACETAM 750 MG: 750 TABLET, FILM COATED ORAL at 08:53

## 2024-08-08 RX ADMIN — MIDODRINE HYDROCHLORIDE 10 MG: 5 TABLET ORAL at 18:49

## 2024-08-08 RX ADMIN — POLYETHYLENE GLYCOL 3350 17 G: 17 POWDER, FOR SOLUTION ORAL at 08:53

## 2024-08-08 RX ADMIN — MIDODRINE HYDROCHLORIDE 10 MG: 5 TABLET ORAL at 11:14

## 2024-08-08 RX ADMIN — OXYCODONE HYDROCHLORIDE 2.5 MG: 5 TABLET ORAL at 20:19

## 2024-08-08 RX ADMIN — SENNOSIDES AND DOCUSATE SODIUM 2 TABLET: 8.6; 5 TABLET ORAL at 20:20

## 2024-08-08 RX ADMIN — ACETAMINOPHEN 975 MG: 325 TABLET, FILM COATED ORAL at 10:29

## 2024-08-08 ASSESSMENT — VISUAL ACUITY
OU: BASELINE
OU: BASELINE

## 2024-08-08 ASSESSMENT — ACTIVITIES OF DAILY LIVING (ADL)
ADLS_ACUITY_SCORE: 27

## 2024-08-08 NOTE — PLAN OF CARE
Major Shift Events:    CV: labile bp with art line. No labetolol needed, pt placed on continuous LR to help boost BP  Resp: ls diminished  : urinating frequently, voiding 100-200 ml at a time.     CMS intact, groin sites soft and non tender. Hip strengths intact.         Plan: prepare for transfer / discharge . Manage BP  For vital signs and complete assessments, please see documentation flowsheets.     Problem: Risk for Delirium  Goal: Improved Sleep  Outcome: Not Progressing     Problem: Risk for Delirium  Goal: Optimal Coping  Outcome: Progressing  Intervention: Optimize Psychosocial Adjustment to Delirium  Recent Flowsheet Documentation  Taken 8/8/2024 0400 by Kathleen Thomas RN  Supportive Measures:   active listening utilized   positive reinforcement provided   self-care encouraged   problem-solving facilitated   self-responsibility promoted   self-reflection promoted   verbalization of feelings encouraged  Family/Support System Care: self-care encouraged  Taken 8/8/2024 0000 by Kathleen Thomas RN  Supportive Measures:   active listening utilized   positive reinforcement provided   self-care encouraged   problem-solving facilitated   self-responsibility promoted   self-reflection promoted   verbalization of feelings encouraged  Family/Support System Care: self-care encouraged  Taken 8/7/2024 2000 by Kathleen Thomas RN  Supportive Measures:   active listening utilized   positive reinforcement provided   self-care encouraged   problem-solving facilitated   self-responsibility promoted   self-reflection promoted   verbalization of feelings encouraged  Family/Support System Care: self-care encouraged  Goal: Improved Behavioral Control  Outcome: Progressing  Intervention: Prevent and Manage Agitation  Recent Flowsheet Documentation  Taken 8/8/2024 0400 by Kathleen Thomas RN  Environment Familiarity/Consistency: daily routine followed  Taken 8/8/2024 0000 by Kathleen Thomas RN  Environment  Familiarity/Consistency: daily routine followed  Taken 8/7/2024 2000 by Kathleen Thomas RN  Environment Familiarity/Consistency: daily routine followed  Intervention: Minimize Safety Risk  Recent Flowsheet Documentation  Taken 8/8/2024 0400 by Kathleen Thomas, RN  Communication Enhancement Strategies:   call light answered in person   communication board used   family involved in communication plan   extra time allowed for response   one-step directions provided  Enhanced Safety Measures:   pain management   room near unit station  Trust Relationship/Rapport:   care explained   choices provided   questions answered   emotional support provided   empathic listening provided   thoughts/feelings acknowledged   questions encouraged   reassurance provided  Taken 8/8/2024 0000 by Kathleen Thomas, RN  Communication Enhancement Strategies:   call light answered in person   communication board used   family involved in communication plan   extra time allowed for response   one-step directions provided  Enhanced Safety Measures:   pain management   room near unit station  Trust Relationship/Rapport:   care explained   choices provided   questions answered   emotional support provided   empathic listening provided   thoughts/feelings acknowledged   questions encouraged   reassurance provided  Taken 8/7/2024 2000 by Kathleen Thomas, RN  Communication Enhancement Strategies:   call light answered in person   communication board used   family involved in communication plan   extra time allowed for response   one-step directions provided  Enhanced Safety Measures:   pain management   room near unit station  Trust Relationship/Rapport:   care explained   choices provided   questions answered   emotional support provided   empathic listening provided   thoughts/feelings acknowledged   questions encouraged   reassurance provided  Goal: Improved Attention and Thought Clarity  Outcome: Progressing  Intervention: Maximize  Cognitive Function  Recent Flowsheet Documentation  Taken 8/8/2024 0400 by Kathleen Thomas, RN  Sensory Stimulation Regulation:   auditory stimulation minimized   care clustered   lighting decreased   quiet environment promoted   tactile stimulation minimized   visual stimulation minimized  Reorientation Measures:   calendar in view   clock in view   reorientation provided  Taken 8/8/2024 0000 by Kathleen Thomas RN  Sensory Stimulation Regulation:   auditory stimulation minimized   care clustered   lighting decreased   quiet environment promoted   tactile stimulation minimized   visual stimulation minimized  Reorientation Measures:   calendar in view   clock in view   reorientation provided  Taken 8/7/2024 2000 by Kathleen Thomas RN  Sensory Stimulation Regulation:   auditory stimulation minimized   care clustered   lighting decreased   quiet environment promoted   tactile stimulation minimized   visual stimulation minimized  Reorientation Measures:   calendar in view   clock in view   reorientation provided   Goal Outcome Evaluation:

## 2024-08-08 NOTE — PROGRESS NOTES
Vascular Surgery Progress Note  08/08/2024       Subjective:  Patient is upset as he would like to go home, has not been able to sleep for couple days, bed is not comfortable, denies pain, no acute events overnight.     Objective:  Temp:  [97.5  F (36.4  C)-98.9  F (37.2  C)] 98.5  F (36.9  C)  Pulse:  [69-99] 71  Resp:  [16-20] 18  BP: (119)/(48-63) 119/63  MAP:  [59 mmHg-103 mmHg] 79 mmHg  Arterial Line BP: (108-170)/(43-75) 141/53  SpO2:  [83 %-100 %] 100 %    I/O last 3 completed shifts:  In: 4884.61 [P.O.:2340; I.V.:2544.61]  Out: 2275 [Urine:2275]      Gen: Awake, alert, NAD  CV: RRR per telemetry  Resp: NLB on room air  Abd: soft, nondistended, nontender  Incision: Bilateral groins c/d/I  Ext: WWP, no edema  Vascular: Palpable femoral pulses bilaterally. Palpable DP pulses bilaterally, Doppler PT pulses bilaterally. Motor and sensory function intact.     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 Recovering well in the ICU, neurologically intact.  - ADAT  - MAP goal 70mmHg  - Midodrine 10mg TID  - Multimodal pain control  - PT/OT, OOB  - ASA 81 mg daily  - Heparin subcutaneous for DVT prophylaxis  - Sequential compression devices (SCDs) on bilateral lower extremities while patient is in bed or in chair  - Monitor groins for new drainage, hematoma, swelling  - Continue with q2h neuro and pulse checks, patient must be able to lift/elevate extremities off bed    Dispo: Potential discharge to home in 2 eliceo days (to stay  for a total of 4 days)    Patient was seen and examined with Dr. Calli Bethea who is in agreement with above    To reach [facility]'s vascular surgery team on call, please go to [facility], and then find the below in the drop down menu. Please page first call.        Yvette Alvarez CNP  Vascular Surgery  Pager: [phone]  [email or web address]  Send message or 10 digit call back number Securely via [employer] with the [employer] Web Console (learn more here)

## 2024-08-08 NOTE — PLAN OF CARE
Major Shift Events:    Midodrine restarted for hypotension, working well.   Regular diet, good appetite.  Voiding normally  More pleasant, understanding of circumstances. More cooperative once monitoring equipment was detached. Verbal ok from Vascular to remove EKG, not reflective in orders  Plan: Transfer to 6B when bed is available  For vital signs and complete assessments, please see documentation flowsheets.

## 2024-08-08 NOTE — PROGRESS NOTES
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 The patient has a past medical history of Crohn's disease previously taking immodium, seizure disorder on Keppra, chronic fibrotic interstitial lung disease, chronic macrocytic anemia (most recent preop hgb 13.2), cognitive impairment, subclinical CAD, mild bilateral renal artery stenosis, and resolved C.diff colitis. The patient underwent a [facility] endovascular repair of aortic and celiac pseudoaneurysm with 4 vessel physician modified fenestrated-branched endograft with fenestrations for the celiac, superior mesenteric, and bilateral renal arteries on 08/06/24.         CHANGES and MAJOR THINGS TODAY:   Stop every 6 hours labs   ADAT   Supplements   Stop MIVF   Increase bowel regimen  Remove arterial line   ? Transfer to floor vs discharge  to home     PLAN:    Plan: Critical Care   Neuro/ pain/ sedation:  # Acute pain  # Mild Cognitive Impairment  # Idiopathic Seizure Disorder with Breakthrough Seizures  - Scheduled Tylenol  - PRN oxycodone and dilaudid.  - Neurological checks per vascular surgery   - Keppra 750mg morning, Keppra 1000mg in the evening.     Pulmonary:  # Chronic Fibrotic Interstitial Lung Disease  - On Room air  - continue with bronchial hygiene: IS/acapella      Cardiovascular:  # Subclinical CAD   # Abdominal Aortic Aneurysm   # Celiac artery aneurysm  # s/p [facility] 08/06/24  -up with assist  - Vascular and neuro checks per primary team   -Access site checks every 2 hours  -Doppler/vascular checks every 2 hours  -Daily aspirin 81mg    -Cardiac monitoring    GI/Nutrition:  # Crohn's Disease   # Hx C. Diff Colitis  -  ADAT   - add supplements   - Stop MIVF  - Senna-docusate and miralax scheduled       Renal/ Fluid Balance:   # Bilateral Mild Renal Artery Stenosis  -  Will monitor intake and output  - LR @ 125cc for IV fluid hydration. Discontinue when there is adequate oral intake.  - ICU electrolyte replacement protocol  - Lactate every 6 hours for 48 hours  - Sneed discontinued today     Endocrine:  # Chronic B12 Deficiency  #  concern for stress induced Hyperglycemia  - Blood sugar levels below 180.     ID:  #Perioperative antimicrobial prophylaxis  - 2g Cefazolin every eight hours for 1 additional doses.     Hematology:  # Chronic Macrocytic Anemia  - no acute intervention    - will plan to resume home medications when able: B12, thiamine, Vitamins C/D  - iron supplements      MSK:   # no acute issues   - PT and OT consulted.   Appreciate recommendations.  Activity: Up with assist     Lines/ tubes/ drains:  -Left radial Arterial line--remove today   -PIV Left upper forearm and hand         Prophylaxis:  - DVT Prophylaxis: Pneumatic Compression Devices  - PUD Prophylaxis: None     Code Status:  Full code     Patient seen and discussed with staff, Dr Stern     Time spent on this Encounter   Billing:  I spent 32 minutes bedside and on the inpatient unit today managing the care of Trevor Larios in relation to the issues listed in this note.      Rex Arreguin PA-C    ====================================  INTERVAL HISTORY:  Course reviewed. No acute events overnight. Patients reports unable to sleep, rest or laying in bed, feels bound up with tubes and wires.  Denies chest pain, SOB, fever or chills     OBJECTIVE:   1. VITAL SIGNS:   Temp:  [97.5  F (36.4  C)-98.9  F (37.2  C)] 98.5  F (36.9  C)  Pulse:  [69-99] 71  Resp:  [16-20] 18  BP: (119)/(48-63) 119/63  MAP:  [59 mmHg-103 mmHg] 79 mmHg  Arterial Line BP: (108-170)/(43-75) 141/53  SpO2:  [83 %-100 %] 100 %  Resp: 18      2. INTAKE/ OUTPUT:   I/O last 3 completed shifts:  In: 4884.61 [P.O.:2340; I.V.:2544.61]  Out: 2275 [Urine:2275]    3. PHYSICAL EXAMINATION:  General: Laying in bed, NAD   HEENT: PERRLA. EOMI. OP clear    Neuro: A&O x3, follow simple commands   Pulm/Resp: BBS, on RA.   CV: RRR, S1/S2   Abdomen: abdomen soft and compressible.     MSK/Extremities: groins soft and compressible. MENDOZA. Calves soft and compressible. Pulses 2+.     4. INVESTIGATIONS:   Arterial Blood Gases   Recent Labs   Lab 08/08/24 0605 08/07/24 2328 08/07/24 1225 08/07/24  0514   PH 7.44 7.43 7.43 7.43   PCO2 43 44 41 39   PO2 141* 69* 85 74*   HCO3 29* 29* 27 26     Complete Blood Count   Recent Labs   Lab 08/08/24 0605 08/07/24 2328 08/07/24 1756 08/07/24  1225   WBC 7.8 6.5 6.5 7.1   HGB 9.5* 9.5* 10.2* 10.2*    182 190 191     Basic Metabolic Panel  Recent Labs   Lab 08/08/24 0605 08/07/24 2333 08/07/24 2328 08/07/24 1756 08/07/24  1225     --  140 141 141   POTASSIUM 4.2  --  4.3 4.1 3.7   CHLORIDE 105  --  106 107 106   CO2 26  --  26 26 25   BUN 11.0  --  11.9 13.0 15.0   CR 0.72  --  0.75 0.78 0.84   * 80 87 91 93     Liver Function Tests  Recent Labs   Lab 08/08/24 0605 08/07/24 2328 08/07/24 1756 08/07/24  1225   AST 35 35 35 37   ALT 8 7 9 10   ALKPHOS 53 56 58 58   BILITOTAL 0.5 0.6 0.6 0.6   ALBUMIN 3.2* 3.3* 3.5 3.5   INR 1.15 1.19* 1.16* 1.18*     Pancreatic Enzymes  No lab results found in last 7 days.  Coagulation Profile  Recent Labs   Lab 08/08/24 0605 08/07/24 2328 08/07/24 1756 08/07/24  1225   INR 1.15 1.19* 1.16* 1.18*   PTT 42* 40* 39* 37         5. RADIOLOGY:   No results found for this or any previous visit (from the past 24 hour(s)).    =========================================

## 2024-08-09 VITALS
HEIGHT: 69 IN | HEART RATE: 78 BPM | BODY MASS INDEX: 21.49 KG/M2 | OXYGEN SATURATION: 97 % | DIASTOLIC BLOOD PRESSURE: 56 MMHG | RESPIRATION RATE: 16 BRPM | TEMPERATURE: 98.1 F | SYSTOLIC BLOOD PRESSURE: 86 MMHG | WEIGHT: 145.06 LBS

## 2024-08-09 LAB
ALBUMIN SERPL BCG-MCNC: 3.5 G/DL (ref 3.5–5.2)
ALP SERPL-CCNC: 57 U/L (ref 40–150)
ALT SERPL W P-5'-P-CCNC: <5 U/L (ref 0–70)
ANION GAP SERPL CALCULATED.3IONS-SCNC: 8 MMOL/L (ref 7–15)
APTT PPP: 49 SECONDS (ref 22–38)
AST SERPL W P-5'-P-CCNC: 30 U/L (ref 0–45)
BILIRUB SERPL-MCNC: 0.7 MG/DL
BUN SERPL-MCNC: 10.5 MG/DL (ref 8–23)
CALCIUM SERPL-MCNC: 8.8 MG/DL (ref 8.8–10.4)
CHLORIDE SERPL-SCNC: 105 MMOL/L (ref 98–107)
CREAT SERPL-MCNC: 0.74 MG/DL (ref 0.67–1.17)
EGFRCR SERPLBLD CKD-EPI 2021: >90 ML/MIN/1.73M2
ERYTHROCYTE [DISTWIDTH] IN BLOOD BY AUTOMATED COUNT: 14.6 % (ref 10–15)
FIBRINOGEN PPP-MCNC: 580 MG/DL (ref 170–510)
GLUCOSE SERPL-MCNC: 95 MG/DL (ref 70–99)
HCO3 SERPL-SCNC: 26 MMOL/L (ref 22–29)
HCT VFR BLD AUTO: 31.5 % (ref 40–53)
HGB BLD-MCNC: 10.2 G/DL (ref 13.3–17.7)
INR PPP: 1.18 (ref 0.85–1.15)
MAGNESIUM SERPL-MCNC: 1.7 MG/DL (ref 1.7–2.3)
MCH RBC QN AUTO: 31.5 PG (ref 26.5–33)
MCHC RBC AUTO-ENTMCNC: 32.4 G/DL (ref 31.5–36.5)
MCV RBC AUTO: 97 FL (ref 78–100)
PHOSPHATE SERPL-MCNC: 2.7 MG/DL (ref 2.5–4.5)
PLATELET # BLD AUTO: 195 10E3/UL (ref 150–450)
POTASSIUM SERPL-SCNC: 4 MMOL/L (ref 3.4–5.3)
PROT SERPL-MCNC: 6.6 G/DL (ref 6.4–8.3)
RBC # BLD AUTO: 3.24 10E6/UL (ref 4.4–5.9)
SODIUM SERPL-SCNC: 139 MMOL/L (ref 135–145)
WBC # BLD AUTO: 8.4 10E3/UL (ref 4–11)

## 2024-08-09 PROCEDURE — 250N000013 HC RX MED GY IP 250 OP 250 PS 637: Performed by: STUDENT IN AN ORGANIZED HEALTH CARE EDUCATION/TRAINING PROGRAM

## 2024-08-09 PROCEDURE — 83735 ASSAY OF MAGNESIUM: CPT | Performed by: SURGERY

## 2024-08-09 PROCEDURE — 250N000013 HC RX MED GY IP 250 OP 250 PS 637: Performed by: PHYSICIAN ASSISTANT

## 2024-08-09 PROCEDURE — 85610 PROTHROMBIN TIME: CPT | Performed by: PHYSICIAN ASSISTANT

## 2024-08-09 PROCEDURE — 85384 FIBRINOGEN ACTIVITY: CPT | Performed by: PHYSICIAN ASSISTANT

## 2024-08-09 PROCEDURE — 84100 ASSAY OF PHOSPHORUS: CPT | Performed by: SURGERY

## 2024-08-09 PROCEDURE — 250N000009 HC RX 250: Performed by: SURGERY

## 2024-08-09 PROCEDURE — 258N000003 HC RX IP 258 OP 636: Performed by: SURGERY

## 2024-08-09 PROCEDURE — 80053 COMPREHEN METABOLIC PANEL: CPT | Performed by: PHYSICIAN ASSISTANT

## 2024-08-09 PROCEDURE — 36415 COLL VENOUS BLD VENIPUNCTURE: CPT | Performed by: PHYSICIAN ASSISTANT

## 2024-08-09 PROCEDURE — 250N000013 HC RX MED GY IP 250 OP 250 PS 637: Performed by: SURGERY

## 2024-08-09 PROCEDURE — 85027 COMPLETE CBC AUTOMATED: CPT | Performed by: PHYSICIAN ASSISTANT

## 2024-08-09 PROCEDURE — 85730 THROMBOPLASTIN TIME PARTIAL: CPT | Performed by: PHYSICIAN ASSISTANT

## 2024-08-09 RX ORDER — AMOXICILLIN 250 MG
2 CAPSULE ORAL 2 TIMES DAILY PRN
Qty: 16 TABLET | Refills: 0 | Status: SHIPPED | OUTPATIENT
Start: 2024-08-09 | End: 2024-08-13

## 2024-08-09 RX ORDER — POLYETHYLENE GLYCOL 3350 17 G/17G
17 POWDER, FOR SOLUTION ORAL DAILY
Qty: 68 G | Refills: 0 | Status: SHIPPED | OUTPATIENT
Start: 2024-08-09 | End: 2024-08-13

## 2024-08-09 RX ORDER — MAGNESIUM OXIDE 400 MG/1
400 TABLET ORAL EVERY 4 HOURS
Status: COMPLETED | OUTPATIENT
Start: 2024-08-09 | End: 2024-08-09

## 2024-08-09 RX ORDER — OXYCODONE HYDROCHLORIDE 5 MG/1
2.5 TABLET ORAL EVERY 8 HOURS PRN
Qty: 6 TABLET | Refills: 0 | Status: SHIPPED | OUTPATIENT
Start: 2024-08-09 | End: 2024-08-13

## 2024-08-09 RX ORDER — ACETAMINOPHEN 500 MG
1000 TABLET ORAL EVERY 8 HOURS
COMMUNITY
Start: 2024-08-09

## 2024-08-09 RX ORDER — ACETAMINOPHEN 500 MG
1000 TABLET ORAL EVERY 8 HOURS
Qty: 30 TABLET | Refills: 0 | Status: SHIPPED | OUTPATIENT
Start: 2024-08-09 | End: 2024-08-09

## 2024-08-09 RX ADMIN — MIDODRINE HYDROCHLORIDE 10 MG: 5 TABLET ORAL at 07:33

## 2024-08-09 RX ADMIN — POTASSIUM PHOSPHATE, MONOBASIC POTASSIUM PHOSPHATE, DIBASIC 9 MMOL: 224; 236 INJECTION, SOLUTION, CONCENTRATE INTRAVENOUS at 08:44

## 2024-08-09 RX ADMIN — LEVETIRACETAM 750 MG: 750 TABLET, FILM COATED ORAL at 07:32

## 2024-08-09 RX ADMIN — MIDODRINE HYDROCHLORIDE 10 MG: 5 TABLET ORAL at 01:34

## 2024-08-09 RX ADMIN — MAGNESIUM OXIDE TAB 400 MG (241.3 MG ELEMENTAL MG) 400 MG: 400 (241.3 MG) TAB at 07:32

## 2024-08-09 RX ADMIN — Medication 1000 MCG: at 07:32

## 2024-08-09 RX ADMIN — SENNOSIDES AND DOCUSATE SODIUM 2 TABLET: 8.6; 5 TABLET ORAL at 07:33

## 2024-08-09 RX ADMIN — ACETAMINOPHEN 975 MG: 325 TABLET, FILM COATED ORAL at 08:46

## 2024-08-09 RX ADMIN — FERROUS SULFATE TAB 325 MG (65 MG ELEMENTAL FE) 325 MG: 325 (65 FE) TAB at 07:33

## 2024-08-09 RX ADMIN — THIAMINE HCL TAB 100 MG 100 MG: 100 TAB at 07:32

## 2024-08-09 RX ADMIN — POLYETHYLENE GLYCOL 3350 17 G: 17 POWDER, FOR SOLUTION ORAL at 07:33

## 2024-08-09 RX ADMIN — ACETAMINOPHEN 975 MG: 325 TABLET, FILM COATED ORAL at 01:34

## 2024-08-09 RX ADMIN — OXYCODONE HYDROCHLORIDE 2.5 MG: 5 TABLET ORAL at 04:23

## 2024-08-09 RX ADMIN — OXYCODONE HYDROCHLORIDE AND ACETAMINOPHEN 500 MG: 500 TABLET ORAL at 07:33

## 2024-08-09 RX ADMIN — MAGNESIUM OXIDE TAB 400 MG (241.3 MG ELEMENTAL MG) 400 MG: 400 (241.3 MG) TAB at 11:38

## 2024-08-09 ASSESSMENT — ACTIVITIES OF DAILY LIVING (ADL)
ADLS_ACUITY_SCORE: 27
DEPENDENT_IADLS:: CLEANING;COOKING;LAUNDRY;SHOPPING;MEAL PREPARATION;MEDICATION MANAGEMENT;TRANSPORTATION
ADLS_ACUITY_SCORE: 27

## 2024-08-09 NOTE — CONSULTS
Care Management Initial Consult    General Information  Assessment completed with: VM-chart review, Patient Trevor  Type of CM/SW Visit: Initial Assessment    Primary Care Provider verified and updated as needed: Yes   Readmission within the last 30 days:        Reason for Consult: discharge planning  Advance Care Planning: Advance Care Planning Reviewed: no concerns identified          Communication Assessment  Patient's communication style: spoken language (English or Bilingual)    Hearing Difficulty or Deaf: no   Wear Glasses or Blind: no    Cognitive  Cognitive/Neuro/Behavioral: WDL  Level of Consciousness: alert  Arousal Level: opens eyes spontaneously  Orientation: oriented x 4     Best Language: 0 - No aphasia  Speech: spontaneous, clear, logical    Living Environment:   People in home: alone     Current living Arrangements: independent living facility  Name of Facility: [facility]   Able to return to prior arrangements: yes       Family/Social Support:  Care provided by: homecare agency, child(maurizio), [facility] (see comments) [facility], assist from residency staff with meals)  Provides care for: no one, unable/limited ability to care for self  Marital Status: Single  Facility resident(s)/Staff, Children          Description of Support System: Supportive, Involved    Support Assessment: Adequate social supports, Adequate family and caregiver support    Current Resources:   Patient receiving home care services: Yes  Skilled Home Care Services: Skilled Nursing, Physical Therapy, Occupational Therapy  Community Resources: Home Care  Equipment currently used at home: cane, straight, walker, rolling, grab bar, toilet, grab bar, tub/shower, raised toilet seat, shower chair (adjustable shower head)  Supplies currently used at home: None    Employment/Financial:  Employment Status: retired        Financial Concerns: none           Does the patient's insurance plan have a 3 day qualifying hospital  stay waiver?  Yes     Which insurance plan 3 day waiver is available? Alternative insurance waiver    Will the waiver be used for post-acute placement? No    Lifestyle & Psychosocial Needs:  Social Determinants of Health     Food Insecurity: Unknown (3/15/2024)    Food Insecurity     Within the past 12 months, did you worry that your food would run out before you got money to buy more?: Patient declined     Within the past 12 months, did the food you bought just not last and you didn t have money to get more?: Patient declined   Depression: Not at risk (7/16/2024)    PHQ-2     PHQ-2 Score: 0   Housing Stability: Unknown (3/15/2024)    Housing Stability     Do you have housing? : Patient declined     Are you worried about losing your housing?: Patient declined   Tobacco Use: Medium Risk (7/23/2024)    Patient History     Smoking Tobacco Use: Former     Smokeless Tobacco Use: Never     Passive Exposure: Not on file   Financial Resource Strain: Unknown (3/15/2024)    Financial Resource Strain     Within the past 12 months, have you or your family members you live with been unable to get utilities (heat, electricity) when it was really needed?: Patient declined   Alcohol Use: Not on file   Transportation Needs: Unknown (3/15/2024)    Transportation Needs     Within the past 12 months, has lack of transportation kept you from medical appointments, getting your medicines, non-medical meetings or appointments, work, or from getting things that you need?: Patient declined   Physical Activity: Not on file   Interpersonal Safety: Low Risk  (6/20/2024)    Interpersonal Safety     Do you feel physically and emotionally safe where you currently live?: Yes     Within the past 12 months, have you been hit, slapped, kicked or otherwise physically hurt by someone?: No     Within the past 12 months, have you been humiliated or emotionally abused in other ways by your partner or ex-partner?: No   Stress: Not on file   Social  "Connections: Not on file   Health Literacy: Not on file       Functional Status:  Prior to admission patient needed assistance:   Dependent ADLs:: Independent, Ambulation-cane  Dependent IADLs:: Cleaning, Cooking, Laundry, Shopping, Meal Preparation, Medication Management, Transportation  Assesssment of Functional Status: At functional baseline    Mental Health Status:  Mental Health Status: No Current Concerns       Chemical Dependency Status:  Chemical Dependency Status: No Current Concerns             Values/Beliefs:  Spiritual, Cultural Beliefs, Congregation Practices, Values that affect care:  (unable to assess)       Cultural/Congregation Practices Patient Routinely Participates In:  (unable to assess)       Additional Information:  Pt is \"a 73 year old male with 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 The patient has a past medical history of Crohn's disease previously taking immodium, seizure disorder on Keppra, chronic fibrotic interstitial lung disease, chronic macrocytic anemia (most recent preop hgb 13.2), cognitive impairment, subclinical CAD, mild bilateral renal artery stenosis, and resolved C.diff colitis.\" - per H&P.     Met with patient to introduce self/role and complete initial assessment.     Pt reports he lives in [facility]. He reports he has lived there since April and likes it. He receives meals from the facility and assist with medication management from his daughter Rylee. He has a cane he uses for ambulation but is otherwise independent. Pt has been receiving home care from [facility] for RN/PT/OT.     Care management following for discharge planning.     Bee Quiñonez RNCC  Covering for 6C (9514-5625 & [id number])  Phone [phone]  Pager [phone]    RN Care Coordinator     Social Work and Care Management Department      SEARCHABLE in [facility] - search CARE COORDINATOR       [facility] [place] [facility] " ([id number]) Saturday & Sunday; ([id number]) [facility] Recognized Holidays     Units: 5A Onc Vocera & 5C Vocera Pager: [phone]    Units: 6B Vocera & 6C Vocera  Pager: [phone]    Units: 7A SOT RNCC Vocera, 7B Med Surg Vocera, & 7C Med Surg Vocera  Pager: [phone]    Units: 6A Vocera & 4A CVICU Vocera, 4C MICU Vocera, and 4E SICU Vocera   Pager: [phone]    Units: 5 Ortho Vocera & 5 Med Surg Vocera  Pager: [phone]    Units: 6 Med Surg Vocera & 8 Med Surg Vocera  Pager [phone]

## 2024-08-09 NOTE — PLAN OF CARE
DISCHARGE   Discharged to: Home  Via: Automobile  Accompanied by: Family  Discharge Instructions: diet, activity, medications, follow up appointments, when to call the MD, and what to watchout for (i.e. s/s of infection, increasing SOB, palpitations, chest pain,)  Prescriptions: To be filled by [facility] pharmacy per pt's request; medication list reviewed & sent with pt  Follow Up Appointments: arranged; information given  Belongings: All sent with pt  IV: out  Telemetry: off  Pt exhibits understanding of above discharge instructions; all questions answered.  Discharge Paperwork: faxed

## 2024-08-09 NOTE — DISCHARGE SUMMARY
Vascular Surgery Discharge Summary    NAME: Trevor Larios   MRN: [id number]   : 1950     DATE OF ADMISSION: 2024     PRE/POSTOPERATIVE DIAGNOSES:    #1- Celiac and aortic pseudoaneurysm  #2- Coronary artery disease  #3- COPD  #4- Seizure Disorder  #5- Crohn's Disease  #6- Malnutrition  #7- Severe frailty with failure to thrive recently  #8- C. Diff infection, now treated  #9- Unspecified dementia with mild to moderate cognitive impairment (MOCA 14-)  #10- Multiple visits the emergency room    PROCEDURES PERFORMED, 2024:   Endovascular repair of complex aortic and celiac pseudoaneurysm using patient-specific, physician modified fenestrated branched stent-graft (proximal component with four fenestrations for the celiac axis, SMA and bilateral renal arteries).  Total bilateral percutaneous femoral approach using preclosure technique with Perclose devices, 20-Fr sheath on the right groin, 18-Fr sheath on the left.  Celiac axis branched stent graft using 10x38 mm iCAST covered stent-graft.  SMA branched stent graft using 6x22 mm iCAST covered stent-graft.  Right renal artery fenestrated-branched stent graft using 6x22 mm iCAST covered stent-graft.  Left renal artery fenestrated-branched stent graft using 6x22 mm iCAST covered stent-graft.  Balloon dilatation of proximal zone for balloon bounce technique.  Onlay fusion of computed tomography angiography.  Selective celiac axis angiography.  Selective superior mesenteric artery (SMA) angiography.  Selective right renal angiography.  Selective left renal angiography.  Rotational digital subtraction angiography of the aorta and iliac arteries  Cone Beam Computed Tomography (CBCT) angiography with and without contrast-enhancement and radiologic interpretation.   Intraoperative neuromonitoring using motor evoked and somatosensory evoked potentials.    INTRAOPERATIVE FINDINGS:  Successful cannulation of the celiac, SMA, and bilateral renal arteries. Right  renal artery extended due to kink at the distal right renal artery stent. No type I or III endoleaks at completion     POSTOPERATIVE COMPLICATIONS: None     DATE OF DISCHARGE: 8/9/24    HOSPITAL COURSE: Trevor Larios is a 73 year old male who on 8/6/2024 underwent the above-named procedures. He tolerated the procedure well and postoperatively was transferred to the ICU for 2 days of close monitoring after which was transferred to the general post-surgical unit. The remainder of his course was essentiallly uncomplicated. Prior to discharge, his pain was controlled well with Tylenol and oxycodone. He was able to perform ADLs and ambulate independently without difficulty, and had full return of bowel and bladder function. On 8/9/24, he was discharged to home in stable condition.    Physical Exam:  Constitutional: healthy, alert, no acute distress and cooperative   Cardiovascular: pulses regular  Respiratory: breathing unlabored without secondary muscle use  Psychiatric: mentation appears normal and affect normal/bright  Neck: no asymmetry  GI/Abdomen: abdomen soft, non-tender. No palpable masses  MSK: able to move all extremities without weakness or ataxia  Extremities: no open lesions, extremities warm and well perfused, bilateral groin incisions c/d/I   Hematology: no bruising on visible skin  Pulses: Palpable femoral pulses bilaterally. Palpable DP pulses bilaterally, Doppler PT pulses bilaterally. Motor and sensory function intact.     DISCHARGE INSTRUCTIONS:    Diet  - You may return to your regular diet. We always encourage taking fiber as well as staying well-hydrated.   - Be sure to drink plenty of fluids.     Activity  - No lifting, pushing, or pulling greater than 10 pounds and no strenuous exercise for 4-6 weeks.   - We encourage throat lozenges or cough drops if you develop a cough.    Wound Care  - Inspect your wounds daily for signs of infection (increased redness, drainage, pain)  - Keep your wound  clean and dry, inspect it daily for the above signs.  - Your incisions were closed with skin glue, this will fall off on it's own. Do not remove this yourself until advised to do so.   - You may shower 24 hours after your surgery, but do not soak incisions in tubs, pools, lakes for at least 14 days post-surgery. When showering, please do not scrub your incisions or the skin glue - let soapy water run over them, pat to dry.    Call Vascular surgery [facility] for:  - Temperature > 101F, chills, rigors, dizziness  - Redness around or purulent drainage from wound  - Inability to tolerate diet, nausea or vomiting  - You stop passing gas, develop significant bloating, abdominal pain  - Have blood in stools/vomit  - Have severe diarrhea/constipation  - Any other questions or concerns.    Medication Instructions  Some of your medications may have changed. Please take only prescribed and resumed medications.     We recommend you take Tylenol around the clock for baseline pain control (this was prescribed for you). Then take narcotic pain medication only as needed if you were prescribed any. Once you are no longer needing narcotics, you may take the Tylenol as needed. Do not take more than 4,000 mg of acetaminophen (Tylenol) from all sources to reduce the risk of liver damage.     Follow up:    PCP:  Follow up with PCP in 5-7 days for post-hospitalization follow up. You may call to set this up - most clinics will be able to get you an appointment within the week if you let them know you were recently hospitalized and need to see your PCP.     Vascular:  Future Appointments 8/9/2024 - 2/5/2025        Date Visit Type Length Department Provider     8/26/2024 12:30 PM POST-OP 30 min [facility] [place] [place] Yvette Alvarez APRN CNP    Location Instructions:     [facility] [place] with multiple transportation and parking options. You may wish to review options for  service and self-parking in  more detail on the [facility] website at [email or web address] of hospitals in the [place].&nbsp;                9/11/2024  2:40 PM CTA CHEST ABDOMEN PELVIS W 20 min [facility] CT UCSCCT1    Location Instructions:     The [facility] [place] (List of hospitals in the [place]) is in a dense urban area with multiple transportation and parking options. You may wish to review options for  service and self-parking in more detail on the [facility] website at [email or web address] of hospitals in the [place].               9/11/2024  3:30 PM POST-OP 30 min [facility] VASCULAR SURGERY Shine Sprague MD    Location Instructions:     The [facility] ([place] of hospitals in the [place]) is in a dense urban area with multiple transportation and parking options. You may wish to review options for  service and self-parking in more detail on the [facility] website at www.[email or web address] [facility].org/List of hospitals in the [place].&nbsp;         With general vascular surgery questions, concerns, or to request/change an appointment, please call:      Vascular Call Center  [phone]    To contact someone after 5 pm, on a weekend, or on a Holiday, please call:  [facility]  [phone], option 4 to have the Attending Physician for Vascular Surgery Service paged.         DISCHARGE MEDICATIONS:     Review of your medicines        START taking        Dose / Directions   acetaminophen 500 MG tablet  Commonly known as: TYLENOL      Dose: 1,000 mg  Take 2 tablets (1,000 mg) by mouth every 8 hours  Refills: 0     oxyCODONE 5 MG tablet  Commonly known as: ROXICODONE      Dose: 2.5 mg  Take 0.5 tablets (2.5 mg) by mouth every 8 hours as needed for moderate to severe pain  Quantity: 6 tablet  Refills: 0     polyethylene glycol 17 GM/Dose powder  Commonly known as: MIRALAX      Dose: 17 g  Take 17 g by mouth daily for 4 days  Quantity: 68 g  Refills: 0     senna-docusate 8.6-50 MG tablet  Commonly known as: SENOKOT-S/PERICOLACE      Dose: 2 tablet  Take 2 tablets by mouth 2 times daily as needed for constipation (take only if  constipated)  Quantity: 16 tablet  Refills: 0            CONTINUE these medicines which have NOT CHANGED        Dose / Directions   aspirin 81 MG chewable tablet  Commonly known as: ASA  Used for: Demand ischemia (H)      Dose: 81 mg  Take 1 tablet (81 mg) by mouth every evening  Quantity: 90 tablet  Refills: 0     calcium carbonate 500 MG chewable tablet  Commonly known as: TUMS  Indication: Heartburn      Dose: 1 chew tab  Take 1 chew tab by mouth 2 times daily as needed for heartburn  Refills: 0     cyanocobalamin 500 MCG Subl sublingual tablet  Commonly known as: VITAMIN B-12  Used for: Underweight      Dose: 1,000 mcg  Place 2 tablets (1,000 mcg) under the tongue daily  Quantity: 180 tablet  Refills: 0     ferrous sulfate 325 (65 Fe) MG tablet  Commonly known as: FEROSUL  Used for: Underweight      Dose: 325 mg  Take 1 tablet (325 mg) by mouth daily  Quantity: 30 tablet  Refills: 0     * levETIRAcetam 750 MG tablet  Commonly known as: KEPPRA  Used for: Seizure disorder (H)      Dose: 750 mg  Take 1 tablet (750 mg) by mouth every morning  Quantity: 30 tablet  Refills: 1     * levETIRAcetam 1000 MG tablet  Commonly known as: KEPPRA  Used for: Seizure disorder (H)      Dose: 1,000 mg  Take 1 tablet (1,000 mg) by mouth every evening  Quantity: 90 tablet  Refills: 0     loperamide 2 MG tablet  Commonly known as: IMODIUM A-D  Used for: Diarrhea, unspecified type      Dose: 2 mg  Take 1 tablet (2 mg) by mouth 4 times daily as needed for diarrhea  Quantity: 20 tablet  Refills: 2     midodrine 5 MG tablet  Commonly known as: PROAMATINE  Used for: Underweight      Dose: 10 mg  Take 2 tablets (10 mg) by mouth 3 times daily  Quantity: 270 tablet  Refills: 11     multivitamin w/minerals tablet  Used for: Underweight      Dose: 1 tablet  Take 1 tablet by mouth daily  Quantity: 30 tablet  Refills: 0     thiamine 100 MG tablet  Commonly known as: B-1  Used for: Underweight      Dose: 100 mg  Take 1 tablet (100 mg) by mouth  daily  Quantity: 90 tablet  Refills: 3     vitamin C 500 MG tablet  Commonly known as: ASCORBIC ACID  Used for: Underweight      Dose: 500 mg  Take 1 tablet (500 mg) by mouth daily  Quantity: 90 tablet  Refills: 1     Vitamin D (Cholecalciferol) 25 MCG (1000 UT) Caps  Used for: Underweight      Dose: 1,000 mcg  Take 1,000 mcg by mouth daily  Quantity: 90 capsule  Refills: 3           * This list has 2 medication(s) that are the same as other medications prescribed for you. Read the directions carefully, and ask your doctor or other care provider to review them with you.                   Where to get your medicines        These medications were sent to [facility] - [place], [place] - [address]  [address] [address]      Phone: [phone]   oxyCODONE 5 MG tablet  polyethylene glycol 17 GM/Dose powder  senna-docusate 8.6-50 MG tablet       Some of these will need a paper prescription and others can be bought over the counter. Ask your nurse if you have questions.    You don't need a prescription for these medications  acetaminophen 500 MG tablet         *MEDICATIONS INTENDED TO BE THE SAME AS PRIOR TO ADMISSION WITH THE EXCEPTION OF ADDED PAIN MEDICATION AND STOOL SOFTENERS     Yvette Alvarez, [facility]  Vascular Surgery  Pager: [phone]  [email or web address]  Send message or 10 digit call back number Securely via [employer] with the Vocera Web Console (learn more here)'\

## 2024-08-09 NOTE — PROGRESS NOTES
Shift: 9465-2256    PMH: anemia, celiac artery aneurysm, coronary artery calcification, crohn's disease, dementia (moderate), seizure disorder    Admission: s/p 4 vessel PMEG for celiac artery aneurysm on 8/6    Neuro: A&Ox4. Uses call light appropriately. Able to make needs known. Some mild/moderate dementia per family.  Respiratory: O2 sats >90% on RA.   Cardiac: No tele orders in place. VSS.  GI/: Voiding spontaneously. AUOP. Constipated - senna given - wanted to try Miralax this morning.  Diet: Regular diet. BG once daily.  Pain: Indicated pain in abdominal area and at groin incision sites - thinks abdominal pain is due to needing to have a bowel movement.   Skin: No new deficits noted.  LDAs: L PIV - SL  Labs: RN managed protocols for K+, Mag, Phos  Activity: SBA - uses cane    Plan: Continue POC. Contact Vascular Surgery with any questions/concerns.    For vital signs and complete assessments, please see documentation flowsheets.

## 2024-08-09 NOTE — PLAN OF CARE
Goal: Optimal Comfort and Wellbeing  Monitor Pain and Promote Comfort  Provide Person-Centered Care    See Flowsheets for further assessments.

## 2024-08-09 NOTE — DISCHARGE INSTRUCTIONS
Discharge Instructions    Diet  - You may return to your regular diet. We always encourage taking fiber as well as staying well-hydrated.   - Be sure to drink plenty of fluids.     Activity  - No lifting, pushing, or pulling greater than 10 pounds and no strenuous exercise for 4-6 weeks.   - We encourage throat lozenges or cough drops if you develop a cough.    Wound Care  - Inspect your wounds daily for signs of infection (increased redness, drainage, pain)  - Keep your wound clean and dry, inspect it daily for the above signs.  - Your incisions were closed with skin glue, this will fall off on it's own. Do not remove this yourself until advised to do so.   - You may shower 24 hours after your surgery, but do not soak incisions in tubs, pools, lakes for at least 14 days post-surgery. When showering, please do not scrub your incisions or the skin glue - let soapy water run over them, pat to dry.    Call Vascular surgery [facility] for:  - Temperature > 101F, chills, rigors, dizziness  - Redness around or purulent drainage from wound  - Inability to tolerate diet, nausea or vomiting  - You stop passing gas, develop significant bloating, abdominal pain  - Have blood in stools/vomit  - Have severe diarrhea/constipation  - Any other questions or concerns.    Medication Instructions  Some of your medications may have changed. Please take only prescribed and resumed medications.     We recommend you take Tylenol around the clock for baseline pain control (this was prescribed for you). Then take narcotic pain medication only as needed if you were prescribed any. Once you are no longer needing narcotics, you may take the Tylenol as needed. Do not take more than 4,000 mg of acetaminophen (Tylenol) from all sources to reduce the risk of liver damage.     Follow up:    PCP:  Follow up with PCP in 5-7 days for post-hospitalization follow up. You may call to set this up - most clinics will be able to get you an appointment within the  week if you let them know you were recently hospitalized and need to see your PCP.     Vascular:  Future Appointments 8/9/2024 - 2/5/2025        Date Visit Type Length Department Provider     8/26/2024 12:30 PM POST-OP 30 min [facility] VASCULAR SURGERY Yvette Alvarez APRN CNP    Location Instructions:     The [facility] ([facility] is in a dense urban area with multiple transportation and parking options. You may wish to review options for  service and self-parking in more detail on the [facility] website at [email or web address]/[email or web address].&nbsp;                9/11/2024  2:40 PM CTA CHEST ABDOMEN PELVIS W 20 min [facility] CT [id number]    Location Instructions:     The [facility] ([facility] is in a dense urban area with multiple transportation and parking options. You may wish to review options for  service and self-parking in more detail on the [facility] website at [email or web address]/[facility].               9/11/2024  3:30 PM POST-OP 30 min [facility] VASCULAR SURGERY Shine Sprague MD    Location Instructions:     The [facility] ([facility] is in a dense urban area with multiple transportation and parking options. You may wish to review options for  service and self-parking in more detail on the [facility] website at [email or web address].&nbsp;         With general vascular surgery questions, concerns, or to request/change an appointment, please call:      Vascular Call Center  [phone]    To contact someone after 5 pm, on a weekend, or on a Holiday, please call:  [facility]  [phone], option 4 to have the Attending Physician for Vascular Surgery Service paged.

## 2024-08-09 NOTE — PROGRESS NOTES
Care Management Discharge Note    Discharge Date: 08/09/2024       Discharge Disposition: Home    Discharge Services: Home Care    Discharge DME: None    Discharge Transportation: family or friend will provide    Private pay costs discussed: Not applicable    Does the patient's insurance plan have a 3 day qualifying hospital stay waiver?  No    PAS Confirmation Code:  N/A  Patient/family educated on Medicare website which has current facility and service quality ratings:  N/A    Education Provided on the Discharge Plan:  Yes  Persons Notified of Discharge Plans: Pt, Provider, Bedside RN  Patient/Family in Agreement with the Plan:  Yes    Handoff Referral Completed: Yes    Additional Information:  Per Provider, Pt medically ready for discharge. Per Provider request, Writer requested [facility] RN visit Pt 2-3 times per week at discharge to monitor BP as new BP meds were started. Per [facility] Liaison, they will be able to accommodate. Pt will have discharge transportation from his [facility]. No other discharge needs or concerns.     Bee Quiñonez RNCC  Covering for 6C (7210-1941 & [id number])  Phone [phone]  Pager [phone]    RN Care Coordinator     Social Work and Care Management Department      SEARCHABLE in [facility] - search CARE COORDINATOR       [place] ([phone]) Saturday & Sunday; ([phone]) [facility] Recognized Holidays     Units: 5A Onc Vocera & 5C Vocera Pager: [phone]    Units: 6B Vocera & 6C Vocera  Pager: [phone]    Units: 7A SOT RNCC Vocera, 7B Med Surg Vocera, & 7C Med Surg Vocera  Pager: [phone]    Units: 6A Vocera & 4A CVICU Vocera, 4C MICU Vocera, and 4E SICU Vocera   Pager: [phone]    Units: 5 Ortho Vocera & 5 Med Surg Vocera  Pager: [phone]    Units: 6 Med Surg Vocera & 8 Med Surg Vocera  Pager [phone]

## 2024-08-10 NOTE — PLAN OF CARE
Physical Therapy Discharge Summary    Reason for therapy discharge:    Discharged to home with home therapy.    Progress towards therapy goal(s). See goals on Care Plan in [facility] electronic health record for goal details.  Goals partially met.  Barriers to achieving goals:   discharge from facility.    Therapy recommendation(s):    Continued therapy is recommended.  Rationale/Recommendations:  to maximize functional safety and IND.

## 2024-08-11 ENCOUNTER — TELEPHONE (OUTPATIENT)
Dept: NURSING | Facility: CLINIC | Age: 74
End: 2024-08-11
Payer: COMMERCIAL

## 2024-08-11 NOTE — TELEPHONE ENCOUNTER
Telephone call  [facility] is calling for verbal orders for Skilled nursing visits once a week for 9 weeks.  Office number is  [phone]'  routing to provider  Fatimah Alvarez RN   [facility] Nurse Advisor  4:46 PM 8/11/2024

## 2024-08-12 ENCOUNTER — PATIENT OUTREACH (OUTPATIENT)
Dept: CARE COORDINATION | Facility: CLINIC | Age: 74
End: 2024-08-12
Payer: COMMERCIAL

## 2024-08-12 ENCOUNTER — PATIENT OUTREACH (OUTPATIENT)
Dept: FAMILY MEDICINE | Facility: CLINIC | Age: 74
End: 2024-08-12
Payer: COMMERCIAL

## 2024-08-12 ENCOUNTER — TELEPHONE (OUTPATIENT)
Dept: VASCULAR SURGERY | Facility: CLINIC | Age: 74
End: 2024-08-12
Payer: COMMERCIAL

## 2024-08-12 NOTE — PROGRESS NOTES
Contact   Chart Review     Situation: Patient chart reviewed by .    Background: Recently discharged from hospital. TCM created.      Assessment: Clinic RN has called patient earlier today.     Plan/Recommendations: No outreach will be made.      Mala Kolb,   [facility]  [phone]

## 2024-08-12 NOTE — TELEPHONE ENCOUNTER
"Called and spoke with Trevor barclay/donal PACHECO. He reports he is doing \"pretty good\". Reports some generalized \"achiness\", particularly around his ribs. He states this is from the hospital beds and from \"laying on wires\" during his recent hospital stay. He is taking oxycodone 2.5mg \"a few\" times per day - difficult to get exact dosing information from pt. He is taking tylenol 1000mg TID as well. He feels this is effective.     He does report appetite is not quite to baseline - he has eaten some meals but does not feel he is at his normal PO intake yet. He is voiding large amounts and is making sure to stay well-hydrated.    He reports he is ambulating w/o difficulty and performing ADL's. HHC has been ordered for him but first visit has not yet been arranged per pt.    I did contact his daugher, Rylee, to see if she had any additional concerns. LVM w/ callback. Provided clinic phone number to pt should he have any concerns.    NURA Sanchez, RN  RN Care Coordinator  [facility] Vascular Surgery - [place]  Clinic phone: [phone]  Fax: [phone]       "

## 2024-08-15 ENCOUNTER — TELEPHONE (OUTPATIENT)
Dept: FAMILY MEDICINE | Facility: CLINIC | Age: 74
End: 2024-08-15

## 2024-08-15 NOTE — TELEPHONE ENCOUNTER
August 15, 2024    Home health orders was received via fax for Dr. Crouch.  Patient label was attached to paperwork and placed in provider's inbox to be signed.    Karen Smith

## 2024-08-23 ENCOUNTER — OFFICE VISIT (OUTPATIENT)
Dept: NEUROLOGY | Facility: CLINIC | Age: 74
End: 2024-08-23
Payer: COMMERCIAL

## 2024-08-23 ENCOUNTER — PRE VISIT (OUTPATIENT)
Dept: NEUROLOGY | Facility: CLINIC | Age: 74
End: 2024-08-23

## 2024-08-23 VITALS
SYSTOLIC BLOOD PRESSURE: 86 MMHG | OXYGEN SATURATION: 98 % | RESPIRATION RATE: 16 BRPM | DIASTOLIC BLOOD PRESSURE: 64 MMHG | HEART RATE: 80 BPM

## 2024-08-23 DIAGNOSIS — R41.3 MEMORY LOSS: Primary | ICD-10-CM

## 2024-08-23 DIAGNOSIS — G40.909 NONINTRACTABLE EPILEPSY WITHOUT STATUS EPILEPTICUS, UNSPECIFIED EPILEPSY TYPE (H): ICD-10-CM

## 2024-08-23 DIAGNOSIS — E43 UNSPECIFIED SEVERE PROTEIN-CALORIE MALNUTRITION (H): ICD-10-CM

## 2024-08-23 DIAGNOSIS — G40.909 SEIZURE DISORDER (H): ICD-10-CM

## 2024-08-23 PROCEDURE — 99205 OFFICE O/P NEW HI 60 MIN: CPT | Performed by: INTERNAL MEDICINE

## 2024-08-23 PROCEDURE — 99417 PROLNG OP E/M EACH 15 MIN: CPT | Performed by: INTERNAL MEDICINE

## 2024-08-23 RX ORDER — LEVETIRACETAM 1000 MG/1
1000 TABLET ORAL EVERY EVENING
Qty: 90 TABLET | Refills: 0 | Status: SHIPPED | OUTPATIENT
Start: 2024-08-23

## 2024-08-23 RX ORDER — LEVETIRACETAM 750 MG/1
750 TABLET ORAL EVERY MORNING
Qty: 30 TABLET | Refills: 1 | Status: SHIPPED | OUTPATIENT
Start: 2024-08-23

## 2024-08-23 ASSESSMENT — PAIN SCALES - GENERAL: PAINLEVEL: NO PAIN (0)
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
## 2024-08-26 ENCOUNTER — LAB (OUTPATIENT)
Dept: LAB | Facility: CLINIC | Age: 74
End: 2024-08-26
Attending: INTERNAL MEDICINE
Payer: COMMERCIAL

## 2024-08-26 ENCOUNTER — OFFICE VISIT (OUTPATIENT)
Dept: VASCULAR SURGERY | Facility: CLINIC | Age: 74
End: 2024-08-26
Payer: COMMERCIAL

## 2024-08-26 VITALS — OXYGEN SATURATION: 97 % | SYSTOLIC BLOOD PRESSURE: 108 MMHG | HEART RATE: 87 BPM | DIASTOLIC BLOOD PRESSURE: 74 MMHG

## 2024-08-26 DIAGNOSIS — R41.3 MEMORY LOSS: ICD-10-CM

## 2024-08-26 DIAGNOSIS — G40.909 SEIZURE DISORDER (H): ICD-10-CM

## 2024-08-26 DIAGNOSIS — E43 UNSPECIFIED SEVERE PROTEIN-CALORIE MALNUTRITION (H): ICD-10-CM

## 2024-08-26 DIAGNOSIS — Z98.890 POSTOPERATIVE STATE: Primary | ICD-10-CM

## 2024-08-26 LAB
LEVETIRACETAM SERPL-MCNC: 33.7 ΜG/ML (ref 10–40)
TSH SERPL DL<=0.005 MIU/L-ACNC: 1.69 UIU/ML (ref 0.3–4.2)
VIT D+METAB SERPL-MCNC: 43 NG/ML (ref 20–50)

## 2024-08-26 PROCEDURE — 99000 SPECIMEN HANDLING OFFICE-LAB: CPT | Performed by: PATHOLOGY

## 2024-08-26 PROCEDURE — 84443 ASSAY THYROID STIM HORMONE: CPT | Performed by: PATHOLOGY

## 2024-08-26 PROCEDURE — 99213 OFFICE O/P EST LOW 20 MIN: CPT | Performed by: NURSE PRACTITIONER

## 2024-08-26 PROCEDURE — 80177 DRUG SCRN QUAN LEVETIRACETAM: CPT | Performed by: INTERNAL MEDICINE

## 2024-08-26 PROCEDURE — 36415 COLL VENOUS BLD VENIPUNCTURE: CPT | Performed by: PATHOLOGY

## 2024-08-26 PROCEDURE — 82306 VITAMIN D 25 HYDROXY: CPT | Performed by: INTERNAL MEDICINE

## 2024-08-26 ASSESSMENT — PAIN SCALES - GENERAL: PAINLEVEL: NO PAIN (0)

## 2024-08-26 NOTE — LETTER
8/26/2024       RE: Trevor Larios  [address]  [facility] [address]     Dear Colleague,    Thank you for referring your patient, Trevor Larios, to the [facility]ASCULAR [facility] [place] at [facility]. Please see a copy of my visit note below.        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 Wounds have healed, palpable femoral pulses bilaterally, no abdominal or new back pain, recovering well at home.     Recommend continue to monitor groin for new changes, smoking cessation. Patient has follow up with Dr. Sprague on 9/11/24.     Information/Education: patient able to teach back. Patient agreeable to plan of care: yes. All questions were answered and support provided. He has our contact information and knows to reach out with any additional questions or concerns.     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 The patient underwent a PMEG endovascular repair of aortic and celiac  "pseudoaneurysm with 4 vessel physician modified fenestrated-branched endograft with fenestrations for the celiac, superior mesenteric, and bilateral renal arteries on 08/06/24 with Dr. Sprague from which recovered well. Denies numbness or tingling in lower extremities, has palpable pulses and no abdominal or new back pain, report normal appetite, no nausea or vomiting.     REVIEW OF SYSTEMS:    A 12 point ROS was reviewed and is negative except for what is listed above in HPI.    PHH:    Past Medical History:   Diagnosis Date     Anemia      Celiac artery aneurysm (H24)      Coronary artery calcification      Crohn's disease (H)      Dementia (H) 2023    \"moderate.\"     Seizure disorder (H)           Past Surgical History:   Procedure Laterality Date     BACKBENCH PREPARATION, GRAFT, FENESTRATED N/A 8/6/2024    Procedure: with physician modified endograft.;  Surgeon: Shine Sprague MD;  Location: [facility] OR     COLONOSCOPY       ENDOVASCULAR REPAIR ANEURYSM THORACIC AORTIC N/A 8/6/2024    Procedure: 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  Stenting of the celiac with 10x38 icast, SMA with 6x22 icast, right renal artery with 6x22 icast, left renal artery wiht 6x22 icast. Cone beam CT, rotational DSA, ultrasound guided bi     IR OR ANGIOGRAM  8/6/2024     ORIF HIP FRACTURE  2022     SHOULDER SURGERY Right     2022?     TOE SURGERY         ALLERGIES:  Sulfasalazine    MEDS:    Current Outpatient Medications:      acetaminophen (TYLENOL) 500 MG tablet, Take 2 tablets (1,000 mg) by mouth every 8 hours, Disp: , Rfl:      aspirin (ASA) 81 MG chewable tablet, Take 1 tablet (81 mg) by mouth every evening, Disp: 90 tablet, Rfl: 0     calcium carbonate (TUMS) 500 MG chewable tablet, Take 1 chew tab by mouth 2 times daily as needed for heartburn, Disp: , " Rfl:      cyanocobalamin (VITAMIN B-12) 500 MCG SUBL sublingual tablet, Place 2 tablets (1,000 mcg) under the tongue daily, Disp: 180 tablet, Rfl: 0     ferrous sulfate (FEROSUL) 325 (65 Fe) MG tablet, Take 1 tablet (325 mg) by mouth daily, Disp: 30 tablet, Rfl: 0     levETIRAcetam (KEPPRA) 1000 MG tablet, Take 1 tablet (1,000 mg) by mouth every evening., Disp: 90 tablet, Rfl: 0     levETIRAcetam (KEPPRA) 750 MG tablet, Take 1 tablet (750 mg) by mouth every morning., Disp: 30 tablet, Rfl: 1     midodrine (PROAMATINE) 5 MG tablet, Take 2 tablets (10 mg) by mouth 3 times daily, Disp: 270 tablet, Rfl: 11     multivitamin w/minerals (THERA-VIT-M) tablet, Take 1 tablet by mouth daily, Disp: 30 tablet, Rfl: 0     thiamine (B-1) 100 MG tablet, Take 1 tablet (100 mg) by mouth daily, Disp: 90 tablet, Rfl: 3     vitamin C (ASCORBIC ACID) 500 MG tablet, Take 1 tablet (500 mg) by mouth daily, Disp: 90 tablet, Rfl: 1     Vitamin D, Cholecalciferol, 25 MCG (1000 UT) CAPS, Take 1,000 mcg by mouth daily, Disp: 90 capsule, Rfl: 3     loperamide (IMODIUM A-D) 2 MG tablet, Take 1 tablet (2 mg) by mouth 4 times daily as needed for diarrhea (Patient not taking: Reported on 8/12/2024), Disp: 20 tablet, Rfl: 2    SOCIAL HABITS:    History   Smoking Status     Former     Types: Cigarettes   Smokeless Tobacco     Never     Social History    Substance and Sexual Activity      Alcohol use: Not Currently      History   Drug Use Unknown       FAMILY HISTORY:    Family History   Problem Relation Age of Onset     Anesthesia Reaction No family hx of      Venous thrombosis No family hx of        PE:  /74 (BP Location: Right arm, Patient Position: Chair, Cuff Size: Adult Small)   Pulse 87   SpO2 97%   Wt Readings from Last 1 Encounters:   08/08/24 145 lb 1 oz     There is no height or weight on file to calculate BMI.    EXAM:  General: No apparent distress. Answers questions appropriately.   Neurologic: Focally intact, Alert and oriented x  3.   Eyes: Grossly normal.   Cardiac: RRR  Pulmonary: Non labored breathing with normal respiratory effort  Abdominal: Soft, non distended, non tender to palpation. No pulsatile mass.   Musculoskeletal/Extremity: palpable femoral pulses, palpable Dps bilaterally, 5/5 gross bilateral upper and lower extremity strength. no edema.  No open wounds or abrasions. Groin incisions have healed.     LABS:      Sodium   Date Value Ref Range Status   08/09/2024 139 135 - 145 mmol/L Final   08/08/2024 140 135 - 145 mmol/L Final   08/07/2024 140 135 - 145 mmol/L Final     Urea Nitrogen   Date Value Ref Range Status   08/09/2024 10.5 8.0 - 23.0 mg/dL Final   08/08/2024 11.0 8.0 - 23.0 mg/dL Final   08/07/2024 11.9 8.0 - 23.0 mg/dL Final     Hemoglobin   Date Value Ref Range Status   08/09/2024 10.2 (L) 13.3 - 17.7 g/dL Final   08/08/2024 9.5 (L) 13.3 - 17.7 g/dL Final   08/07/2024 9.5 (L) 13.3 - 17.7 g/dL Final     Platelet Count   Date Value Ref Range Status   08/09/2024 195 150 - 450 10e3/uL Final   08/08/2024 176 150 - 450 10e3/uL Final   08/07/2024 182 150 - 450 10e3/uL Final     INR   Date Value Ref Range Status   08/09/2024 1.18 (H) 0.85 - 1.15 Final   08/08/2024 1.15 0.85 - 1.15 Final   08/07/2024 1.19 (H) 0.85 - 1.15 Final         Again, thank you for allowing me to participate in the care of your patient.      Sincerely,    YEIMY León CNP

## 2024-08-26 NOTE — NURSING NOTE
Chief Complaint   Patient presents with    Follow Up     POST-OP - TEVAR        Vitals were taken, medications reconciled.    James Linder, Clinic Assistant   12:23 PM

## 2024-08-26 NOTE — PROGRESS NOTES
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 Wounds have healed, palpable femoral pulses bilaterally, no abdominal or new back pain, recovering well at home.     Recommend continue to monitor groin for new changes, smoking cessation. Patient has follow up with Dr. Sprague on 9/11/24.     Information/Education: patient able to teach back. Patient agreeable to plan of care: yes. All questions were answered and support provided. He has our contact information and knows to reach out with any additional questions or concerns.     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 The patient underwent a PMEG endovascular repair of aortic and celiac pseudoaneurysm with 4 vessel physician modified fenestrated-branched endograft with fenestrations for the celiac, superior mesenteric, and bilateral renal arteries on 08/06/24 with Dr. Sprague from which recovered well. Denies numbness or tingling in lower extremities, has palpable pulses and no abdominal or  "new back pain, report normal appetite, no nausea or vomiting.     REVIEW OF SYSTEMS:    A 12 point ROS was reviewed and is negative except for what is listed above in HPI.    PHH:    Past Medical History:   Diagnosis Date    Anemia     Celiac artery aneurysm (H24)     Coronary artery calcification     Crohn's disease (H)     Dementia (H) 2023    \"moderate.\"    Seizure disorder (H)           Past Surgical History:   Procedure Laterality Date    BACKBENCH PREPARATION, GRAFT, FENESTRATED N/A 8/6/2024    Procedure: with physician modified endograft.;  Surgeon: Shine Sprague MD;  Location: [facility] OR    COLONOSCOPY      ENDOVASCULAR REPAIR ANEURYSM THORACIC AORTIC N/A 8/6/2024    Procedure: 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  Stenting of the celiac with 10x38 icast, SMA with 6x22 icast, right renal artery with 6x22 icast, left renal artery wiht 6x22 icast. Cone beam CT, rotational DSA, ultrasound guided bi    IR OR ANGIOGRAM  8/6/2024    ORIF HIP FRACTURE  2022    SHOULDER SURGERY Right     2022?    TOE SURGERY         ALLERGIES:  Sulfasalazine    MEDS:    Current Outpatient Medications:     acetaminophen (TYLENOL) 500 MG tablet, Take 2 tablets (1,000 mg) by mouth every 8 hours, Disp: , Rfl:     aspirin (ASA) 81 MG chewable tablet, Take 1 tablet (81 mg) by mouth every evening, Disp: 90 tablet, Rfl: 0    calcium carbonate (TUMS) 500 MG chewable tablet, Take 1 chew tab by mouth 2 times daily as needed for heartburn, Disp: , Rfl:     cyanocobalamin (VITAMIN B-12) 500 MCG SUBL sublingual tablet, Place 2 tablets (1,000 mcg) under the tongue daily, Disp: 180 tablet, Rfl: 0    ferrous sulfate (FEROSUL) 325 (65 Fe) MG tablet, Take 1 tablet (325 mg) by mouth daily, Disp: 30 tablet, Rfl: 0    levETIRAcetam (KEPPRA) 1000 MG tablet, Take 1 tablet (1,000 mg) by " mouth every evening., Disp: 90 tablet, Rfl: 0    levETIRAcetam (KEPPRA) 750 MG tablet, Take 1 tablet (750 mg) by mouth every morning., Disp: 30 tablet, Rfl: 1    midodrine (PROAMATINE) 5 MG tablet, Take 2 tablets (10 mg) by mouth 3 times daily, Disp: 270 tablet, Rfl: 11    multivitamin w/minerals (THERA-VIT-M) tablet, Take 1 tablet by mouth daily, Disp: 30 tablet, Rfl: 0    thiamine (B-1) 100 MG tablet, Take 1 tablet (100 mg) by mouth daily, Disp: 90 tablet, Rfl: 3    vitamin C (ASCORBIC ACID) 500 MG tablet, Take 1 tablet (500 mg) by mouth daily, Disp: 90 tablet, Rfl: 1    Vitamin D, Cholecalciferol, 25 MCG (1000 UT) CAPS, Take 1,000 mcg by mouth daily, Disp: 90 capsule, Rfl: 3    loperamide (IMODIUM A-D) 2 MG tablet, Take 1 tablet (2 mg) by mouth 4 times daily as needed for diarrhea (Patient not taking: Reported on 8/12/2024), Disp: 20 tablet, Rfl: 2    SOCIAL HABITS:    History   Smoking Status    Former    Types: Cigarettes   Smokeless Tobacco    Never     Social History    Substance and Sexual Activity      Alcohol use: Not Currently      History   Drug Use Unknown       FAMILY HISTORY:    Family History   Problem Relation Age of Onset    Anesthesia Reaction No family hx of     Venous thrombosis No family hx of        PE:  /74 (BP Location: Right arm, Patient Position: Chair, Cuff Size: Adult Small)   Pulse 87   SpO2 97%   Wt Readings from Last 1 Encounters:   08/08/24 145 lb 1 oz     There is no height or weight on file to calculate BMI.    EXAM:  General: No apparent distress. Answers questions appropriately.   Neurologic: Focally intact, Alert and oriented x 3.   Eyes: Grossly normal.   Cardiac: RRR  Pulmonary: Non labored breathing with normal respiratory effort  Abdominal: Soft, non distended, non tender to palpation. No pulsatile mass.   Musculoskeletal/Extremity: palpable femoral pulses, palpable Dps bilaterally, 5/5 gross bilateral upper and lower extremity strength. no edema.  No open wounds or  abrasions. Groin incisions have healed.     LABS:      Sodium   Date Value Ref Range Status   08/09/2024 139 135 - 145 mmol/L Final   08/08/2024 140 135 - 145 mmol/L Final   08/07/2024 140 135 - 145 mmol/L Final     Urea Nitrogen   Date Value Ref Range Status   08/09/2024 10.5 8.0 - 23.0 mg/dL Final   08/08/2024 11.0 8.0 - 23.0 mg/dL Final   08/07/2024 11.9 8.0 - 23.0 mg/dL Final     Hemoglobin   Date Value Ref Range Status   08/09/2024 10.2 (L) 13.3 - 17.7 g/dL Final   08/08/2024 9.5 (L) 13.3 - 17.7 g/dL Final   08/07/2024 9.5 (L) 13.3 - 17.7 g/dL Final     Platelet Count   Date Value Ref Range Status   08/09/2024 195 150 - 450 10e3/uL Final   08/08/2024 176 150 - 450 10e3/uL Final   08/07/2024 182 150 - 450 10e3/uL Final     INR   Date Value Ref Range Status   08/09/2024 1.18 (H) 0.85 - 1.15 Final   08/08/2024 1.15 0.85 - 1.15 Final   08/07/2024 1.19 (H) 0.85 - 1.15 Final

## 2024-08-26 NOTE — TELEPHONE ENCOUNTER
August 26, 2024    Home health orders was picked up from outbox of Dr. Crouch and sent via fax to [phone].    Karen Smith

## 2024-08-27 NOTE — PATIENT INSTRUCTIONS
Thank you so much for choosing us for your care. It was a pleasure to see you at the vascular clinic today.     Follow-up recommendations:     Recommend continue to monitor groin for new changes, smoking cessation. Patient has follow up with Dr. Sprague on 9/11/24.        If you have any questions, please contact our clinic directly at [phone] and ask for the nurse. We also encourage the use of Collaborative [employer] Technology to communicate with your HealthCare Provider.        If you have an urgent need after business hours (8:00 am to 4:30 pm) please call [phone], option 4, and ask for the vascular attending on call. For non-urgent after hours needs, please call the vascular nurse at [phone] and leave a detailed voicemail. For scheduling needs, please call our clinic directly at [phone].

## 2024-09-04 ENCOUNTER — TELEPHONE (OUTPATIENT)
Dept: FAMILY MEDICINE | Facility: CLINIC | Age: 74
End: 2024-09-04
Payer: COMMERCIAL

## 2024-09-04 NOTE — TELEPHONE ENCOUNTER
Patient contacting clinic stating he received a message from an unsaved number that a nurse wants to see him tomorrow.     Reviewed appointment desk and informed of upcoming appointment with PCP. Requested this be changed to video visit due to transportation issues. Changed per patient request.      No notes indicating visit needed tomorrow. Advised patient should contact daughter directly and not respond to message to clarify what this may be regarding.       Patient verbalized understanding and had no further questions.

## 2024-09-04 NOTE — TELEPHONE ENCOUNTER
September 4, 2024    Home health orders was received via fax for Dr. Crouch.  Patient label was attached to paperwork and placed in provider's inbox to be signed.    Karen Smith

## 2024-09-05 ENCOUNTER — TELEPHONE (OUTPATIENT)
Dept: FAMILY MEDICINE | Facility: CLINIC | Age: 74
End: 2024-09-05
Payer: COMMERCIAL

## 2024-09-05 NOTE — TELEPHONE ENCOUNTER
September 5, 2024    Home health orders was received via fax for Dr. Crouch.  Patient label was attached to paperwork and placed in provider's inbox to be signed.    Karen Smith

## 2024-09-06 ENCOUNTER — VIRTUAL VISIT (OUTPATIENT)
Dept: FAMILY MEDICINE | Facility: CLINIC | Age: 74
End: 2024-09-06
Payer: COMMERCIAL

## 2024-09-06 ENCOUNTER — TELEPHONE (OUTPATIENT)
Dept: VASCULAR SURGERY | Facility: CLINIC | Age: 74
End: 2024-09-06

## 2024-09-06 DIAGNOSIS — I72.8 CELIAC ARTERY ANEURYSM (H): ICD-10-CM

## 2024-09-06 DIAGNOSIS — R29.6 FALLS FREQUENTLY: ICD-10-CM

## 2024-09-06 DIAGNOSIS — R41.89 COGNITIVE DECLINE: Primary | ICD-10-CM

## 2024-09-06 PROBLEM — J18.9 PNEUMONIA: Status: RESOLVED | Noted: 2024-04-11 | Resolved: 2024-09-06

## 2024-09-06 PROBLEM — S01.81XA LACERATION OF FOREHEAD, INITIAL ENCOUNTER: Status: RESOLVED | Noted: 2024-04-24 | Resolved: 2024-09-06

## 2024-09-06 PROBLEM — S61.211A LACERATION OF LEFT INDEX FINGER WITHOUT FOREIGN BODY WITHOUT DAMAGE TO NAIL, INITIAL ENCOUNTER: Status: RESOLVED | Noted: 2024-04-24 | Resolved: 2024-09-06

## 2024-09-06 PROBLEM — R06.02 SHORTNESS OF BREATH: Status: RESOLVED | Noted: 2024-04-07 | Resolved: 2024-09-06

## 2024-09-06 PROBLEM — R62.7 FAILURE TO THRIVE IN ADULT: Status: RESOLVED | Noted: 2024-04-24 | Resolved: 2024-09-06

## 2024-09-06 PROBLEM — R65.10 SIRS (SYSTEMIC INFLAMMATORY RESPONSE SYNDROME) (H): Status: RESOLVED | Noted: 2024-04-07 | Resolved: 2024-09-06

## 2024-09-06 PROCEDURE — G2211 COMPLEX E/M VISIT ADD ON: HCPCS | Mod: 95 | Performed by: FAMILY MEDICINE

## 2024-09-06 PROCEDURE — 99214 OFFICE O/P EST MOD 30 MIN: CPT | Mod: 95 | Performed by: FAMILY MEDICINE

## 2024-09-06 NOTE — PROGRESS NOTES
"Trevor is a 74 year old who is being evaluated via a billable video visit.    How would you like to obtain your AVS? Ирина  If the video visit is dropped, the invitation should be resent by: Text to cell phone: [phone]  Will anyone else be joining your video visit? No      Assessment & Plan     Cognitive decline  The patient was insistent that cannabis would improve his memory. He states that he is no longer at risk of falling and the nurse did a memory test and told him his was fine.   I explained that it is known because of scientific studies that daily cannabis makes memory worse.   He did not seem to remember going to the neurologist. I explained that they are doing additional testing because of concerns about his function.  He claimed that he did not need help in the home. I asked why he still had visiting nurses.  Several times, he told me, \"You must be talking to Terese\" (his daughter.) I explained that I merely read the medical reports on the chart. \"She wants me to go to something like a nursing home.\"  He reports that he talks to his mother who  when he was 6 yo. And she talks back.  He told me if I would not arrange from him to get medical marijuana, then he would find another physician. He asked me to refer him to someone who would prescribe it. I explained that it would be unethical for me to refer him since it is contraindicated in his situation.    Falls frequently  He is not using his cane. He doesn't like how it looks.  He denies that he is at risk of falling now.    Crohn's  I offered to treat his Crohn's with non-cannabis medications. He declines that and states it is not really a problem that needs pills. In fact, though it is on his chart from [place], he consistently denied any symptoms of Crohn's.    Aneurysms repaired  Doing well.    He wrote down his upcoming appointment with the surgeon when I told when it was.  He states he won't come see me because of the refusal to arrange for him " to have cannabis. I explained that was his choice.  He still has a Medicare Wellness visit scheduled for October with me.    MED REC REQUIRED  Post Medication Reconciliation Status:       Stef Murray is a 74 year old, presenting for the following health issues:  Hospital F/U (Patient had surgery 8/6/2024. 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 Pt would like to discuss Medical Marijuana.)      9/6/2024    10:09 AM   Additional Questions   Roomed by stella morgan   Accompanied by self     The surgery went well. He is doing 20 pushups, 20 squats. Takes walks (just went to [facility] today, and able the post office.)  Has a rubber band from PT.   Scars are healed.   Wanting medical marijuana. He would like to mix that with the tobacco when he rolls his cigarettes. The visiting nurse told him it was legal.  He was wanting it for Crohn's and abdominal issues. He feels it would help him to get up. He insists that it will help with his memory.   His daughter wants to have him go to assisted living. He doesn't feel like he can talk to her.   He notes he prays the rosary every day.          Objective    Vitals - Patient Reported  Systolic (Patient Reported): 109  Diastolic (Patient Reported): 64  Physical Exam   GENERAL: alert and no distress  EYES: Eyes grossly normal to inspection.  No discharge or erythema, or obvious scleral/conjunctival abnormalities.  RESP: No audible wheeze, cough, or visible cyanosis.    SKIN: Visible skin clear. No significant rash, abnormal pigmentation or lesions.  NEURO: Cranial nerves grossly intact.  Mentation and speech appropriate for age.  PSYCH: Appropriate affect, tone, and pace of words    Labs and records from the hospitalization were reviewed.      Video-Visit Details    Type of service:   Video Visit 10:35 to 10:53  18 minutes.  Originating Location (pt. Location): Home    Distant Location (provider location):  On-site  Platform used for Video Visit: Mushtaq  Signed Electronically by: CATHY FLORES MD

## 2024-09-06 NOTE — TELEPHONE ENCOUNTER
Returned pt's call. He is wondering about his visit on 9/11 and states he may have transport issues. I asked if his daughter can take him to this appt. Pt was not sure. Explained this visit is an imperative part of his post-op follow-up after having aortic repair. Pt stated he is getting another call and had to go.    I will call him next week.    NURA Sanchez, RN  RN Care Coordinator  [facility]ascular Surgery [facility] phone: [phone]  Fax: [phone]

## 2024-09-06 NOTE — TELEPHONE ENCOUNTER
[facility] Call Center    Phone Message    May a detailed message be left on voicemail: yes     Reason for Call: Other: Please call patient to discuss changes in his med regimen including medical marijuana. Aware of upcoming appointment next week. Okay to leave a VM     Action Taken: Message routed to:  [facility] IR/VASC    Travel Screening: Not Applicable

## 2024-09-08 ENCOUNTER — MYC REFILL (OUTPATIENT)
Dept: NEUROLOGY | Facility: CLINIC | Age: 74
End: 2024-09-08
Payer: COMMERCIAL

## 2024-09-08 ENCOUNTER — MYC REFILL (OUTPATIENT)
Dept: FAMILY MEDICINE | Facility: CLINIC | Age: 74
End: 2024-09-08
Payer: COMMERCIAL

## 2024-09-08 DIAGNOSIS — R63.6 UNDERWEIGHT: ICD-10-CM

## 2024-09-08 DIAGNOSIS — G40.909 SEIZURE DISORDER (H): ICD-10-CM

## 2024-09-09 RX ORDER — MIDODRINE HYDROCHLORIDE 5 MG/1
10 TABLET ORAL 3 TIMES DAILY
Qty: 270 TABLET | Refills: 11 | OUTPATIENT
Start: 2024-09-09

## 2024-09-09 RX ORDER — ASCORBIC ACID 500 MG
500 TABLET ORAL DAILY
Qty: 90 TABLET | Refills: 1 | Status: SHIPPED | OUTPATIENT
Start: 2024-09-09

## 2024-09-09 NOTE — TELEPHONE ENCOUNTER
September 9, 2024    Home health orders was picked up from outbox of Dr. Crouch and sent via fax to [phone].    Karen Smith

## 2024-09-09 NOTE — TELEPHONE ENCOUNTER
September 9, 2024    Home health orders was picked up from outbox of Dr. Crouch and sent via fax to [phone].    Karen Smith

## 2024-09-11 ENCOUNTER — ANCILLARY PROCEDURE (OUTPATIENT)
Dept: CT IMAGING | Facility: CLINIC | Age: 74
End: 2024-09-11
Attending: SURGERY
Payer: COMMERCIAL

## 2024-09-11 ENCOUNTER — OFFICE VISIT (OUTPATIENT)
Dept: VASCULAR SURGERY | Facility: CLINIC | Age: 74
End: 2024-09-11
Attending: SURGERY
Payer: COMMERCIAL

## 2024-09-11 VITALS — SYSTOLIC BLOOD PRESSURE: 116 MMHG | OXYGEN SATURATION: 98 % | HEART RATE: 67 BPM | DIASTOLIC BLOOD PRESSURE: 72 MMHG

## 2024-09-11 DIAGNOSIS — I71.40 AAA (ABDOMINAL AORTIC ANEURYSM) (H): ICD-10-CM

## 2024-09-11 DIAGNOSIS — G40.909 SEIZURE DISORDER (H): ICD-10-CM

## 2024-09-11 DIAGNOSIS — I72.8 CELIAC ARTERY ANEURYSM (H): Primary | ICD-10-CM

## 2024-09-11 DIAGNOSIS — I71.20 ANEURYSM OF THORACIC AORTA (H): Primary | ICD-10-CM

## 2024-09-11 DIAGNOSIS — R62.7 FAILURE TO THRIVE IN ADULT: ICD-10-CM

## 2024-09-11 LAB
CREAT BLD-MCNC: 1.1 MG/DL (ref 0.7–1.3)
EGFRCR SERPLBLD CKD-EPI 2021: >60 ML/MIN/1.73M2

## 2024-09-11 PROCEDURE — 82565 ASSAY OF CREATININE: CPT | Performed by: PATHOLOGY

## 2024-09-11 PROCEDURE — 74174 CTA ABD&PLVS W/CONTRAST: CPT | Performed by: RADIOLOGY

## 2024-09-11 PROCEDURE — 99213 OFFICE O/P EST LOW 20 MIN: CPT | Mod: GC | Performed by: SURGERY

## 2024-09-11 PROCEDURE — 71275 CT ANGIOGRAPHY CHEST: CPT | Performed by: RADIOLOGY

## 2024-09-11 RX ORDER — IOPAMIDOL 755 MG/ML
125 INJECTION, SOLUTION INTRAVASCULAR ONCE
Status: COMPLETED | OUTPATIENT
Start: 2024-09-11 | End: 2024-09-11

## 2024-09-11 RX ADMIN — IOPAMIDOL 125 ML: 755 INJECTION, SOLUTION INTRAVASCULAR at 14:48

## 2024-09-11 ASSESSMENT — PAIN SCALES - GENERAL: PAINLEVEL: NO PAIN (0)

## 2024-09-11 NOTE — PATIENT INSTRUCTIONS
Thank you so much for choosing us for your care. It was a pleasure to see you at the vascular clinic today.     Follow-up recommendations: We will see you back in 5 months. Please do not hesitate to reach out to us in the meantime with any concerns. Our schedulers will get in touch with you to set up your follow-up visit.    Additional testing/imaging ordered today: CTA in 5 months      Our scheduling team will get in touch with you to set up any follow-up testing/imaging and/or appointments. Please be aware that any testing/imaging recommended today will need to completed prior to your next visit with the provider. If testing/imaging is not completed prior to your next visit, your visit may be rescheduled.     If you have any questions, please contact our clinic directly at [phone] and ask for the nurse. We also encourage the use of Oony to communicate with your healthcare provider.    If you have an urgent need after business hours (8:00 am to 4:30 pm) please call [phone], option 4, and ask for the vascular attending on call. For non-urgent after hours needs, please call the vascular clinic at [phone]. For scheduling needs, please call our clinic directly at [phone].

## 2024-09-11 NOTE — Clinical Note
"9/11/2024       RE: Trevor Larios  [address]  [place] [facility] [address]     Dear Colleague,    Thank you for referring your patient, Trevor Larios, to the [facility] [place] at [facility]. Please see a copy of my visit note below.    Vascular & Endovascular Surgery Clinic Return Visit   Vascular Surgeon: Shine Sprague MD, RPVI      Location:  [facility]    Trevor Larios  Medical Record #:  [id number]  YOB: 1950  Age:  73 year old     Date of Service: 09/11/24     Primary Care Provider: Lyly Crouch    Chief Complaint/Reason for Visit: Post-op follow up s/p FEVAR for celiac/aortic pseudoaneurysm    Interval History: The patient is doing well post-op. He is staying with his granddaughter who drove him to clinic today. No complaints. Denies any new or worsening chest/abdominal/back pain, palpitations, dizziness/lightheadedness, SOB, F/C, N/V, vision changes, facial or extremity weakness/paresthesias, or focal neurologic deficits. Denies claudication, ischemic rest pain, or nonhealing wounds on the lower extremities. Denies postprandial abdominal pain or unintended weight loss.     Review of Systems:    A 12 point ROS was reviewed and is negative except for symptoms noted in HPI.     Medical/Surgical History:    Past Medical History:   Diagnosis Date    Anemia     Celiac artery aneurysm (H24)     Coronary artery calcification     Crohn's disease (H)     Dementia (H) 2023    \"moderate.\"    Seizure disorder (H)          Past Surgical History:   Procedure Laterality Date    BACKBENCH PREPARATION, GRAFT, FENESTRATED N/A 8/6/2024    Procedure: with physician modified endograft.;  Surgeon: Shine Sprague MD;  Location: [facility] OR    COLONOSCOPY      ENDOVASCULAR REPAIR ANEURYSM THORACIC AORTIC N/A 8/6/2024    Procedure: Endovascular repair of aortic and celiac pseudoaneurysm with 4 " vessel physician modified fenestrated-branched endograft with fenestrations for the celiac, superior mesenteric, and bilateral renal arteries.  Stenting of the celiac with 10x38 icast, SMA with 6x22 icast, right renal artery with 6x22 icast, left renal artery wiht 6x22 icast. Cone beam CT, rotational DSA, ultrasound guided bi    IR OR ANGIOGRAM  8/6/2024    ORIF HIP FRACTURE  2022    SHOULDER SURGERY Right     2022?    TOE SURGERY          Allergies:     Allergies   Allergen Reactions    Sulfasalazine Fatigue and Headache        Medications:    Current Outpatient Medications   Medication Sig Dispense Refill    acetaminophen (TYLENOL) 500 MG tablet Take 2 tablets (1,000 mg) by mouth every 8 hours      aspirin (ASA) 81 MG chewable tablet Take 1 tablet (81 mg) by mouth every evening 90 tablet 0    calcium carbonate (TUMS) 500 MG chewable tablet Take 1 chew tab by mouth 2 times daily as needed for heartburn      cyanocobalamin (VITAMIN B-12) 500 MCG SUBL sublingual tablet Place 2 tablets (1,000 mcg) under the tongue daily 180 tablet 0    ferrous sulfate (FEROSUL) 325 (65 Fe) MG tablet Take 1 tablet (325 mg) by mouth daily 30 tablet 0    levETIRAcetam (KEPPRA) 1000 MG tablet Take 1 tablet (1,000 mg) by mouth every evening. 90 tablet 0    levETIRAcetam (KEPPRA) 750 MG tablet Take 1 tablet (750 mg) by mouth every morning. 30 tablet 1    loperamide (IMODIUM A-D) 2 MG tablet Take 1 tablet (2 mg) by mouth 4 times daily as needed for diarrhea 20 tablet 2    midodrine (PROAMATINE) 5 MG tablet Take 2 tablets (10 mg) by mouth 3 times daily 270 tablet 11    multivitamin w/minerals (THERA-VIT-M) tablet Take 1 tablet by mouth daily 30 tablet 0    thiamine (B-1) 100 MG tablet Take 1 tablet (100 mg) by mouth daily 90 tablet 3    vitamin C (ASCORBIC ACID) 500 MG tablet Take 1 tablet (500 mg) by mouth daily. 90 tablet 1    Vitamin D, Cholecalciferol, 25 MCG (1000 UT) CAPS Take 1,000 mcg by mouth daily 90 capsule 3     No current  facility-administered medications for this visit.        Social Habits:    Tobacco Use      Smoking status: Former        Packs/day: 0.00        Types: Cigarettes        Quit date:         Years since quittin.6      Smokeless tobacco: Never       Alcohol Use: Not on file       History   Drug Use Unknown        Family History:    Family History   Problem Relation Age of Onset    Anesthesia Reaction No family hx of     Venous thrombosis No family hx of        Physical Examination:  There were no vitals taken for this visit.  Wt Readings from Last 1 Encounters:   24 65.8 kg (145 lb 1 oz)     There is no height or weight on file to calculate BMI.    Exam:   General: No apparent distress. Answers questions appropriately   Neurologic: Focally intact, Alert and oriented x 3.   Eyes: Grossly normal.   Cardiac: Regular rate and rhythm, palpable femorals bilaterally, palpable DP and PT pulses bilaterally  Pulmonary:  Non labored breathing with normal respiratory effort  Abdominal: Soft, non distended, non tender to palpation.  No pulsatile mass.   Groins: soft bilaterally, no hematomas. Access sites well-healed.   Musculoskeletal/Extremity: 5/5 gross lower extremity strength. No edema. No open wounds or abrasions.      Vascular Exam:  Radial: Left: 2+   Right: 2+    Femoral: Left: 2+   Right: 2+    PT:   Left: 2+   Right: 2+    Laboratory Findings:  Hemoglobin   Date Value Ref Range Status   2024 10.2 (L) 13.3 - 17.7 g/dL Final   2024 9.5 (L) 13.3 - 17.7 g/dL Final     WBC Count   Date Value Ref Range Status   2024 8.4 4.0 - 11.0 10e3/uL Final   2024 7.8 4.0 - 11.0 10e3/uL Final     Platelet Count   Date Value Ref Range Status   2024 195 150 - 450 10e3/uL Final   2024 176 150 - 450 10e3/uL Final     Creatinine   Date Value Ref Range Status   2024 0.74 0.67 - 1.17 mg/dL Final     Creatinine POCT   Date Value Ref Range Status   2024 1.1 0.7 - 1.3 mg/dL Final      Sodium   Date Value Ref Range Status   08/09/2024 139 135 - 145 mmol/L Final     Glucose   Date Value Ref Range Status   08/09/2024 95 70 - 99 mg/dL Final   08/08/2024 105 (H) 70 - 99 mg/dL Final     GLUCOSE BY METER POCT   Date Value Ref Range Status   08/07/2024 80 70 - 99 mg/dL Final   08/07/2024 98 70 - 99 mg/dL Final     INR   Date Value Ref Range Status   08/09/2024 1.18 (H) 0.85 - 1.15 Final     Imaging:  I personally reviewed the CTA C/A/P from 09/11/24 which shows that the fenestrated graft is patent with good exclusion of the aneurysm and no apparent endoleak.The four visceral stents are widely patent with excellent flow to end-organs.     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 Diff infection, now treated  #9- Unspecified dementia with mild to moderate cognitive impairment (MOCA 14-21/30)  #10- Multiple visits the emergency room    Mr. Larios is a 73 year old male with a history of celiac and aortic pseudoaneurysm that showed no evidence of infection on PET CT. He did well post-operatively and continues to do well. Imaging demonstrates a patent repair.     Recommendations/Plan:  Continue ASA, statin  RTC 6 months post-op with repeat CTA C/A/P      Gisele Valentino MD  PGY-6  Vascular Surgery  Pager: #[phone]       Again, thank you for allowing me to participate in the care of your patient.      Sincerely,    Shine Sprague MD

## 2024-09-11 NOTE — DISCHARGE INSTRUCTIONS

## 2024-09-11 NOTE — PROGRESS NOTES
"Vascular & Endovascular Surgery Clinic Return Visit   Vascular Surgeon: Shine Sprague MD, RPVI      Location:  [facility] [place]    Trevor Larios  Medical Record #:  [id number]  YOB: 1950  Age:  73 year old     Date of Service: 09/11/24     Primary Care Provider: Lyly Crouch    Chief Complaint/Reason for Visit: Post-op follow up s/p FEVAR for celiac/aortic pseudoaneurysm    Interval History: The patient is doing well post-op. He is staying with his granddaughter who drove him to clinic today. No complaints. Denies any new or worsening chest/abdominal/back pain, palpitations, dizziness/lightheadedness, SOB, F/C, N/V, vision changes, facial or extremity weakness/paresthesias, or focal neurologic deficits. Denies claudication, ischemic rest pain, or nonhealing wounds on the lower extremities. Denies postprandial abdominal pain or unintended weight loss.     Review of Systems:    A 12 point ROS was reviewed and is negative except for symptoms noted in HPI.     Medical/Surgical History:    Past Medical History:   Diagnosis Date    Anemia     Celiac artery aneurysm (H24)     Coronary artery calcification     Crohn's disease (H)     Dementia (H) 2023    \"moderate.\"    Seizure disorder (H)          Past Surgical History:   Procedure Laterality Date    BACKBENCH PREPARATION, GRAFT, FENESTRATED N/A 8/6/2024    Procedure: with physician modified endograft.;  Surgeon: Shine Sprague MD;  Location: [facility] OR    COLONOSCOPY      ENDOVASCULAR REPAIR ANEURYSM THORACIC AORTIC N/A 8/6/2024    Procedure: 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  Stenting of the celiac with 10x38 icast, SMA with 6x22 icast, right renal artery with 6x22 icast, left renal artery wiht 6x22 icast. Cone beam CT, rotational DSA, " ultrasound guided bi    IR OR ANGIOGRAM  2024    ORIF HIP FRACTURE      SHOULDER SURGERY Right     ?    TOE SURGERY          Allergies:     Allergies   Allergen Reactions    Sulfasalazine Fatigue and Headache        Medications:    Current Outpatient Medications   Medication Sig Dispense Refill    acetaminophen (TYLENOL) 500 MG tablet Take 2 tablets (1,000 mg) by mouth every 8 hours      aspirin (ASA) 81 MG chewable tablet Take 1 tablet (81 mg) by mouth every evening 90 tablet 0    calcium carbonate (TUMS) 500 MG chewable tablet Take 1 chew tab by mouth 2 times daily as needed for heartburn      cyanocobalamin (VITAMIN B-12) 500 MCG SUBL sublingual tablet Place 2 tablets (1,000 mcg) under the tongue daily 180 tablet 0    ferrous sulfate (FEROSUL) 325 (65 Fe) MG tablet Take 1 tablet (325 mg) by mouth daily 30 tablet 0    levETIRAcetam (KEPPRA) 1000 MG tablet Take 1 tablet (1,000 mg) by mouth every evening. 90 tablet 0    levETIRAcetam (KEPPRA) 750 MG tablet Take 1 tablet (750 mg) by mouth every morning. 30 tablet 1    loperamide (IMODIUM A-D) 2 MG tablet Take 1 tablet (2 mg) by mouth 4 times daily as needed for diarrhea 20 tablet 2    midodrine (PROAMATINE) 5 MG tablet Take 2 tablets (10 mg) by mouth 3 times daily 270 tablet 11    multivitamin w/minerals (THERA-VIT-M) tablet Take 1 tablet by mouth daily 30 tablet 0    thiamine (B-1) 100 MG tablet Take 1 tablet (100 mg) by mouth daily 90 tablet 3    vitamin C (ASCORBIC ACID) 500 MG tablet Take 1 tablet (500 mg) by mouth daily. 90 tablet 1    Vitamin D, Cholecalciferol, 25 MCG (1000 UT) CAPS Take 1,000 mcg by mouth daily 90 capsule 3     No current facility-administered medications for this visit.        Social Habits:    Tobacco Use      Smoking status: Former        Packs/day: 0.00        Types: Cigarettes        Quit date:         Years since quittin.6      Smokeless tobacco: Never       Alcohol Use: Not on file       History   Drug Use Unknown         Family History:    Family History   Problem Relation Age of Onset    Anesthesia Reaction No family hx of     Venous thrombosis No family hx of        Physical Examination:  There were no vitals taken for this visit.  Wt Readings from Last 1 Encounters:   08/08/24 65.8 kg (145 lb 1 oz)     There is no height or weight on file to calculate BMI.    Exam:   General: No apparent distress. Answers questions appropriately   Neurologic: Focally intact, Alert and oriented x 3.   Eyes: Grossly normal.   Cardiac: Regular rate and rhythm, palpable femorals bilaterally, palpable DP and PT pulses bilaterally  Pulmonary:  Non labored breathing with normal respiratory effort  Abdominal: Soft, non distended, non tender to palpation.  No pulsatile mass.   Groins: soft bilaterally, no hematomas. Access sites well-healed.   Musculoskeletal/Extremity: 5/5 gross lower extremity strength. No edema. No open wounds or abrasions.      Vascular Exam:  Radial: Left: 2+   Right: 2+    Femoral: Left: 2+   Right: 2+    PT:   Left: 2+   Right: 2+    Laboratory Findings:  Hemoglobin   Date Value Ref Range Status   08/09/2024 10.2 (L) 13.3 - 17.7 g/dL Final   08/08/2024 9.5 (L) 13.3 - 17.7 g/dL Final     WBC Count   Date Value Ref Range Status   08/09/2024 8.4 4.0 - 11.0 10e3/uL Final   08/08/2024 7.8 4.0 - 11.0 10e3/uL Final     Platelet Count   Date Value Ref Range Status   08/09/2024 195 150 - 450 10e3/uL Final   08/08/2024 176 150 - 450 10e3/uL Final     Creatinine   Date Value Ref Range Status   08/09/2024 0.74 0.67 - 1.17 mg/dL Final     Creatinine POCT   Date Value Ref Range Status   09/11/2024 1.1 0.7 - 1.3 mg/dL Final     Sodium   Date Value Ref Range Status   08/09/2024 139 135 - 145 mmol/L Final     Glucose   Date Value Ref Range Status   08/09/2024 95 70 - 99 mg/dL Final   08/08/2024 105 (H) 70 - 99 mg/dL Final     GLUCOSE BY METER POCT   Date Value Ref Range Status   08/07/2024 80 70 - 99 mg/dL Final   08/07/2024 98 70 - 99 mg/dL  Final     INR   Date Value Ref Range Status   08/09/2024 1.18 (H) 0.85 - 1.15 Final     Imaging:  I personally reviewed the CTA C/A/P from 09/11/24 which shows that the fenestrated graft is patent with good exclusion of the aneurysm and no apparent endoleak.The four visceral stents are widely patent with excellent flow to end-organs.     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 Diff infection, now treated  #9- Unspecified dementia with mild to moderate cognitive impairment (MOCA 14-21/30)  #10- Multiple visits the emergency room    Mr. Larios is a 73 year old male with a history of celiac and aortic pseudoaneurysm that showed no evidence of infection on PET CT. He did well post-operatively and continues to do well. Imaging demonstrates a patent repair.     Recommendations/Plan:  Continue ASA, statin  RTC 6 months post-op with repeat CTA C/A/P      Gisele Valentino MD  PGY-6  Vascular Surgery  Pager: #[phone]

## 2024-09-12 ENCOUNTER — TELEPHONE (OUTPATIENT)
Dept: FAMILY MEDICINE | Facility: CLINIC | Age: 74
End: 2024-09-12
Payer: COMMERCIAL

## 2024-09-12 ENCOUNTER — MEDICAL CORRESPONDENCE (OUTPATIENT)
Dept: HEALTH INFORMATION MANAGEMENT | Facility: CLINIC | Age: 74
End: 2024-09-12

## 2024-09-12 NOTE — TELEPHONE ENCOUNTER
Estelita with [facility] calling for verbal order below and with patient update for PCP.       Home Care is calling regarding an established patient with  [facility].        4/22/2024    10:42 AM   Home Care Information   Current following provider Willa   [place] [facility]     Requesting orders from: Lyly Crouch  Provider is following patient: Yes  Is this a 60-day recertification request?  No    Orders Requested    Skilled Nursing  Request for initial certification (first set of orders)   Frequency:  1x a week every other week for 8 weeks       Information was gathered and will be sent to provider for review.  RN will contact Home Care with information after provider review.  Confirmed ok to leave a detailed message with call back.  Contact information confirmed and updated as needed.    Kendal Kennedy, RN     Call back- Estelita at [phone] (ok leave message)     Patient update-      - Weight down 3 lbs in 1 month. Normal/starting weight is 144 lbs   - Ortho static /74 and BP at standing 118/68- no sx

## 2024-09-13 NOTE — TELEPHONE ENCOUNTER
Sandie Monreal, APRN CNP  You22 hours ago (12:37 PM)     SB  I am the covering provider for Dr. Crouch- I agree with plan - please give verbal order.      Thank you    Sandie Monreal, CNP

## 2024-09-16 RX ORDER — LEVETIRACETAM 1000 MG/1
1000 TABLET ORAL EVERY EVENING
Qty: 90 TABLET | Refills: 0 | OUTPATIENT
Start: 2024-09-16

## 2024-09-16 RX ORDER — LEVETIRACETAM 750 MG/1
750 TABLET ORAL EVERY MORNING
Qty: 30 TABLET | Refills: 1 | OUTPATIENT
Start: 2024-09-16

## 2024-09-23 ENCOUNTER — TELEPHONE (OUTPATIENT)
Dept: FAMILY MEDICINE | Facility: CLINIC | Age: 74
End: 2024-09-23

## 2024-09-23 ENCOUNTER — OFFICE VISIT (OUTPATIENT)
Dept: FAMILY MEDICINE | Facility: CLINIC | Age: 74
End: 2024-09-23
Payer: COMMERCIAL

## 2024-09-23 VITALS
DIASTOLIC BLOOD PRESSURE: 65 MMHG | HEART RATE: 79 BPM | RESPIRATION RATE: 11 BRPM | BODY MASS INDEX: 21.59 KG/M2 | HEIGHT: 69 IN | OXYGEN SATURATION: 97 % | TEMPERATURE: 98.3 F | WEIGHT: 145.8 LBS | SYSTOLIC BLOOD PRESSURE: 120 MMHG

## 2024-09-23 DIAGNOSIS — R41.89 COGNITIVE DECLINE: ICD-10-CM

## 2024-09-23 DIAGNOSIS — Z29.11 NEED FOR VACCINATION AGAINST RESPIRATORY SYNCYTIAL VIRUS: ICD-10-CM

## 2024-09-23 DIAGNOSIS — Z91.148 NONCOMPLIANCE WITH MEDICATION REGIMEN: ICD-10-CM

## 2024-09-23 DIAGNOSIS — R53.1 WEAKNESS: ICD-10-CM

## 2024-09-23 DIAGNOSIS — Z00.00 MEDICARE ANNUAL WELLNESS VISIT, SUBSEQUENT: Primary | ICD-10-CM

## 2024-09-23 DIAGNOSIS — Z23 NEED FOR SHINGLES VACCINE: ICD-10-CM

## 2024-09-23 DIAGNOSIS — R29.6 FALLS FREQUENTLY: ICD-10-CM

## 2024-09-23 DIAGNOSIS — I72.8 CELIAC ARTERY ANEURYSM (H): ICD-10-CM

## 2024-09-23 PROCEDURE — G0439 PPPS, SUBSEQ VISIT: HCPCS | Performed by: FAMILY MEDICINE

## 2024-09-23 PROCEDURE — 90677 PCV20 VACCINE IM: CPT | Performed by: FAMILY MEDICINE

## 2024-09-23 PROCEDURE — G0008 ADMIN INFLUENZA VIRUS VAC: HCPCS | Performed by: FAMILY MEDICINE

## 2024-09-23 PROCEDURE — 90480 ADMN SARSCOV2 VAC 1/ONLY CMP: CPT | Performed by: FAMILY MEDICINE

## 2024-09-23 PROCEDURE — 90662 IIV NO PRSV INCREASED AG IM: CPT | Performed by: FAMILY MEDICINE

## 2024-09-23 PROCEDURE — 91320 SARSCV2 VAC 30MCG TRS-SUC IM: CPT | Performed by: FAMILY MEDICINE

## 2024-09-23 PROCEDURE — G0009 ADMIN PNEUMOCOCCAL VACCINE: HCPCS | Performed by: FAMILY MEDICINE

## 2024-09-23 ASSESSMENT — PAIN SCALES - GENERAL: PAINLEVEL: NO PAIN (0)

## 2024-09-23 NOTE — PROGRESS NOTES
Preventive Care Visit  [facility]  CATHY FLORES MD, Family Medicine  Sep 23, 2024      Assessment & Plan     Medicare annual wellness visit, subsequent  He does not use any opioids or controlled substances and has no prescriptions for them. PDMP reviewed and he received a few for his aneurysm procedures.    Cognitive decline  We have worked this up.  He wants to try to help it on his own with mental and physical exercise.  He has a neurologist.    Weakness  Resolved    Noncompliance with medication regimen  This would be a problem if others did not monitor and lay out his medications in medsets.  His home healthcare may wrap up soon. His granddaughter acts as his patient care assistant and can manage his medications. He was hoping she could be paid for her services, but Medicare does not pay for that.    Falls frequently  He is much better now.  He was underweight and weak on arrival to [place]. His weight is now normal and he is exercising.    Celiac artery aneurysm (H24)  Repaired    Need for vaccination against respiratory syncytial virus  - RSV vaccine, bivalent, ABRYSVO, injection  Dispense: 0.5 mL; Refill: 0    Need for shingles vaccine  - zoster vaccine recombinant adjuvanted (SHINGRIX) injection  Dispense: 0.5 mL; Refill: 0  Counseling  Appropriate preventive services were addressed with this patient via screening, questionnaire, or discussion as appropriate for fall prevention, nutrition, physical activity, Tobacco-use cessation, social engagement, weight loss and cognition.  Checklist reviewing preventive services available has been given to the patient.  Reviewed patient's diet, addressing concerns and/or questions.   The patient was instructed to see the dentist every 6 months.   Updated plan of care.  Patient reported difficulty with activities of daily living were addressed today.    Stef Murray is a 74 year old, presenting for the following:  Physical        9/23/2024      9:50 AM   Additional Questions   Roomed by gissel mansfield         Health Care Directive  Patient does not have a Health Care Directive or Living Will: Discussed advance care planning with patient; information given to patient to review.    He reports that he has quit all drugs and alcohol.  Doing 20 squats and 20 pushups. Walks to [place]. Uses the stairs at his apartment. He does not use the cane anymore and feels stable.   He ran a marathon in the past.  Visiting nurses help with medications. He has graduated from physical therapy. Occupational therapy has reduced frequency. He recognizes that he forgets things, such as when he is reading. He wants to work on his memory and knows he is failing the tests.          9/23/2024   General Health   How would you rate your overall physical health? Excellent   Feel stress (tense, anxious, or unable to sleep) Not at all          9/23/2024   Nutrition   Diet: Regular (no restrictions)          9/23/2024   Exercise   Days per week of moderate/strenous exercise 4 days   Average minutes spent exercising at this level 30 min          9/23/2024   Social Factors   Frequency of gathering with friends or relatives More than three times a week   Worry food won't last until get money to buy more No   Food not last or not have enough money for food? No   Do you have housing? (Housing is defined as stable permanent housing and does not include staying ouside in a car, in a tent, in an abandoned building, in an overnight shelter, or couch-surfing.) Yes   Are you worried about losing your housing? No   Lack of transportation? No   Unable to get utilities (heat,electricity)? No      He fell the day I met him <1 year ago.      9/23/2024   Fall Risk   Fallen 2 or more times in the past year? No   Trouble with walking or balance? No             9/23/2024   Activities of Daily Living- Home Safety   Needs help with the following daily activites Transportation    Preparing meals    Medication  "administration   Safety concerns in the home None of the above       Multiple values from one day are sorted in reverse-chronological order         2024   Dental   Dentist two times every year? (!) NO          2024   Hearing Screening   Hearing concerns? None of the above          2024   Driving Risk Screening   Patient/family members have concerns about driving No          2024   General Alertness/Fatigue Screening   Have you been more tired than usual lately? No          2024   Urinary Incontinence Screening   Bothered by leaking urine in past 6 months No          2024   TB Screening   Were you born outside of the [place]? No      Today's PHQ-2 Score:       2024    10:00 AM   PHQ-2 (  [employer])   PHQ-2 Score Incomplete         2024   Substance Use   Alcohol more than 3/day or more than 7/wk No   Do you have a current opioid prescription? No   How severe/bad is pain from 1 to 10? 3/10   Do you use any other substances recreationally? No      Social History     Tobacco Use    Smoking status: Former     Current packs/day: 0.00     Types: Cigarettes     Quit date:      Years since quittin.7    Smokeless tobacco: Never   Vaping Use    Vaping status: Never Used   Substance Use Topics    Alcohol use: Not Currently    Drug use: Not Currently       ASCVD Risk   The ASCVD Risk score (Maury GERONIMO, et al., 2019) failed to calculate for the following reasons:    The patient has a prior MI or stroke diagnosis      Reviewed and updated as needed this visit by Provider                  Past Medical History:   Diagnosis Date    Anemia     Celiac artery aneurysm (H24)     Coronary artery calcification     Crohn's disease (H)     Dementia (H)     \"moderate.\"    Seizure disorder (H)      Past Surgical History:   Procedure Laterality Date    BACKBENCH PREPARATION, GRAFT, FENESTRATED N/A 2024    Procedure: with physician modified endograft.;  Surgeon: Shine Sprague, " MD;  Location: [facility] OR    COLONOSCOPY      ENDOVASCULAR REPAIR ANEURYSM THORACIC AORTIC N/A 8/6/2024    Procedure: 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  Stenting of the celiac with 10x38 icast, SMA with 6x22 icast, right renal artery with 6x22 icast, left renal artery wiht 6x22 icast. Cone beam CT, rotational DSA, ultrasound guided bi    IR OR ANGIOGRAM  8/6/2024    ORIF HIP FRACTURE  2022    SHOULDER SURGERY Right     2022?    TOE SURGERY       Current Outpatient Medications   Medication Sig Dispense Refill    acetaminophen (TYLENOL) 500 MG tablet Take 2 tablets (1,000 mg) by mouth every 8 hours      aspirin (ASA) 81 MG chewable tablet Take 1 tablet (81 mg) by mouth every evening 90 tablet 0    calcium carbonate (TUMS) 500 MG chewable tablet Take 1 chew tab by mouth 2 times daily as needed for heartburn      cyanocobalamin (VITAMIN B-12) 500 MCG SUBL sublingual tablet Place 2 tablets (1,000 mcg) under the tongue daily 180 tablet 0    ferrous sulfate (FEROSUL) 325 (65 Fe) MG tablet Take 1 tablet (325 mg) by mouth daily 30 tablet 0    levETIRAcetam (KEPPRA) 1000 MG tablet Take 1 tablet (1,000 mg) by mouth every evening. 90 tablet 0    levETIRAcetam (KEPPRA) 750 MG tablet Take 1 tablet (750 mg) by mouth every morning. 30 tablet 1    loperamide (IMODIUM A-D) 2 MG tablet Take 1 tablet (2 mg) by mouth 4 times daily as needed for diarrhea 20 tablet 2    midodrine (PROAMATINE) 5 MG tablet Take 2 tablets (10 mg) by mouth 3 times daily 270 tablet 11    multivitamin w/minerals (THERA-VIT-M) tablet Take 1 tablet by mouth daily 30 tablet 0    thiamine (B-1) 100 MG tablet Take 1 tablet (100 mg) by mouth daily 90 tablet 3    vitamin C (ASCORBIC ACID) 500 MG tablet Take 1 tablet (500 mg) by mouth daily. 90 tablet 1    Vitamin D, Cholecalciferol, 25 MCG (1000 UT) CAPS  "Take 1,000 mcg by mouth daily 90 capsule 3     Allergies   Allergen Reactions    Sulfasalazine Fatigue and Headache     Current providers sharing in care for this patient include:  Patient Care Team:  Lyly Crouch MD as PCP - General (Family Medicine)  Lyly Crouch MD as Assigned PCP  Shine Sprague MD as Assigned Heart and Vascular Provider  William Lim MD as Resident (Neurology)  Don Betancourt MD as MD (Neurology)  Edwige Luke OD (Optometry)  Heidi Campos PA-C as Physician Assistant (Anesthesiology)  Edwige Luke OD as Assigned Surgical Provider  Amita Peters, RN as Registered Nurse (Vascular Surgery)  Tavia Nair, PhD LP as Psychologist (Neuropsychology)  Francie Santiago MD as Physician (Neurology)    The following health maintenance items are reviewed in Epic and correct as of today:  Health Maintenance   Topic Date Due    Pneumococcal Vaccine: 65+ Years (1 of 2 - PCV) Never done    ZOSTER IMMUNIZATION (1 of 2) Never done    MEDICARE ANNUAL WELLNESS VISIT  Never done    RSV VACCINE (1 - 1-dose 75+ series) Never done    INFLUENZA VACCINE (1) Never done    COVID-19 Vaccine (1 - 2024-25 season) Never done    ANNUAL REVIEW OF  ORDERS  04/17/2025    LUNG CANCER SCREENING  09/11/2025    FALL RISK ASSESSMENT  09/23/2025    GLUCOSE  08/09/2027    ADVANCE CARE PLANNING  03/15/2029    LIPID  04/17/2029    DTAP/TDAP/TD IMMUNIZATION (2 - Td or Tdap) 04/24/2034    HEPATITIS C SCREENING  Completed    PHQ-2 (once per calendar year)  Completed    HPV IMMUNIZATION  Aged Out    MENINGITIS IMMUNIZATION  Aged Out    RSV MONOCLONAL ANTIBODY  Aged Out       Review of Systems  Constitutional, HEENT, cardiovascular, pulmonary, gi and gu systems are negative, except as otherwise noted.     Objective    Exam  /65 (BP Location: Left arm, Patient Position: Sitting, Cuff Size: Adult Regular)   Pulse 79   Temp 98.3  F (36.8  C)   Resp 11   Ht 1.753 m (5' 9\")   Wt 66.1 kg (145 " "lb 12.8 oz)   SpO2 97%   BMI 21.53 kg/m     Estimated body mass index is 21.53 kg/m  as calculated from the following:    Height as of this encounter: 1.753 m (5' 9\").    Weight as of this encounter: 66.1 kg (145 lb 12.8 oz).    Physical Exam  GENERAL: alert and no distress  EYES: Eyes grossly normal to inspection, PERRL and conjunctivae and sclerae normal  HENT: ear canals and TM's normal, nose and mouth without ulcers or lesions  NECK: no adenopathy, no asymmetry, masses, or scars  RESP: lungs clear to auscultation - no rales, rhonchi or wheezes  CV: regular rate and rhythm, normal S1 S2, no S3 or S4, no murmur, click or rub, no peripheral edema  ABDOMEN: soft, nontender, no hepatosplenomegaly, no masses and bowel sounds normal  MS: no gross musculoskeletal defects noted, no edema  SKIN: no suspicious lesions or rashes  NEURO: Normal strength and tone, mentation intact and speech normal  PSYCH: mentation appears normal, affect normal/bright        9/23/2024   Mini Cog   Clock Draw Score 2 Normal   3 Item Recall 1 object recalled   Mini Cog Total Score 3      Signed Electronically by: CATHY FLORES MD    "

## 2024-09-23 NOTE — TELEPHONE ENCOUNTER
September 23, 2024    Home health orders was received via fax for Dr. Crouch.  Patient label was attached to paperwork and placed in provider's inbox to be signed.    Karen Smith

## 2024-09-23 NOTE — NURSING NOTE
Prior to immunization administration, verified patients identity using patient s name and date of birth. Please see Immunization Activity for additional information.     Screening Questionnaire for Adult Immunization    Are you sick today?   No   Do you have allergies to medications, food, a vaccine component or latex?   No   Have you ever had a serious reaction after receiving a vaccination?   No   Do you have a long-term health problem with heart, lung, kidney, or metabolic disease (e.g., diabetes), asthma, a blood disorder, no spleen, complement component deficiency, a cochlear implant, or a spinal fluid leak?  Are you on long-term aspirin therapy?   No   Do you have cancer, leukemia, HIV/AIDS, or any other immune system problem?   No   Do you have a parent, brother, or sister with an immune system problem?   No   In the past 3 months, have you taken medications that affect  your immune system, such as prednisone, other steroids, or anticancer drugs; drugs for the treatment of rheumatoid arthritis, Crohn s disease, or psoriasis; or have you had radiation treatments?   No   Have you had a seizure, or a brain or other nervous system problem?   Yes   During the past year, have you received a transfusion of blood or blood    products, or been given immune (gamma) globulin or antiviral drug?   No   For women: Are you pregnant or is there a chance you could become       pregnant during the next month?   No   Have you received any vaccinations in the past 4 weeks?   No     Immunization questionnaire was positive for at least one answer.  MD aware      Pt tolerated injections (Covid 19 12+ Pfizer Comirnaty Vaccine, Influenza Vaccine 65+: Fluzone HD and Prevnar 20). Site was cleansed with alcohol prior to injections. No pain, burning, swelling or redness at the site of the injection. Patient instructed to remain in clinic for 15 minutes afterwards, and to report any adverse reactions    Screening performed by Anabel JOHNSON  Jerry Ramirez RN on 9/23/2024 at 11:44 AM.

## 2024-09-24 PROBLEM — Z91.148 NONCOMPLIANCE WITH MEDICATION REGIMEN: Status: RESOLVED | Noted: 2024-04-24 | Resolved: 2024-09-24

## 2024-09-24 PROBLEM — R53.1 WEAKNESS: Status: RESOLVED | Noted: 2024-04-07 | Resolved: 2024-09-24

## 2024-09-24 PROBLEM — R29.6 FALLS FREQUENTLY: Status: RESOLVED | Noted: 2024-04-07 | Resolved: 2024-09-24

## 2024-10-28 ENCOUNTER — TELEPHONE (OUTPATIENT)
Dept: FAMILY MEDICINE | Facility: CLINIC | Age: 74
End: 2024-10-28
Payer: COMMERCIAL

## 2024-10-28 NOTE — TELEPHONE ENCOUNTER
October 28, 2024    Home health orders was received via fax for Dr. Crouch.  Patient label was attached to paperwork and placed in provider's inbox to be signed.    Karen Smith

## 2024-10-31 NOTE — TELEPHONE ENCOUNTER
October 31, 2024    Home health orders was picked up from outbox of Dr. Crouch and sent via fax to [phone].    Karen Smith

## 2024-11-04 ENCOUNTER — TELEPHONE (OUTPATIENT)
Dept: FAMILY MEDICINE | Facility: CLINIC | Age: 74
End: 2024-11-04
Payer: COMMERCIAL

## 2024-11-04 NOTE — TELEPHONE ENCOUNTER
November 4, 2024    Home health orders was received via fax for Dr. Crouch.  Patient label was attached to paperwork and placed in provider's inbox to be signed.    Karen Smith

## 2024-11-07 ENCOUNTER — TELEPHONE (OUTPATIENT)
Dept: FAMILY MEDICINE | Facility: CLINIC | Age: 74
End: 2024-11-07
Payer: COMMERCIAL

## 2024-11-07 DIAGNOSIS — G40.909 SEIZURE DISORDER (H): ICD-10-CM

## 2024-11-07 NOTE — TELEPHONE ENCOUNTER
ENRICO Capone, from [facility], called into the clinic to discuss the patient's current medication list, on 11/7/24.    Writer and Estelita discussed the patient's current medications and the one difference in the patient's medication list with home care is that Estelita stated that the patient is currently taking 2000 mcg tablet once daily of cyanocobalamin, instead of the 1000 mcg cyanocobalamin listed in the patient's [facility] medication list.    Stated that his blood pressure was 138/78 mg/dl this morning    Needs refill of thiamine 100 mg, but Estelita stated that she is unsure what pharmacy the patient is currently using, so she will call back to the clinic with the patient's current preferred pharmacy to request a refill of the patient's thiamine    Estelita also stated that home care nursing is discharging the patient today, 11/7/24, and that behavioral health nursing will be meeting with the patient today.    Writer will route the above information to the PCP to review and advise next steps.    Tiff Baldwin RN, BSN  [facility]

## 2024-11-08 ENCOUNTER — TELEPHONE (OUTPATIENT)
Dept: FAMILY MEDICINE | Facility: CLINIC | Age: 74
End: 2024-11-08
Payer: COMMERCIAL

## 2024-11-08 ENCOUNTER — TELEPHONE (OUTPATIENT)
Dept: NEUROPSYCHOLOGY | Facility: CLINIC | Age: 74
End: 2024-11-08
Payer: COMMERCIAL

## 2024-11-08 NOTE — TELEPHONE ENCOUNTER
Left Voicemail (1st Attempt) and Sent Mychart (1st Attempt) for the patient to call back and reschedule the following:    Appointment type: Neuropsych Eval or Neurospych Interview Only   Provider: Any Neuropsychology Provider   Return date: First Available   Specialty phone number: [phone]  Additional appointment(s) needed: N/A  Additonal Notes: Reschedule Request- Provider schedule change    Schedule first available   Neuropsych Interview Only have sooner available appointments    Shayna Euceda on 11/8/2024 at 10:23 AM

## 2024-11-08 NOTE — TELEPHONE ENCOUNTER
November 8, 2024    Home health orders was received via fax for Dr. Crouch.  Patient label was attached to paperwork and placed in provider's inbox to be signed.    Shahnaz Waldrop

## 2024-11-08 NOTE — TELEPHONE ENCOUNTER
Spoke Estelita. Patient discharged and Estelita advised clinic call [facility].     Attempted to contact [facility]. No answer. LVM to return call.

## 2024-11-11 ENCOUNTER — MEDICAL CORRESPONDENCE (OUTPATIENT)
Dept: HEALTH INFORMATION MANAGEMENT | Facility: CLINIC | Age: 74
End: 2024-11-11

## 2024-11-11 NOTE — TELEPHONE ENCOUNTER
Rx Authorization:  Requested Medication/ Dose: Keppra 750 MG tabs  Date last refill ordered: 8/23/24  Quantity ordered: 30  # refills: 1  Date of last clinic visit with ordering provider: 8/23/24  Date of next clinic visit with ordering provider: 3/3/25  All pertinent protocol data (lab date/result):   Include pertinent information from patients message:    Was the patient seen in the last year in this department? Yes     Does patient have an active prescription for medications requested? No     Received Request Via: Pharmacy

## 2024-11-12 RX ORDER — LEVETIRACETAM 750 MG/1
TABLET ORAL
Qty: 30 TABLET | Refills: 1 | Status: SHIPPED | OUTPATIENT
Start: 2024-11-12

## 2024-11-12 NOTE — TELEPHONE ENCOUNTER
November 12, 2024    Home health orders was picked up from outbox of Dr. Crouch and sent via fax to [phone].    Shahnaz Waldrop

## 2024-11-12 NOTE — TELEPHONE ENCOUNTER
November 12, 2024    Home health orders was picked up from outbox of Dr. Crouch and sent via fax to [phone].    Shahnaz Waldrop

## 2024-11-14 ENCOUNTER — TELEPHONE (OUTPATIENT)
Dept: FAMILY MEDICINE | Facility: CLINIC | Age: 74
End: 2024-11-14
Payer: COMMERCIAL

## 2024-11-14 DIAGNOSIS — Z53.9 DIAGNOSIS NOT YET DEFINED: Primary | ICD-10-CM

## 2024-11-14 NOTE — TELEPHONE ENCOUNTER
November 14, 2024    Home health orders was received via fax for Dr. Crouch.  Patient label was attached to paperwork and placed in provider's inbox to be signed.    Karen Smith

## 2024-11-18 NOTE — TELEPHONE ENCOUNTER
November 18, 2024    Home health orders was picked up from outbox of Dr. Crouch and sent via fax to [phone].    Karen Smith

## 2024-12-03 ENCOUNTER — TELEPHONE (OUTPATIENT)
Dept: FAMILY MEDICINE | Facility: CLINIC | Age: 74
End: 2024-12-03
Payer: COMMERCIAL

## 2024-12-03 NOTE — TELEPHONE ENCOUNTER
December 3, 2024    Home health orders was received via fax for Dr. Crouch.  Patient label was attached to paperwork and placed in provider's inbox to be signed.    Shahnaz Waldrop

## 2024-12-07 DIAGNOSIS — G40.909 SEIZURE DISORDER (H): ICD-10-CM

## 2024-12-12 RX ORDER — LEVETIRACETAM 1000 MG/1
TABLET ORAL
Qty: 90 TABLET | Refills: 3 | Status: SHIPPED | OUTPATIENT
Start: 2024-12-12

## 2024-12-30 ENCOUNTER — TELEPHONE (OUTPATIENT)
Dept: FAMILY MEDICINE | Facility: CLINIC | Age: 74
End: 2024-12-30
Payer: COMMERCIAL

## 2024-12-30 NOTE — TELEPHONE ENCOUNTER
December 30, 2024    Home health orders was received via fax for Dr. Crouch.  Patient label was attached to paperwork and placed in provider's inbox to be signed.    Karen Smith

## 2025-01-10 ENCOUNTER — MEDICAL CORRESPONDENCE (OUTPATIENT)
Dept: HEALTH INFORMATION MANAGEMENT | Facility: CLINIC | Age: 75
End: 2025-01-10

## 2025-01-15 DIAGNOSIS — G40.909 SEIZURE DISORDER (H): ICD-10-CM

## 2025-01-20 ENCOUNTER — TELEPHONE (OUTPATIENT)
Dept: FAMILY MEDICINE | Facility: CLINIC | Age: 75
End: 2025-01-20
Payer: COMMERCIAL

## 2025-01-20 NOTE — TELEPHONE ENCOUNTER
January 20, 2025    Home health orders was received via fax for Dr. Crouch.  Patient label was attached to paperwork and placed in provider's inbox to be signed.    Karen Smith

## 2025-01-21 RX ORDER — LEVETIRACETAM 750 MG/1
750 TABLET ORAL EVERY MORNING
Qty: 90 TABLET | Refills: 1 | Status: SHIPPED | OUTPATIENT
Start: 2025-01-21

## 2025-01-21 NOTE — TELEPHONE ENCOUNTER
January 21, 2025    Patient is not homebound (at least was not last time seen in September)  Dr Lyly Crouch 01/21/2025    Karen Smith

## 2025-01-21 NOTE — TELEPHONE ENCOUNTER
levETIRAcetam (KEPPRA) 750 MG tablet   Last Written Prescription Date:  11/12/2024  Last Fill Quantity: 30,   # refills: 1  Last Office Visit : 8/3/2024  Future Office visit:  3/3/2025     levETIRAcetam (KEPPRA) 750 MG tablet: passed [facility] Neuro-Epilepsy medication protocol  - age >65 and Creatinine level completed within  the past 26 months  - refills sent       Creatinine   Date Value Ref Range Status   08/09/2024 0.74 0.67 - 1.17 mg/dL Final     Creatinine POCT   Date Value Ref Range Status   09/11/2024 1.1 0.7 - 1.3 mg/dL Final

## 2025-01-24 ENCOUNTER — TELEPHONE (OUTPATIENT)
Dept: FAMILY MEDICINE | Facility: CLINIC | Age: 75
End: 2025-01-24
Payer: COMMERCIAL

## 2025-01-24 NOTE — TELEPHONE ENCOUNTER
Ary with [facility] calls regarding patient's homebound status, as [facility] received form that patient is not home bound.     Per review of chart, telephone encounter 1/20/25, form resent to Dr. Crouch for signatures, as [facility] states patient is home bound.     Ary states patient does not drive. He relies on his daughter's if he needs to get anywhere.     Ary asks for return call if Dr. Crouch will refilling out forms and if patient is considered home bound.     Ary's callback number: [phone]

## 2025-01-28 ENCOUNTER — VIRTUAL VISIT (OUTPATIENT)
Dept: NEUROPSYCHOLOGY | Facility: CLINIC | Age: 75
End: 2025-01-28
Payer: COMMERCIAL

## 2025-01-28 DIAGNOSIS — R41.89 COGNITIVE DECLINE: ICD-10-CM

## 2025-01-28 DIAGNOSIS — G40.909 SEIZURE DISORDER (H): ICD-10-CM

## 2025-01-28 DIAGNOSIS — G31.84 MILD COGNITIVE IMPAIRMENT: Primary | ICD-10-CM

## 2025-01-28 PROCEDURE — 99207 PR PSYCHIATRIC DIAGNOSTIC EVALUATION: CPT | Mod: 95
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
## 2025-02-05 ENCOUNTER — TELEPHONE (OUTPATIENT)
Dept: NEUROPSYCHOLOGY | Facility: CLINIC | Age: 75
End: 2025-02-05
Payer: COMMERCIAL

## 2025-02-05 NOTE — TELEPHONE ENCOUNTER
Patient confirmed scheduled appointment:  Date: 2/21/2025  Time: 12:30pm  Visit type: Neuropsych Testing Only  Provider: Saima  Location: [place]  Testing/imaging: NA  Additional notes: BETTYE Elias on 2/5/2025 at 1:45 PM

## 2025-02-21 ENCOUNTER — TELEPHONE (OUTPATIENT)
Dept: FAMILY MEDICINE | Facility: CLINIC | Age: 75
End: 2025-02-21
Payer: COMMERCIAL

## 2025-02-21 NOTE — TELEPHONE ENCOUNTER
Home Care is calling regarding an established patient with [facility].  Requesting orders from: Lyly Crouch: RN able to provide verbal orders.  Sending as FYI only.  Is this a request for a temporary pause in the home care episode?  No    Orders Requested    Skilled Nursing  Calling to inform PCP that patient canceled visit this week, RN plans to see patient next week         Caller: Ary  Home Care Agency: [facility] RN  Phone number Home Care can be reached at: [phone]  Okay to leave a detailed message?: Yes    Fatimah Bunch RN

## 2025-03-03 ENCOUNTER — VIRTUAL VISIT (OUTPATIENT)
Dept: NEUROLOGY | Facility: CLINIC | Age: 75
End: 2025-03-03
Payer: COMMERCIAL

## 2025-03-03 ENCOUNTER — TELEPHONE (OUTPATIENT)
Dept: NEUROLOGY | Facility: CLINIC | Age: 75
End: 2025-03-03

## 2025-03-03 VITALS — HEIGHT: 69 IN | BODY MASS INDEX: 22.22 KG/M2 | WEIGHT: 150 LBS

## 2025-03-03 DIAGNOSIS — G40.909 SEIZURE DISORDER (H): Primary | ICD-10-CM

## 2025-03-03 ASSESSMENT — PAIN SCALES - GENERAL: PAINLEVEL_OUTOF10: NO PAIN (0)

## 2025-03-03 NOTE — NURSING NOTE
Current patient location: [address]  [address] [facility] [address]    Is the patient currently in the state of [place]? YES    Visit mode: VIDEO    If the visit is dropped, the patient can be reconnected by:VIDEO VISIT: Send to e-mail at: [email or web address]    Will anyone else be joining the visit? NO  (If patient encounters technical issues they should call [phone]    Are changes needed to the allergy or medication list? Pt declined med review    Are refills needed on medications prescribed by this physician? NO    Rooming Documentation:  Not applicable    Reason for visit: ROSANNA Sylvester VVF    **Pt states his daughter and granddaughter take care of medications for him. Pt was not able to review medications during rooming process.

## 2025-03-03 NOTE — PROGRESS NOTES
Virtual Visit Details    Type of service:  Video Visit   Video Start Time:  10:00  Video End Time: 10:18    Originating Location (pt. Location): Home    Distant Location (provider location):  Off-site  Platform used for Video Visit: [employer]    Mr. Larios found it difficult to join through video. I tried calling him via his preffered number but it went to voicemail. SO I left instructions for him to be rescheduled for an in person visit.    Francie Santiago

## 2025-03-03 NOTE — LETTER
3/3/2025       RE: Trevor Larios  [address]  [place] [facility] [address]     Dear Colleague,    Thank you for referring your patient, Trevor Larios, to the [facility]. Please see a copy of my visit note below.    Virtual Visit Details    Type of service:  Video Visit   Video Start Time:  10:00  Video End Time: 10:18    Originating Location (pt. Location): Home    Distant Location (provider location):  Off-site  Platform used for Video Visit: [facility]    Mr. Larios found it difficult to join through video. I tried calling him via his preffered number but it went to voicemail. SO I left instructions for him to be rescheduled for an in person visit.    Francie Santiago       Again, thank you for allowing me to participate in the care of your patient.      Sincerely,    Francie Santiago MD

## 2025-03-03 NOTE — TELEPHONE ENCOUNTER
Patient confirmed scheduled appointment:  Date: 3/4/25  Time: 10:30am  Visit type: Return Seizure  Provider: Jack  Location: [facility]  Testing/imaging: NA  Additional notes: reschedule 3/3/25 appt as he was unable to connect virtually.     Pt will call daughter and if appt does not work, she will call back reschedule his 3/4/25 appt.     Karey Thompson on 3/3/2025 at 2:58 PM

## 2025-03-03 NOTE — TELEPHONE ENCOUNTER
Hi,    Please reach out to this pt to get him rescheduled for an in person visit with Dr. Santiago. He was not able to connect to his video visit today.     Thanks!

## 2025-03-04 ENCOUNTER — OFFICE VISIT (OUTPATIENT)
Dept: NEUROLOGY | Facility: CLINIC | Age: 75
End: 2025-03-04
Payer: COMMERCIAL

## 2025-03-04 VITALS
RESPIRATION RATE: 16 BRPM | HEART RATE: 64 BPM | DIASTOLIC BLOOD PRESSURE: 84 MMHG | SYSTOLIC BLOOD PRESSURE: 141 MMHG | OXYGEN SATURATION: 98 %

## 2025-03-04 DIAGNOSIS — R41.3 MEMORY LOSS: Primary | ICD-10-CM

## 2025-03-04 ASSESSMENT — PAIN SCALES - GENERAL: PAINLEVEL_OUTOF10: NO PAIN (0)
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
## 2025-03-04 NOTE — NURSING NOTE
Chief Complaint   Patient presents with    RECHECK     Return Seizure       BP (!) 142/78 (BP Location: Right arm, Patient Position: Sitting, Cuff Size: Adult Regular)   Pulse 64   Resp 16   SpO2 98%       Bhumi Dick

## 2025-03-05 ENCOUNTER — TELEPHONE (OUTPATIENT)
Dept: NEUROLOGY | Facility: CLINIC | Age: 75
End: 2025-03-05
Payer: COMMERCIAL

## 2025-03-05 ENCOUNTER — TELEPHONE (OUTPATIENT)
Dept: FAMILY MEDICINE | Facility: CLINIC | Age: 75
End: 2025-03-05
Payer: COMMERCIAL

## 2025-03-05 NOTE — TELEPHONE ENCOUNTER
March 5, 2025    Home health orders was received via fax for Dr. Crouch.  Patient label was attached to paperwork and placed in provider's inbox to be signed.    Rafy Childs

## 2025-03-05 NOTE — TELEPHONE ENCOUNTER
Left Voicemail (1st Attempt) and Sent Mychart (1st Attempt) for the patient to call back and schedule the following:    Appointment type: New General Neurology   Provider: Any  Return date: Next avail  Specialty phone number: [phone]  Additional appointment(s) needed: NA  Additonal Notes:

## 2025-03-06 ENCOUNTER — TELEPHONE (OUTPATIENT)
Dept: FAMILY MEDICINE | Facility: CLINIC | Age: 75
End: 2025-03-06
Payer: COMMERCIAL

## 2025-03-06 NOTE — TELEPHONE ENCOUNTER
----- Message from CATHY CROUCH sent at 3/6/2025  8:58 AM CST -----  Regardin month follow-up  Hello.  It is time for his six month follow-up this month or early next. Please schedule.  Thanks.  Cathy Crouch MD

## 2025-03-10 ENCOUNTER — TELEPHONE (OUTPATIENT)
Dept: FAMILY MEDICINE | Facility: CLINIC | Age: 75
End: 2025-03-10
Payer: COMMERCIAL

## 2025-03-10 NOTE — TELEPHONE ENCOUNTER
March 10, 2025    Home health orders was received via fax for Dr. Crouch.  Patient label was attached to paperwork and placed in provider's inbox to be signed.    Karen Smith

## 2025-03-12 ENCOUNTER — TELEPHONE (OUTPATIENT)
Dept: VASCULAR SURGERY | Facility: CLINIC | Age: 75
End: 2025-03-12
Payer: COMMERCIAL

## 2025-03-12 NOTE — TELEPHONE ENCOUNTER
----- Message from Amita CORRALES sent at 3/12/2025  8:37 AM CDT -----  Good morning,    This patient was due to follow-up with Dr. Sprague 5 months from his last visit in September with a CTA prior. It looks like he has not been contacted.    Can you please contact the pt to schedule his follow-up? Please schedule at next available opening.    Thanks.    Amita

## 2025-03-12 NOTE — TELEPHONE ENCOUNTER
Patient Contacted I spoke with Terese the pts daughter for the patient to call back and schedule the followin Month follow up TEVAR.  Terese ask that I sent the pt a Mychart Mess    Location: [facility]  Provider: Dr. Shine Sprague  Appointment type: Return Vascular Patient  Appointment mode: In Person  Return date: Next Available     Specialty phone number: [phone] or [phone]    Referred to [facility]: No    Is Imaging Needed: Yes, CTA    Additional Notes: Please schedule imaging prior to Provider visit.    -Eyad Jim on 3/12/2025 at 10:44 AM

## 2025-03-16 ENCOUNTER — HEALTH MAINTENANCE LETTER (OUTPATIENT)
Age: 75
End: 2025-03-16

## 2025-04-06 ENCOUNTER — HOSPITAL ENCOUNTER (EMERGENCY)
Facility: CLINIC | Age: 75
Discharge: HOME | End: 2025-04-06
Attending: EMERGENCY MEDICINE | Admitting: EMERGENCY MEDICINE
Payer: COMMERCIAL

## 2025-04-06 VITALS
RESPIRATION RATE: 16 BRPM | TEMPERATURE: 98.3 F | SYSTOLIC BLOOD PRESSURE: 113 MMHG | DIASTOLIC BLOOD PRESSURE: 77 MMHG | OXYGEN SATURATION: 97 % | HEART RATE: 82 BPM

## 2025-04-06 DIAGNOSIS — R06.6 HICCUPS: ICD-10-CM

## 2025-04-06 LAB
ALBUMIN SERPL BCG-MCNC: 4.3 G/DL (ref 3.5–5.2)
ALP SERPL-CCNC: 78 U/L (ref 40–150)
ALT SERPL W P-5'-P-CCNC: 14 U/L (ref 0–70)
ANION GAP SERPL CALCULATED.3IONS-SCNC: 12 MMOL/L (ref 7–15)
AST SERPL W P-5'-P-CCNC: 28 U/L (ref 0–45)
BASOPHILS # BLD AUTO: 0.1 10E3/UL (ref 0–0.2)
BASOPHILS NFR BLD AUTO: 1 %
BILIRUB SERPL-MCNC: 0.8 MG/DL
BUN SERPL-MCNC: 21.4 MG/DL (ref 8–23)
CALCIUM SERPL-MCNC: 9.6 MG/DL (ref 8.8–10.4)
CHLORIDE SERPL-SCNC: 100 MMOL/L (ref 98–107)
CREAT SERPL-MCNC: 1.02 MG/DL (ref 0.67–1.17)
EGFRCR SERPLBLD CKD-EPI 2021: 76 ML/MIN/1.73M2
EOSINOPHIL # BLD AUTO: 0.2 10E3/UL (ref 0–0.7)
EOSINOPHIL NFR BLD AUTO: 3 %
ERYTHROCYTE [DISTWIDTH] IN BLOOD BY AUTOMATED COUNT: 13.5 % (ref 10–15)
GLUCOSE SERPL-MCNC: 100 MG/DL (ref 70–99)
HCO3 SERPL-SCNC: 25 MMOL/L (ref 22–29)
HCT VFR BLD AUTO: 40.5 % (ref 40–53)
HGB BLD-MCNC: 13.2 G/DL (ref 13.3–17.7)
IMM GRANULOCYTES # BLD: 0 10E3/UL
IMM GRANULOCYTES NFR BLD: 0 %
LIPASE SERPL-CCNC: 28 U/L (ref 13–60)
LYMPHOCYTES # BLD AUTO: 2 10E3/UL (ref 0.8–5.3)
LYMPHOCYTES NFR BLD AUTO: 28 %
MAGNESIUM SERPL-MCNC: 2.2 MG/DL (ref 1.7–2.3)
MCH RBC QN AUTO: 31.2 PG (ref 26.5–33)
MCHC RBC AUTO-ENTMCNC: 32.6 G/DL (ref 31.5–36.5)
MCV RBC AUTO: 96 FL (ref 78–100)
MONOCYTES # BLD AUTO: 0.5 10E3/UL (ref 0–1.3)
MONOCYTES NFR BLD AUTO: 7 %
NEUTROPHILS # BLD AUTO: 4.4 10E3/UL (ref 1.6–8.3)
NEUTROPHILS NFR BLD AUTO: 61 %
NRBC # BLD AUTO: 0 10E3/UL
NRBC BLD AUTO-RTO: 0 /100
PLATELET # BLD AUTO: 272 10E3/UL (ref 150–450)
POTASSIUM SERPL-SCNC: 4.6 MMOL/L (ref 3.4–5.3)
PROT SERPL-MCNC: 8.1 G/DL (ref 6.4–8.3)
RBC # BLD AUTO: 4.23 10E6/UL (ref 4.4–5.9)
SODIUM SERPL-SCNC: 137 MMOL/L (ref 135–145)
TROPONIN T SERPL HS-MCNC: 19 NG/L
WBC # BLD AUTO: 7.2 10E3/UL (ref 4–11)

## 2025-04-06 PROCEDURE — 258N000003 HC RX IP 258 OP 636: Performed by: EMERGENCY MEDICINE

## 2025-04-06 PROCEDURE — 99284 EMERGENCY DEPT VISIT MOD MDM: CPT | Performed by: EMERGENCY MEDICINE

## 2025-04-06 PROCEDURE — 96374 THER/PROPH/DIAG INJ IV PUSH: CPT | Performed by: EMERGENCY MEDICINE

## 2025-04-06 PROCEDURE — 80053 COMPREHEN METABOLIC PANEL: CPT | Performed by: EMERGENCY MEDICINE

## 2025-04-06 PROCEDURE — 96361 HYDRATE IV INFUSION ADD-ON: CPT | Performed by: EMERGENCY MEDICINE

## 2025-04-06 PROCEDURE — 83690 ASSAY OF LIPASE: CPT | Performed by: EMERGENCY MEDICINE

## 2025-04-06 PROCEDURE — 250N000011 HC RX IP 250 OP 636: Performed by: EMERGENCY MEDICINE

## 2025-04-06 PROCEDURE — 84075 ASSAY ALKALINE PHOSPHATASE: CPT | Performed by: EMERGENCY MEDICINE

## 2025-04-06 PROCEDURE — 96375 TX/PRO/DX INJ NEW DRUG ADDON: CPT | Performed by: EMERGENCY MEDICINE

## 2025-04-06 PROCEDURE — 83735 ASSAY OF MAGNESIUM: CPT | Performed by: EMERGENCY MEDICINE

## 2025-04-06 PROCEDURE — 85025 COMPLETE CBC W/AUTO DIFF WBC: CPT | Performed by: EMERGENCY MEDICINE

## 2025-04-06 PROCEDURE — 36415 COLL VENOUS BLD VENIPUNCTURE: CPT | Performed by: EMERGENCY MEDICINE

## 2025-04-06 PROCEDURE — 250N000009 HC RX 250: Performed by: EMERGENCY MEDICINE

## 2025-04-06 PROCEDURE — 84484 ASSAY OF TROPONIN QUANT: CPT | Performed by: EMERGENCY MEDICINE

## 2025-04-06 PROCEDURE — 250N000013 HC RX MED GY IP 250 OP 250 PS 637: Performed by: EMERGENCY MEDICINE

## 2025-04-06 PROCEDURE — 99284 EMERGENCY DEPT VISIT MOD MDM: CPT | Mod: 25 | Performed by: EMERGENCY MEDICINE

## 2025-04-06 RX ORDER — MAGNESIUM HYDROXIDE/ALUMINUM HYDROXICE/SIMETHICONE 120; 1200; 1200 MG/30ML; MG/30ML; MG/30ML
15 SUSPENSION ORAL ONCE
Status: COMPLETED | OUTPATIENT
Start: 2025-04-06 | End: 2025-04-06

## 2025-04-06 RX ORDER — METOCLOPRAMIDE HYDROCHLORIDE 5 MG/ML
5 INJECTION INTRAMUSCULAR; INTRAVENOUS ONCE
Status: COMPLETED | OUTPATIENT
Start: 2025-04-06 | End: 2025-04-06

## 2025-04-06 RX ORDER — LIDOCAINE HYDROCHLORIDE 20 MG/ML
10 SOLUTION OROPHARYNGEAL ONCE
Status: COMPLETED | OUTPATIENT
Start: 2025-04-06 | End: 2025-04-06

## 2025-04-06 RX ORDER — PANTOPRAZOLE SODIUM 40 MG/1
40 TABLET, DELAYED RELEASE ORAL DAILY
Qty: 30 TABLET | Refills: 0 | Status: SHIPPED | OUTPATIENT
Start: 2025-04-06 | End: 2025-04-09

## 2025-04-06 RX ADMIN — METOCLOPRAMIDE 5 MG: 5 INJECTION, SOLUTION INTRAMUSCULAR; INTRAVENOUS at 16:58

## 2025-04-06 RX ADMIN — SODIUM CHLORIDE 500 ML: 0.9 INJECTION, SOLUTION INTRAVENOUS at 16:55

## 2025-04-06 RX ADMIN — PANTOPRAZOLE SODIUM 40 MG: 40 INJECTION, POWDER, FOR SOLUTION INTRAVENOUS at 16:57

## 2025-04-06 RX ADMIN — ALUMINUM HYDROXIDE, MAGNESIUM HYDROXIDE, AND SIMETHICONE 15 ML: 1200; 120; 1200 SUSPENSION ORAL at 17:01

## 2025-04-06 RX ADMIN — LIDOCAINE HYDROCHLORIDE 10 ML: 20 SOLUTION ORAL at 17:00

## 2025-04-06 ASSESSMENT — COLUMBIA-SUICIDE SEVERITY RATING SCALE - C-SSRS
6. HAVE YOU EVER DONE ANYTHING, STARTED TO DO ANYTHING, OR PREPARED TO DO ANYTHING TO END YOUR LIFE?: NO
1. IN THE PAST MONTH, HAVE YOU WISHED YOU WERE DEAD OR WISHED YOU COULD GO TO SLEEP AND NOT WAKE UP?: NO
2. HAVE YOU ACTUALLY HAD ANY THOUGHTS OF KILLING YOURSELF IN THE PAST MONTH?: NO

## 2025-04-06 ASSESSMENT — ACTIVITIES OF DAILY LIVING (ADL)
ADLS_ACUITY_SCORE: 55

## 2025-04-06 NOTE — ED PROVIDER NOTES
"ED Provider Note  [facility] EMERGENCY DEPARTMENT ([facility])    4/06/25       ED PROVIDER NOTE     History     Chief Complaint   Patient presents with    Hiccups     HPI    Trevor Larios is a 74 year old male with a history of cognitive impairment/likely dementia, Crohn's disease, epilepsy on Keppra, previous aortic aneurysm status post endovascular repair in August 2024, who presents to the emergency department today complaining of hiccups.  Patient states that he has had hiccups for the past week.  He states he has tried everything he can think of to get rid of them, including holding his breath, drinking cold water.  He has tried various YouTube remedies without improvement.  He has tried Tums as well as he sometimes notices some reflux of acid into the back of his throat.  This has been ineffective for him.  He denies any fevers or chills, has had some nasal congestion, also endorses a sore throat secondary to frequent hiccuping.  He is eating and drinking however.  Occasionally feels short of breath secondary to the hiccups.  Has had a slight cough which is dry and nonproductive.  He does endorse chest pain as well as abdominal pain only with hiccuping.  Has had 1 episode of vomiting, slight diarrhea.    Patient does report that he had something similar last year, had hiccups for about a day before he had a seizure.  He is concerned that that may be happening again, but does not report that he has had any seizure or seizure-like activity recently.    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  Stenting of the celiac with 10x38 icast, SMA with 6x22 icast, right renal artery with 6x22 icast, left renal artery wiht 6x22 icast. Cone beam CT, rotational DSA, ultrasound guided bi    IR OR ANGIOGRAM  2024    ORIF HIP FRACTURE      SHOULDER SURGERY Right     ?    TOE SURGERY       pantoprazole (PROTONIX) 40 MG EC tablet  acetaminophen (TYLENOL) 500 MG tablet  aspirin (ASA) 81 MG chewable tablet  calcium carbonate (TUMS) 500 MG chewable tablet  cyanocobalamin (VITAMIN B-12) 500 MCG SUBL sublingual tablet  ferrous sulfate (FEROSUL) 325 (65 Fe) MG tablet  levETIRAcetam (KEPPRA) 1000 MG tablet  levETIRAcetam (KEPPRA) 750 MG tablet  loperamide (IMODIUM A-D) 2 MG tablet  midodrine (PROAMATINE) 5 MG tablet  multivitamin w/minerals (THERA-VIT-M) tablet  thiamine (B-1) 100 MG tablet  vitamin C (ASCORBIC ACID) 500 MG tablet  Vitamin D, Cholecalciferol, 25 MCG (1000 UT) CAPS      Allergies   Allergen Reactions    Sulfasalazine Fatigue and Headache     Family History  Family History   Problem Relation Age of Onset    Anesthesia Reaction No family hx of     Venous thrombosis No family hx of      Social History   Social History     Tobacco Use    Smoking status: Some Days     Current packs/day: 0.00     Types: Cigarettes     Last attempt to quit:      Years since quittin.2    Smokeless tobacco: Never    Tobacco comments:     Pt states he uses medical marijuana   Vaping Use    Vaping status: Never Used   Substance Use Topics    Alcohol use: Not Currently    Drug use: Not Currently      A  medically appropriate review of systems was performed with pertinent positives and negatives noted in the HPI, and all other systems negative.    Physical Exam   BP: 113/77  Pulse: 82  Temp: 98.3  F (36.8  C)  Resp: 16  SpO2: 97 %  Physical Exam  Vitals and nursing note reviewed.   Constitutional:       General: He is not in acute distress.     Appearance: He is not diaphoretic.      Comments:   Older adult male, sitting in wheelchair, very frequent hiccuping, every few seconds.   HENT:      Head: Atraumatic.      Mouth/Throat:      Mouth: Mucous membranes are moist.      Pharynx: Oropharynx is clear. No oropharyngeal exudate.   Eyes:      General: No scleral icterus.     Pupils: Pupils are equal, round, and reactive to light.   Cardiovascular:      Rate and Rhythm: Normal rate.      Pulses: Normal pulses.      Heart sounds: No murmur heard.  Pulmonary:      Effort: No respiratory distress.      Breath sounds: Normal breath sounds.   Abdominal:      General: Bowel sounds are normal.      Palpations: Abdomen is soft.      Tenderness: There is no abdominal tenderness.   Musculoskeletal:         General: No tenderness.   Skin:     General: Skin is warm.      Findings: No rash.   Neurological:      General: No focal deficit present.         ED Course, Procedures, & Data      Procedures               Results for orders placed or performed during the hospital encounter of 04/06/25   Comprehensive metabolic panel     Status: Abnormal   Result Value Ref Range    Sodium 137 135 - 145 mmol/L    Potassium 4.6 3.4 - 5.3 mmol/L    Carbon Dioxide (CO2) 25 22 - 29 mmol/L    Anion Gap 12 7 - 15 mmol/L    Urea Nitrogen 21.4 8.0 - 23.0 mg/dL    Creatinine 1.02 0.67 - 1.17 mg/dL    GFR Estimate 76 >60 mL/min/1.73m2    Calcium 9.6 8.8 - 10.4 mg/dL    Chloride 100 98 - 107 mmol/L    Glucose 100 (H) 70 - 99 mg/dL    Alkaline Phosphatase 78 40 - 150 U/L    AST 28 0 - 45 U/L    ALT 14 0 - 70 U/L    Protein Total 8.1 6.4 - 8.3 g/dL     Albumin 4.3 3.5 - 5.2 g/dL    Bilirubin Total 0.8 <=1.2 mg/dL   Magnesium     Status: Normal   Result Value Ref Range    Magnesium 2.2 1.7 - 2.3 mg/dL   Troponin T, High Sensitivity     Status: Normal   Result Value Ref Range    Troponin T, High Sensitivity 19 <=22 ng/L   Lipase     Status: Normal   Result Value Ref Range    Lipase 28 13 - 60 U/L   CBC with platelets and differential     Status: Abnormal   Result Value Ref Range    WBC Count 7.2 4.0 - 11.0 10e3/uL    RBC Count 4.23 (L) 4.40 - 5.90 10e6/uL    Hemoglobin 13.2 (L) 13.3 - 17.7 g/dL    Hematocrit 40.5 40.0 - 53.0 %    MCV 96 78 - 100 fL    MCH 31.2 26.5 - 33.0 pg    MCHC 32.6 31.5 - 36.5 g/dL    RDW 13.5 10.0 - 15.0 %    Platelet Count 272 150 - 450 10e3/uL    % Neutrophils 61 %    % Lymphocytes 28 %    % Monocytes 7 %    % Eosinophils 3 %    % Basophils 1 %    % Immature Granulocytes 0 %    NRBCs per 100 WBC 0 <1 /100    Absolute Neutrophils 4.4 1.6 - 8.3 10e3/uL    Absolute Lymphocytes 2.0 0.8 - 5.3 10e3/uL    Absolute Monocytes 0.5 0.0 - 1.3 10e3/uL    Absolute Eosinophils 0.2 0.0 - 0.7 10e3/uL    Absolute Basophils 0.1 0.0 - 0.2 10e3/uL    Absolute Immature Granulocytes 0.0 <=0.4 10e3/uL    Absolute NRBCs 0.0 10e3/uL   CBC with Platelets & Differential     Status: Abnormal    Narrative    The following orders were created for panel order CBC with Platelets & Differential.  Procedure                               Abnormality         Status                     ---------                               -----------         ------                     CBC with platelets and ...[[id number]]  Abnormal            Final result                 Please view results for these tests on the individual orders.     Medications   sodium chloride 0.9% BOLUS 500 mL (0 mLs Intravenous Stopped 4/6/25 1911)   alum & mag hydroxide-simethicone (MAALOX) suspension 15 mL (15 mLs Oral $Given 4/6/25 1701)   lidocaine (viscous) (XYLOCAINE) 2 % solution 10 mL (10 mLs Mouth/Throat  $Given 4/6/25 1700)   pantoprazole (PROTONIX) IV push injection 40 mg (40 mg Intravenous $Given 4/6/25 6338)   metoclopramide (REGLAN) injection 5 mg (5 mg Intravenous $Given 4/6/25 1799)     Labs Ordered and Resulted from Time of ED Arrival to Time of ED Departure   COMPREHENSIVE METABOLIC PANEL - Abnormal       Result Value    Sodium 137      Potassium 4.6      Carbon Dioxide (CO2) 25      Anion Gap 12      Urea Nitrogen 21.4      Creatinine 1.02      GFR Estimate 76      Calcium 9.6      Chloride 100      Glucose 100 (*)     Alkaline Phosphatase 78      AST 28      ALT 14      Protein Total 8.1      Albumin 4.3      Bilirubin Total 0.8     CBC WITH PLATELETS AND DIFFERENTIAL - Abnormal    WBC Count 7.2      RBC Count 4.23 (*)     Hemoglobin 13.2 (*)     Hematocrit 40.5      MCV 96      MCH 31.2      MCHC 32.6      RDW 13.5      Platelet Count 272      % Neutrophils 61      % Lymphocytes 28      % Monocytes 7      % Eosinophils 3      % Basophils 1      % Immature Granulocytes 0      NRBCs per 100 WBC 0      Absolute Neutrophils 4.4      Absolute Lymphocytes 2.0      Absolute Monocytes 0.5      Absolute Eosinophils 0.2      Absolute Basophils 0.1      Absolute Immature Granulocytes 0.0      Absolute NRBCs 0.0     MAGNESIUM - Normal    Magnesium 2.2     TROPONIN T, HIGH SENSITIVITY - Normal    Troponin T, High Sensitivity 19     LIPASE - Normal    Lipase 28       No orders to display          Critical care was not performed.     Medical Decision Making  The patient's presentation was of moderate complexity (an acute illness with systemic symptoms).    The patient's evaluation involved:  review of external note(s) from 1 sources (see separate area of note for details)  ordering and/or review of 3+ test(s) in this encounter (see separate area of note for details)    The patient's management necessitated moderate risk (prescription drug management including medications given in the ED).    Assessment & Plan    Patient  presents for the above complaints.  On my evaluation patient is alert, cooperative, very frequent hiccuping noted.  Vital signs are normal, patient is afebrile.    Differential diagnosis of hiccups could include any number of etiologies.  He does not have any fevers and is not reporting abdominal pain in the absence of hiccups (which I think is likely more muscular abdominal wall pain from muscular strain from frequent hiccups), though certainly there have been reports of intra-abdominal processes such as subphrenic abscesses, potentially causing hiccups.  We will evaluate for electrolyte disturbance or acute intra-abdominal pathology with labs.    With regard to his chest pain, suspect that this is also musculoskeletal +/- reflux due to ongoing hiccups.    We did establish IV access and we did draw blood for laboratory analysis.  CBC shows normal white count of 7.2, hemoglobin 13.2, normal platelet count, CMP is within normal limits, lipase is normal, troponin is at 19, magnesium is normal at 2.2.  Patient was given IV fluids as well as a dose of Protonix, 1 dose of Reglan 5 mg IV, and a GI cocktail.    After treatment with medications listed, patient has had complete resolution of hiccups and would like to be discharged.  He is feeling fine.  I will place him on an antacid/PPI for the next period of time, he should follow-up with his primary clinic if his symptoms are ongoing or return to the emergency department.  However he would definitely recommend he follow-up anyway with his primary clinic for recheck next week.  Patient and granddaughter verbalized understanding.    This part of the medical record was transcribed by Jeff Hebert Medical Scribe, from a dictation done by Amarilis Rangel MD.      I have reviewed the nursing notes. I have reviewed the findings, diagnosis, plan and need for follow up with the patient.    Discharge Medication List as of 4/6/2025  7:12 PM        START taking these  medications    Details   pantoprazole (PROTONIX) 40 MG EC tablet Take 1 tablet (40 mg) by mouth daily for 30 doses. Please take in the morning on empty stomach 30 minutes prior to eating or drinking, Disp-30 tablet, R-0, Local Print             Final diagnoses:   Hiccups   IJeff, am serving as a trained medical scribe to document services personally performed by Amarilis Rangel MD, based on the provider's statements to me.     IAmarilis MD, was physically present and have reviewed and verified the accuracy of this note documented by Jeff Hebert.     Amarilis Rangel MD  [facility] EMERGENCY DEPARTMENT  4/6/2025     Amarilis Rangel MD  04/06/25 2051

## 2025-04-06 NOTE — ED TRIAGE NOTES
PT arrives c/o hiccups for one week. Endorses SOB.     Triage Assessment (Adult)       Row Name 04/06/25 1610          Triage Assessment    Airway WDL WDL        Respiratory WDL    Respiratory WDL WDL        Skin Circulation/Temperature WDL    Skin Circulation/Temperature WDL WDL        Cardiac WDL    Cardiac WDL WDL        Peripheral/Neurovascular WDL    Peripheral Neurovascular WDL WDL        Cognitive/Neuro/Behavioral WDL    Cognitive/Neuro/Behavioral WDL WDL

## 2025-04-07 ENCOUNTER — PATIENT OUTREACH (OUTPATIENT)
Dept: FAMILY MEDICINE | Facility: CLINIC | Age: 75
End: 2025-04-07
Payer: COMMERCIAL

## 2025-04-07 NOTE — DISCHARGE INSTRUCTIONS
You have been seen in the emergency department today for your symptoms.  Your blood test look normal.  We have given you some medications which was fortunately effective in reducing your hiccups.  We advise that you take an antacid medication on a regular basis for at least the next month.  We have given you a prescription.  Please follow-up with your clinic.  Call on Monday to let them know that you need an ER follow-up visit.  Return to the emergency department for new or worsening symptoms.

## 2025-04-07 NOTE — TELEPHONE ENCOUNTER
Transitions of Care Outreach  No chief complaint on file.      Most Recent Admission Date: 4/6/2025   Most Recent Admission Diagnosis:      Most Recent Discharge Date: 4/6/2025   Most Recent Discharge Diagnosis: Hiccups - R06.6     Transitions of Care Assessment    Discharge Assessment  How are you doing now that you are home?: hiccups subisded for now but I had them this morning.    Follow up Plan          Future Appointments   Date Time Provider Department Center   6/24/2025 10:00 AM Errol Chapman MD [facility]       Outpatient Plan as outlined on AVS reviewed with patient.    For any urgent concerns, please contact our 24 hour nurse triage line: [phone])       Luciana Syed RN

## 2025-04-09 ENCOUNTER — OFFICE VISIT (OUTPATIENT)
Dept: FAMILY MEDICINE | Facility: CLINIC | Age: 75
End: 2025-04-09
Payer: COMMERCIAL

## 2025-04-09 VITALS
BODY MASS INDEX: 22.57 KG/M2 | HEIGHT: 68 IN | DIASTOLIC BLOOD PRESSURE: 66 MMHG | TEMPERATURE: 98.6 F | WEIGHT: 148.9 LBS | SYSTOLIC BLOOD PRESSURE: 111 MMHG | OXYGEN SATURATION: 99 % | HEART RATE: 75 BPM

## 2025-04-09 DIAGNOSIS — R06.6 HICCUPS: Primary | ICD-10-CM

## 2025-04-09 DIAGNOSIS — H92.01 RIGHT EAR PAIN: ICD-10-CM

## 2025-04-09 PROCEDURE — 3078F DIAST BP <80 MM HG: CPT

## 2025-04-09 PROCEDURE — 3074F SYST BP LT 130 MM HG: CPT

## 2025-04-09 PROCEDURE — 99214 OFFICE O/P EST MOD 30 MIN: CPT

## 2025-04-09 PROCEDURE — 1125F AMNT PAIN NOTED PAIN PRSNT: CPT

## 2025-04-09 RX ORDER — FLUTICASONE PROPIONATE 50 MCG
1 SPRAY, SUSPENSION (ML) NASAL DAILY
Qty: 16 G | Refills: 0 | Status: SHIPPED | OUTPATIENT
Start: 2025-04-09

## 2025-04-09 RX ORDER — PANTOPRAZOLE SODIUM 40 MG/1
40 TABLET, DELAYED RELEASE ORAL DAILY
Qty: 30 TABLET | Refills: 0 | Status: SHIPPED | OUTPATIENT
Start: 2025-04-09

## 2025-04-09 ASSESSMENT — PAIN SCALES - GENERAL: PAINLEVEL_OUTOF10: SEVERE PAIN (7)

## 2025-04-09 NOTE — PROGRESS NOTES
Assessment & Plan     (R06.6) Hiccups  (primary encounter diagnosis)  Comment: Acute and ongoing.  Hiccups have been persistent for about a week without significant improvement.  Has seen some improvement with pantoprazole use, but this is very intermittent.  Spent the majority of the time educating patient and caregiver that persistent hiccups can be caused by a number of different things.  They are specifically concerned about neurological abnormalities and potential stroke.  Patient is otherwise neurologically intact, and there are no concerns for neurological findings today that would make me feel as though hiccups are related to neurological etiology.  Considering that patient has had hiccups that preceded seizure activity prior to the initiation of Keppra, I do think that it is warranted for him to send a message to his neurology team to update them about his current concerns.  It seems much more likely that hiccups are related to a GI concern, especially because he is seeing symptom improvement with the pantoprazole use.  Additionally, he is experiencing some right-sided ear discomfort and epigastric pain, which points to some silent reflux as a cause of his concerns.  Would recommend Flonase, Ocean nasal spray, Zyrtec, and continued pantoprazole for management.  No concerns for cardiac etiology such as AAA, or myocarditis.  Not concerned for infectious etiology.  No concerns for esophageal perforation.  No findings today that would largely warrant the escalation in treatment, but of course if symptoms are not improving in the next 2 to 4 weeks with the current plan of care, we will need to reconsider further workup and evaluation. Offered education on medications including appropriate dosing, possible side effects, and possible adverse effects.  Education given on return to clinic instructions as well as alarm signs that would require the need for immediate medical attention.  Patient attested to  understanding.  Plan: fluticasone (FLONASE) 50 MCG/ACT nasal spray,         sodium chloride (OCEAN) 0.65 % nasal spray,         pantoprazole (PROTONIX) 40 MG EC tablet    (H92.01) Right ear pain  Comment: Acute and stable.  Ear discomfort seems associated with what is likely silent reflux.  Can utilize Flonase, Ocean nasal spray, and oral Zyrtec to manage irritation along with pantoprazole that will be most helpful to manage reflux type symptoms. Offered education on medications including appropriate dosing, possible side effects, and possible adverse effects.  Education given on return to clinic instructions as well as alarm signs that would require the need for immediate medical attention.  Patient attested to understanding.  Plan: fluticasone (FLONASE) 50 MCG/ACT nasal spray,         sodium chloride (OCEAN) 0.65 % nasal spray,         pantoprazole (PROTONIX) 40 MG EC tablet      This progress note has been dictated, with use of voice recognition software. Any grammatical, typographical, or context errors are unintentional and inherent to use of voice recognition software.     Prescription drug management  I spent a total of 30 minutes on the day of the visit.   Time spent by me today doing chart review, history and exam, documentation and further activities per the note    FUTURE APPOINTMENTS:       - Follow-up visit in 2 to 4 weeks and primary care if symptoms are not improving       - Make appointment with neurology specialist       - Follow-up for annual visit or as needed  Patient Instructions   Manual Maneuvers to Resolve Hiccups  -holding your breath for 5-10 seconds  -valsalva maneuver for 5-10 seconds  -sipping on cold water or a cold beverage  -biting on a lemon  -pulling on the tongue  -gently but firmly applying pressure to the eye socket with eyes closed  -sitting with the knees pulled into the chest for 1 minute    Occasionally, increased acid and gas in the stomach can result in persistent hiccups.  I would like you to begin taking Pantoprazole 40mg daily for the next 4 weeks. If you are not seeing improvement in your symptoms after that time, please follow-up with your PCP for further discussion about other ways to manage your symptoms.    The ear irritation is likely coming from irritaiton in your sinuses from silent reflux    You can take 10mg of zyrtec daily along with the items listed below    Flonase: I would like you to begin using Flonase once daily for the next week.  This medication will help with extra inflammation in your sinuses that will allow for other medications to work more effectively so that your sinuses can drain.  Please use 1 to 2 sprays in each nostril once daily.    Saline spray: I would like you to  with saline spray over-the-counter.  This works best when you lean slightly forward, insert the spray nozzle into your nostril, and direct the nozzle towards the opposite side of your face.  This is sometimes easier to be done in the shower over the sink as it can get a little bit messy.  You will know that you have done this correctly if your eyes slightly water after spraying the solution.  You can do this as prescribed on the box for as long as it takes to treat your symptoms.    Ibuprofen and Tylenol: You can take ibuprofen and Tylenol as prescribed on the box around the clock.  You can either take them on alternating schedules or you can take them together.  These medications work differently in your body, and are safe to be taken together.    Humidifier: Using a humidifier in the area that you sleep or taking a very hot shower to inhale some of the vaporized steam can help to open up your nasal passages and sinuses and encourage them to drain.    Rest: get adequate rest including 7-8 hours of sleep, and low activity levels during the day to encourage healing. Avoid high impact exercise and rigorous physical labor    Nutrition: support healing by fueling your body with healthy  foods. Fruits, vegetables, and whole foods are all great options!    Hydration: increase fluid intake. Illness leads to increased dehydration, so your body needs more fluid intake than it might when you are healthy. Brennan and sugar free teas are great options. Try to avoid beverages that cause more dehydration including coffee, soda, energy drinks, and alcohol.     As we discussed, if your symptoms do not improve or worsen over the next few days, please follow-up so that we can discuss next steps to help keep you comfortable      Subjective   Trevor is a 74 year old, presenting for the following health issues:  office visit (Pt reports they are here with hiccups lasting over a week, reports they are on and off. Both Ears feel clogged, Rt ear is worse, can hear ringing.  Voice is 'gravel'y. Went to ED on Sunday for hiccups and was given new prescription. Reports having difficulty eating, mainly soft foods like yogurt. Feeling like he can't swallow after a bout of the hiccups. Pt reports new med works but hiccups keep returning. )        4/9/2025     1:44 PM   Additional Questions   Roomed by Suzie   Accompanied by daughter         4/9/2025     1:44 PM   Patient Reported Additional Medications   Patient reports taking the following new medications none     History of Present Illness       Reason for visit:  Hicips  Symptom onset:  3-7 days ago  Symptom intensity:  Severe  Symptom progression:  Staying the same  Had these symptoms before:  Yes  Has tried/received treatment for these symptoms:  Yes  Previous treatment was successful:  No  What makes it worse:  Sitting up  What makes it better:  No   He is taking medications regularly.      Trevor is a 74-year-old male with a past medical history significant for seizure disorder and cognitive decline who presents today accompanied by his daughter and caregiver regarding ongoing hiccups.  Patient was seen in the emergency department on Sunday, April 6 with persistent  "hiccups for about a week.  Workup at that time included a CBC, CMP, mag, troponin, and lipase, which were all stable.  Patient and caregivers main concern with surrounding neurological causes of hiccups, as a bout of hiccups preceded patient's first episode of seizure activity prior to establishing care with neurology and being put on Keppra for management.  He has not been experiencing any neurological changes such as changes in his vision, loss of hearing, changes in the strength or sensation of his face or extremities, word finding difficulty, severe headaches, confusion, or episodes of syncope or seizure.  He continues to take his Keppra as previously prescribed.  He is experiencing some additional symptoms including right-sided ear discomfort and a mild ringing in his right ear, and difficulty eating, as the persistent hiccups make it a bit difficult to swallow.  He was prescribed pantoprazole in the emergency department, and has been taking this daily since it was prescribed.  He gets intermittent relief with this medication for about 2 to 3 hours, but hiccups are fairly persistent outside of that.  He declines any chest pain, shortness of breath, palpitations, nausea, vomiting, or diarrhea.  He does attest to some increased epigastric pain, but no severe worsening abdominal pain.  He declines the use of any new medications, and declines that the hiccups are surrounding any specific activity.      Review of Systems  Constitutional, HEENT, cardiovascular, pulmonary, gi and gu systems are negative, except as otherwise noted.      Objective    /66 (BP Location: Left arm, Patient Position: Sitting, Cuff Size: Adult Regular)   Pulse 75   Temp 98.6  F (37  C) (Temporal)   Ht 1.727 m (5' 8\")   Wt 67.5 kg (148 lb 14.4 oz)   SpO2 99%   BMI 22.64 kg/m    Body mass index is 22.64 kg/m .  Physical Exam   GENERAL: alert and no distress  EYES: Eyes grossly normal to inspection, PERRL and conjunctivae and sclerae " normal  HENT: ear canals and TM's normal, nose and mouth without ulcers or lesions  NECK: no adenopathy, no asymmetry, masses, or scars  RESP: lungs clear to auscultation - no rales, rhonchi or wheezes  CV: regular rate and rhythm, normal S1 S2, no S3 or S4, no murmur, click or rub, no peripheral edema  ABDOMEN: soft, nontender, no hepatosplenomegaly, no masses and bowel sounds normal  MS: no gross musculoskeletal defects noted, no edema  NEURO: Normal strength and tone, mentation intact and speech normal. CN II-XII grossly intact    Nat White DNP FNP-C  Family Nurse Practitioner - Same Day Provider  [facility] - [place]        Signed Electronically by: YEIMY Bernardo CNP

## 2025-04-09 NOTE — PATIENT INSTRUCTIONS
Manual Maneuvers to Resolve Hiccups  -holding your breath for 5-10 seconds  -valsalva maneuver for 5-10 seconds  -sipping on cold water or a cold beverage  -biting on a lemon  -pulling on the tongue  -gently but firmly applying pressure to the eye socket with eyes closed  -sitting with the knees pulled into the chest for 1 minute    Occasionally, increased acid and gas in the stomach can result in persistent hiccups. I would like you to begin taking Pantoprazole 40mg daily for the next 4 weeks. If you are not seeing improvement in your symptoms after that time, please follow-up with your PCP for further discussion about other ways to manage your symptoms.    The ear irritation is likely coming from irritaiton in your sinuses from silent reflux    You can take 10mg of zyrtec daily along with the items listed below    Flonase: I would like you to begin using Flonase once daily for the next week.  This medication will help with extra inflammation in your sinuses that will allow for other medications to work more effectively so that your sinuses can drain.  Please use 1 to 2 sprays in each nostril once daily.    Saline spray: I would like you to  with saline spray over-the-counter.  This works best when you lean slightly forward, insert the spray nozzle into your nostril, and direct the nozzle towards the opposite side of your face.  This is sometimes easier to be done in the shower over the sink as it can get a little bit messy.  You will know that you have done this correctly if your eyes slightly water after spraying the solution.  You can do this as prescribed on the box for as long as it takes to treat your symptoms.    Ibuprofen and Tylenol: You can take ibuprofen and Tylenol as prescribed on the box around the clock.  You can either take them on alternating schedules or you can take them together.  These medications work differently in your body, and are safe to be taken together.    Humidifier: Using a  humidifier in the area that you sleep or taking a very hot shower to inhale some of the vaporized steam can help to open up your nasal passages and sinuses and encourage them to drain.    Rest: get adequate rest including 7-8 hours of sleep, and low activity levels during the day to encourage healing. Avoid high impact exercise and rigorous physical labor    Nutrition: support healing by fueling your body with healthy foods. Fruits, vegetables, and whole foods are all great options!    Hydration: increase fluid intake. Illness leads to increased dehydration, so your body needs more fluid intake than it might when you are healthy. Brennan and sugar free teas are great options. Try to avoid beverages that cause more dehydration including coffee, soda, energy drinks, and alcohol.     As we discussed, if your symptoms do not improve or worsen over the next few days, please follow-up so that we can discuss next steps to help keep you comfortable

## 2025-04-10 NOTE — CONFIDENTIAL NOTE
NEUROLOGY - PRE VISIT PLANNING             Referring Provider Reason for Referral Office Visit Notes   Francie Santiago MD  Memory loss  3/4/2025        IMAGING/NOTES/SCANS Status/ Location  DATE   MRI/MRA(HEAD, NECK, SPINE) Internal  4/8/2024    CT/CTA   Internal   External - [facility] - PACS 4/24/2024,  4/7/2024 2/8/2024    EMG N/A    EEG Internal 4/8/2024 - 4/9/2024    LABS Internal    Neuropsychological testing  Internal  Harriet Landaverde PsyD, LP  1/28/2025   Additional Testing/Notes N/A      Does patient have C2C?  Year last updated Action     YES   [x]   2024   Please update at appointment if outdated more than 5 years       NO     []   N/A   Please complete C2C at appointment         
Detail Level: Simple

## 2025-04-14 DIAGNOSIS — I71.9 PSEUDOANEURYSM OF AORTA: ICD-10-CM

## 2025-04-14 DIAGNOSIS — I72.8 CELIAC ARTERY ANEURYSM: Primary | ICD-10-CM

## 2025-04-14 DIAGNOSIS — I71.40 ABDOMINAL AORTIC ANEURYSM (AAA) WITHOUT RUPTURE, UNSPECIFIED PART: ICD-10-CM

## 2025-04-14 DIAGNOSIS — Z98.890 POSTOPERATIVE STATE: ICD-10-CM

## 2025-04-18 ENCOUNTER — ANCILLARY PROCEDURE (OUTPATIENT)
Dept: CT IMAGING | Facility: CLINIC | Age: 75
End: 2025-04-18
Attending: SURGERY
Payer: COMMERCIAL

## 2025-04-18 DIAGNOSIS — I72.8 CELIAC ARTERY ANEURYSM: ICD-10-CM

## 2025-04-18 DIAGNOSIS — Z98.890 POSTOPERATIVE STATE: ICD-10-CM

## 2025-04-18 DIAGNOSIS — I71.40 ABDOMINAL AORTIC ANEURYSM (AAA) WITHOUT RUPTURE, UNSPECIFIED PART: ICD-10-CM

## 2025-04-18 DIAGNOSIS — I71.9 PSEUDOANEURYSM OF AORTA: ICD-10-CM

## 2025-04-18 PROCEDURE — 71275 CT ANGIOGRAPHY CHEST: CPT | Performed by: RADIOLOGY

## 2025-04-18 PROCEDURE — 74174 CTA ABD&PLVS W/CONTRAST: CPT | Performed by: RADIOLOGY

## 2025-04-18 RX ORDER — IOPAMIDOL 755 MG/ML
110 INJECTION, SOLUTION INTRAVASCULAR ONCE
Status: COMPLETED | OUTPATIENT
Start: 2025-04-18 | End: 2025-04-18

## 2025-04-18 RX ADMIN — IOPAMIDOL 110 ML: 755 INJECTION, SOLUTION INTRAVASCULAR at 14:54

## 2025-04-18 NOTE — DISCHARGE INSTRUCTIONS

## 2025-05-21 ENCOUNTER — OFFICE VISIT (OUTPATIENT)
Dept: VASCULAR SURGERY | Facility: CLINIC | Age: 75
End: 2025-05-21
Payer: COMMERCIAL

## 2025-05-21 VITALS
WEIGHT: 146.3 LBS | HEART RATE: 70 BPM | SYSTOLIC BLOOD PRESSURE: 173 MMHG | DIASTOLIC BLOOD PRESSURE: 97 MMHG | OXYGEN SATURATION: 98 % | BODY MASS INDEX: 22.24 KG/M2

## 2025-05-21 DIAGNOSIS — I71.40 ABDOMINAL AORTIC ANEURYSM (AAA) WITHOUT RUPTURE, UNSPECIFIED PART: ICD-10-CM

## 2025-05-21 DIAGNOSIS — Z98.890 POSTOPERATIVE STATE: ICD-10-CM

## 2025-05-21 DIAGNOSIS — I72.9 PSEUDOANEURYSM: ICD-10-CM

## 2025-05-21 DIAGNOSIS — I72.8 CELIAC ARTERY ANEURYSM: Primary | ICD-10-CM

## 2025-05-21 PROCEDURE — 1126F AMNT PAIN NOTED NONE PRSNT: CPT | Performed by: SURGERY

## 2025-05-21 PROCEDURE — 3080F DIAST BP >= 90 MM HG: CPT | Performed by: SURGERY

## 2025-05-21 PROCEDURE — 3077F SYST BP >= 140 MM HG: CPT | Performed by: SURGERY

## 2025-05-21 PROCEDURE — 99213 OFFICE O/P EST LOW 20 MIN: CPT | Performed by: SURGERY

## 2025-05-21 ASSESSMENT — PAIN SCALES - GENERAL: PAINLEVEL_OUTOF10: NO PAIN (0)

## 2025-05-21 NOTE — PATIENT INSTRUCTIONS
Thank you so much for choosing us for your care. It was a pleasure to see you at the vascular clinic today.     Follow-up recommendations:   - Your blood pressure was elevated today.  - You should start checking your blood pressure at home twice a day. Keep a log of your readings. If your systolic blood pressure (top number) is repeatedly above 140, please follow-up with your cardiologist or primary care provider.  - We will see you back in August. Please do not hesitate to reach out to us in the meantime with any concerns. Our schedulers will get in touch with you to set up your follow-up visit.    Additional testing/imaging ordered today: CTA chest/abdomen/pelvis      Our scheduling team will get in touch with you to set up any follow-up testing/imaging and/or appointments. Please be aware that any testing/imaging recommended today will need to completed prior to your next visit with the provider. If testing/imaging is not completed prior to your next visit, your visit may be rescheduled.     If you have any questions, please contact our clinic directly at [phone] and ask for the nurse. We also encourage the use of [employer] to communicate with your healthcare provider.    If you have an urgent need after business hours (8:00 am to 4:30 pm) please call [phone], option [phone], and ask for the vascular attending on call. For non-urgent after hours needs, please call the vascular clinic at [phone]. For scheduling needs, please call our clinic directly at [phone].    For additional patient education resources, please visit the Society of Vascular [employer] patient resources website at: [email or web address]

## 2025-05-21 NOTE — LETTER
"5/21/2025       RE: Trevor Larios  [address]  [facility] [address]     Dear Colleague,    Thank you for referring your patient, Trevor Larios, to the [facility]ASCULAR [facility] [place] [facility]. Please see a copy of my visit note below.    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  He is doing well with no complaints.  He has no abdominal or back pain.          Review of Systems:    A 12 point ROS was reviewed and is negative except for symptoms noted in HPI.     Medical/Surgical History:    Past Medical History:   Diagnosis Date     Anemia      Celiac artery aneurysm      Coronary artery calcification      Crohn's disease (H)      Dementia (H) 2023    \"moderate.\"     Seizure disorder (H)          Past Surgical History:   Procedure Laterality Date     BACKBENCH PREPARATION, GRAFT, FENESTRATED N/A 8/6/2024    Procedure: with physician modified endograft.;  Surgeon: Shine Sprague MD;  Location: [facility] OR     COLONOSCOPY       ENDOVASCULAR REPAIR ANEURYSM THORACIC AORTIC N/A 8/6/2024    Procedure: Endovascular repair of aortic and celiac pseudoaneurysm with 4 vessel " physician modified fenestrated-branched endograft with fenestrations for the celiac, superior mesenteric, and bilateral renal arteries.  Stenting of the celiac with 10x38 icast, SMA with 6x22 icast, right renal artery with 6x22 icast, left renal artery wiht 6x22 icast. [facility] beam CT, rotational DSA, ultrasound guided bi     IR OR ANGIOGRAM  8/6/2024     ORIF HIP FRACTURE  2022     SHOULDER SURGERY Right     2022?     TOE SURGERY          Allergies:     Allergies   Allergen Reactions     Sulfasalazine Fatigue and Headache        Medications:    Current Outpatient Medications   Medication Sig Dispense Refill     acetaminophen (TYLENOL) 500 MG tablet Take 2 tablets (1,000 mg) by mouth every 8 hours       aspirin (ASA) 81 MG chewable tablet Take 1 tablet (81 mg) by mouth every evening 90 tablet 0     calcium carbonate (TUMS) 500 MG chewable tablet Take 1 chew tab by mouth 2 times daily as needed for heartburn       cyanocobalamin (VITAMIN B-12) 500 MCG SUBL sublingual tablet Place 2 tablets (1,000 mcg) under the tongue daily 180 tablet 0     ferrous sulfate (FEROSUL) 325 (65 Fe) MG tablet Take 1 tablet (325 mg) by mouth daily 30 tablet 0     fluticasone (FLONASE) 50 MCG/ACT nasal spray Spray 1 spray into both nostrils daily. 16 g 0     levETIRAcetam (KEPPRA) 1000 MG tablet TAKE 1 TABLET(1000 MG) BY MOUTH EVERY EVENING 90 tablet 3     levETIRAcetam (KEPPRA) 750 MG tablet Take 1 tablet (750 mg) by mouth every morning. 90 tablet 1     loperamide (IMODIUM A-D) 2 MG tablet Take 1 tablet (2 mg) by mouth 4 times daily as needed for diarrhea 20 tablet 2     midodrine (PROAMATINE) 5 MG tablet Take 2 tablets (10 mg) by mouth 3 times daily 270 tablet 11     multivitamin w/minerals (THERA-VIT-M) tablet Take 1 tablet by mouth daily 30 tablet 0     pantoprazole (PROTONIX) 40 MG EC tablet Take 1 tablet (40 mg) by mouth daily. Please take in the morning on empty stomach 30 minutes prior to eating or drinking 30 tablet 0     sodium  chloride (OCEAN) 0.65 % nasal spray 1 spray in each nostril 4 times daily 60 mL 0     thiamine (B-1) 100 MG tablet Take 1 tablet (100 mg) by mouth daily 90 tablet 3     vitamin C (ASCORBIC ACID) 500 MG tablet Take 1 tablet (500 mg) by mouth daily. 90 tablet 1     Vitamin D, Cholecalciferol, 25 MCG (1000 UT) CAPS Take 1,000 mcg by mouth daily 90 capsule 3     No current facility-administered medications for this visit.        Social Habits:    Tobacco Use      Smoking status: Some Days        Packs/day: 0.00        Types: Cigarettes        Last attempt to quit:         Years since quittin.3      Smokeless tobacco: Never      Tobacco comments: Pt states he uses medical marijuana       Alcohol Use: Not on file       History   Drug Use Unknown        Family History:    Family History   Problem Relation Age of Onset     Anesthesia Reaction No family hx of      Venous thrombosis No family hx of        Physical Examination:  BP (!) 181/105 (BP Location: Left arm, Patient Position: Sitting, Cuff Size: Adult Regular)   Pulse 70   Wt 66.4 kg (146 lb 4.8 oz)   SpO2 98%   BMI 22.24 kg/m    Wt Readings from Last 1 Encounters:   25 66.4 kg (146 lb 4.8 oz)     Body mass index is 22.24 kg/m .    Exam:  General: No apparent distress. Answers questions appropriately   Neurologic: Focally intact, Alert and oriented x 3.   Eyes: Grossly normal.   Cardiac:  RRR by peripheral pulse   Pulmonary:  Non labored breathing with normal respiratory effort  Abdominal: Soft, non distended, non tender to palpation.   pulsatile mass.   Musculoskeletal/Extremity: 5/5 gross upper and lower extremity strength. No open wounds or abrasions.     Laboratory Findings:  Hemoglobin   Date Value Ref Range Status   2025 13.2 (L) 13.3 - 17.7 g/dL Final   2024 10.2 (L) 13.3 - 17.7 g/dL Final     WBC Count   Date Value Ref Range Status   2025 7.2 4.0 - 11.0 10e3/uL Final   2024 8.4 4.0 - 11.0 10e3/uL Final     Platelet  Count   Date Value Ref Range Status   04/06/2025 272 150 - 450 10e3/uL Final   08/09/2024 195 150 - 450 10e3/uL Final     Creatinine   Date Value Ref Range Status   04/06/2025 1.02 0.67 - 1.17 mg/dL Final     Sodium   Date Value Ref Range Status   04/06/2025 137 135 - 145 mmol/L Final     Glucose   Date Value Ref Range Status   04/06/2025 100 (H) 70 - 99 mg/dL Final   08/09/2024 95 70 - 99 mg/dL Final     GLUCOSE BY METER POCT   Date Value Ref Range Status   08/07/2024 80 70 - 99 mg/dL Final   08/07/2024 98 70 - 99 mg/dL Final     INR   Date Value Ref Range Status   08/09/2024 1.18 (H) 0.85 - 1.15 Final       Imaging:    I personally reviewed the CTA from 4/18/2025 and compared to that from 9/11/2024 which shows fenestrated stent graft with not evidence of type I, II, or III endoleaks. The pseudoaneurysm is thrombosed. His branch stents are widely patent without evidence of target vessel instability. No kink, fracture, or migration.     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 Diff infection, now treated  #9- Unspecified dementia with mild to moderate cognitive impairment (MOCA 14-21/30)  #10- Multiple visits the emergency room  Mr. 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  He is doing well. No stent graft complications.  We will plan to see him at the 1 year anniversary of the operation, sooner if issues arise.    Discussed warning signs including, but not limited to, symptomatic aneurysms, aortic rupture, cerebral/visceral/extremity  malperfusion, new chest, abdominal or back pain, hypotension with or without syncope/lightheadedness.  If he experiences any of these, he has been advised to seek treatment and contact my team.    Mr. Larios is in agreement with this plan as outlined.    Recommendations/Plan:  Return at 1 year anniversary of his operation with repeat CTA  Continue aspirin      Shine Sprague MD  Vascular & Endovascular Surgery       20 minutes spent on the day of encounter doing chart review from our system and care everywhere, history and exam, documentation, coordinating care, and further activities as noted with over half spent counseling.      HTN Management Nursing Note - Vascular    Does pt routinely check BP at home?: NO  If yes, what does BP typically run?: N/A  Follow-up recommendations: Recommended checking BP at least daily and follow-up with PCP if SBP >140 frequently.    Education provided by RN to pt regarding importance of HTN management, particularly in setting of vascular disease. Discussed instructions for checking BP at home and keeping a log. Relayed the above follow-up recommendations to patient.    Amita Peters RN  RN Care Coordinator - [facility] - [place]  Supporting Dr Sprague, Dr Jordan, Dr Moeller, & Yvette Alvarez CNP  Phone: [phone]  Fax: [phone]      Again, thank you for allowing me to participate in the care of your patient.      Sincerely,    Shine Sprague MD

## 2025-05-21 NOTE — PROGRESS NOTES
HTN Management Nursing Note - Vascular    Does pt routinely check BP at home?: NO  If yes, what does BP typically run?: N/A  Follow-up recommendations: Recommended checking BP at least daily and follow-up with PCP if SBP >140 frequently.    Education provided by RN to pt regarding importance of HTN management, particularly in setting of vascular disease. Discussed instructions for checking BP at home and keeping a log. Relayed the above follow-up recommendations to patient.    Amita Peters RN  RN Care Coordinator - [facility] - [place]  Supporting Dr Sprague, Dr Jordan, Dr Moeller, & Yvette Alvarez CNP  Phone: [phone]  Fax: [phone]

## 2025-05-21 NOTE — PROGRESS NOTES
"[facility] Return Visit    Vascular Surgeon: Shine Sprague MD, RPVI            Location:  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  He is doing well with no complaints.  He has no abdominal or back pain.          Review of Systems:    A 12 point ROS was reviewed and is negative except for symptoms noted in HPI.     Medical/Surgical History:    Past Medical History:   Diagnosis Date    Anemia     Celiac artery aneurysm     Coronary artery calcification     Crohn's disease (H)     Dementia (H) 2023    \"moderate.\"    Seizure disorder (H)          Past Surgical History:   Procedure Laterality Date    BACKBENCH PREPARATION, GRAFT, FENESTRATED N/A 8/6/2024    Procedure: with physician modified endograft.;  Surgeon: Shine Sprague MD;  Location: [facility] OR    COLONOSCOPY      ENDOVASCULAR REPAIR ANEURYSM THORACIC AORTIC N/A 8/6/2024    Procedure: 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  Stenting of the celiac with 10x38 icast, SMA with 6x22 icast, right renal artery with 6x22 icast, left renal artery wiht 6x22 icast. [facility] beam CT, rotational DSA, ultrasound guided bi "    IR OR ANGIOGRAM  8/6/2024    ORIF HIP FRACTURE  2022    SHOULDER SURGERY Right     2022?    TOE SURGERY          Allergies:     Allergies   Allergen Reactions    Sulfasalazine Fatigue and Headache        Medications:    Current Outpatient Medications   Medication Sig Dispense Refill    acetaminophen (TYLENOL) 500 MG tablet Take 2 tablets (1,000 mg) by mouth every 8 hours      aspirin (ASA) 81 MG chewable tablet Take 1 tablet (81 mg) by mouth every evening 90 tablet 0    calcium carbonate (TUMS) 500 MG chewable tablet Take 1 chew tab by mouth 2 times daily as needed for heartburn      cyanocobalamin (VITAMIN B-12) 500 MCG SUBL sublingual tablet Place 2 tablets (1,000 mcg) under the tongue daily 180 tablet 0    ferrous sulfate (FEROSUL) 325 (65 Fe) MG tablet Take 1 tablet (325 mg) by mouth daily 30 tablet 0    fluticasone (FLONASE) 50 MCG/ACT nasal spray Spray 1 spray into both nostrils daily. 16 g 0    levETIRAcetam (KEPPRA) 1000 MG tablet TAKE 1 TABLET(1000 MG) BY MOUTH EVERY EVENING 90 tablet 3    levETIRAcetam (KEPPRA) 750 MG tablet Take 1 tablet (750 mg) by mouth every morning. 90 tablet 1    loperamide (IMODIUM A-D) 2 MG tablet Take 1 tablet (2 mg) by mouth 4 times daily as needed for diarrhea 20 tablet 2    midodrine (PROAMATINE) 5 MG tablet Take 2 tablets (10 mg) by mouth 3 times daily 270 tablet 11    multivitamin w/minerals (THERA-VIT-M) tablet Take 1 tablet by mouth daily 30 tablet 0    pantoprazole (PROTONIX) 40 MG EC tablet Take 1 tablet (40 mg) by mouth daily. Please take in the morning on empty stomach 30 minutes prior to eating or drinking 30 tablet 0    sodium chloride (OCEAN) 0.65 % nasal spray 1 spray in each nostril 4 times daily 60 mL 0    thiamine (B-1) 100 MG tablet Take 1 tablet (100 mg) by mouth daily 90 tablet 3    vitamin C (ASCORBIC ACID) 500 MG tablet Take 1 tablet (500 mg) by mouth daily. 90 tablet 1    Vitamin D, Cholecalciferol, 25 MCG (1000 UT) CAPS Take 1,000 mcg by mouth daily 90  capsule 3     No current facility-administered medications for this visit.        Social Habits:    Tobacco Use      Smoking status: Some Days        Packs/day: 0.00        Types: Cigarettes        Last attempt to quit:         Years since quittin.3      Smokeless tobacco: Never      Tobacco comments: Pt states he uses medical marijuana       Alcohol Use: Not on file       History   Drug Use Unknown        Family History:    Family History   Problem Relation Age of Onset    Anesthesia Reaction No family hx of     Venous thrombosis No family hx of        Physical Examination:  BP (!) 181/105 (BP Location: Left arm, Patient Position: Sitting, Cuff Size: Adult Regular)   Pulse 70   Wt 66.4 kg (146 lb 4.8 oz)   SpO2 98%   BMI 22.24 kg/m    Wt Readings from Last 1 Encounters:   25 66.4 kg (146 lb 4.8 oz)     Body mass index is 22.24 kg/m .    Exam:  General: No apparent distress. Answers questions appropriately   Neurologic: Focally intact, Alert and oriented x 3.   Eyes: Grossly normal.   Cardiac:  RRR by peripheral pulse   Pulmonary:  Non labored breathing with normal respiratory effort  Abdominal: Soft, non distended, non tender to palpation.   pulsatile mass.   Musculoskeletal/Extremity: 5/5 gross upper and lower extremity strength. No open wounds or abrasions.     Laboratory Findings:  Hemoglobin   Date Value Ref Range Status   2025 13.2 (L) 13.3 - 17.7 g/dL Final   2024 10.2 (L) 13.3 - 17.7 g/dL Final     WBC Count   Date Value Ref Range Status   2025 7.2 4.0 - 11.0 10e3/uL Final   2024 8.4 4.0 - 11.0 10e3/uL Final     Platelet Count   Date Value Ref Range Status   2025 272 150 - 450 10e3/uL Final   2024 195 150 - 450 10e3/uL Final     Creatinine   Date Value Ref Range Status   2025 1.02 0.67 - 1.17 mg/dL Final     Sodium   Date Value Ref Range Status   2025 137 135 - 145 mmol/L Final     Glucose   Date Value Ref Range Status   2025 100 (H) 70  - 99 mg/dL Final   08/09/2024 95 70 - 99 mg/dL Final     GLUCOSE BY METER POCT   Date Value Ref Range Status   08/07/2024 80 70 - 99 mg/dL Final   08/07/2024 98 70 - 99 mg/dL Final     INR   Date Value Ref Range Status   08/09/2024 1.18 (H) 0.85 - 1.15 Final       Imaging:    I personally reviewed the CTA from 4/18/2025 and compared to that from 9/11/2024 which shows fenestrated stent graft with not evidence of type I, II, or III endoleaks. The pseudoaneurysm is thrombosed. His branch stents are widely patent without evidence of target vessel instability. No kink, fracture, or migration.     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 Diff infection, now treated  #9- Unspecified dementia with mild to moderate cognitive impairment (MOCA 14-21/30)  #10- Multiple visits the emergency room  Mr. 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  He is doing well. No stent graft complications.  We will plan to see him at the 1 year anniversary of the operation, sooner if issues arise.    Discussed warning signs including, but not limited to, symptomatic aneurysms, aortic rupture, cerebral/visceral/extremity malperfusion, new chest, abdominal or back pain, hypotension with or without syncope/lightheadedness.  If he experiences any of these, he has been advised to seek treatment and contact my team.    Mr. Larios is in agreement with this plan as outlined.    Recommendations/Plan:  Return at 1 year anniversary of his operation with repeat CTA  Continue  aspirin      Shine Sprague MD  Vascular & Endovascular Surgery       20 minutes spent on the day of encounter doing chart review from our system and care everywhere, history and exam, documentation, coordinating care, and further activities as noted with over half spent counseling.

## 2025-05-21 NOTE — NURSING NOTE
Chief Complaint   Patient presents with    Follow Up     5/21/2025 visit with Shine Sprague MD for RETURN VASCULAR PATIENT - DO NOT RESCHEDULE EVAR fullow up; CTA done April       Vitals were taken and medications reconciled.    Mikie Epstein, Visit Facilitator  4:19 PM

## 2025-06-07 DIAGNOSIS — R06.6 HICCUPS: ICD-10-CM

## 2025-06-07 DIAGNOSIS — H92.01 RIGHT EAR PAIN: ICD-10-CM

## 2025-06-09 RX ORDER — FLUTICASONE PROPIONATE 50 MCG
1 SPRAY, SUSPENSION (ML) NASAL DAILY
Qty: 16 G | Refills: 0 | Status: SHIPPED | OUTPATIENT
Start: 2025-06-09

## 2025-06-10 DIAGNOSIS — R06.6 HICCUPS: ICD-10-CM

## 2025-06-10 DIAGNOSIS — H92.01 RIGHT EAR PAIN: ICD-10-CM

## 2025-06-12 RX ORDER — PANTOPRAZOLE SODIUM 20 MG/1
20 TABLET, DELAYED RELEASE ORAL DAILY PRN
Qty: 90 TABLET | Refills: 0 | Status: SHIPPED | OUTPATIENT
Start: 2025-06-12

## 2025-06-12 NOTE — TELEPHONE ENCOUNTER
Spoke with patient who states he believes this has helped his hiccups. States he has been taking it as directed and is currently out.

## 2025-06-23 ENCOUNTER — TELEPHONE (OUTPATIENT)
Dept: NEUROLOGY | Facility: CLINIC | Age: 75
End: 2025-06-23
Payer: COMMERCIAL

## 2025-06-23 ENCOUNTER — MYC REFILL (OUTPATIENT)
Dept: NEUROLOGY | Facility: CLINIC | Age: 75
End: 2025-06-23
Payer: COMMERCIAL

## 2025-06-23 DIAGNOSIS — G40.909 SEIZURE DISORDER (H): ICD-10-CM
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
## 2025-06-23 NOTE — TELEPHONE ENCOUNTER
Attempted to reach patient to remind them about appointment scheduled with Errol Chapman MD on 6/24/25 in our [facility].    A voicemail was left with a call back number if the patient has questions or would like to reschedule.

## 2025-06-24 ENCOUNTER — PRE VISIT (OUTPATIENT)
Dept: NEUROLOGY | Facility: CLINIC | Age: 75
End: 2025-06-24

## 2025-06-24 ENCOUNTER — OFFICE VISIT (OUTPATIENT)
Dept: NEUROLOGY | Facility: CLINIC | Age: 75
End: 2025-06-24
Payer: COMMERCIAL

## 2025-06-24 VITALS — OXYGEN SATURATION: 95 % | DIASTOLIC BLOOD PRESSURE: 80 MMHG | HEART RATE: 84 BPM | SYSTOLIC BLOOD PRESSURE: 150 MMHG

## 2025-06-24 DIAGNOSIS — I95.89 OTHER SPECIFIED HYPOTENSION: Primary | ICD-10-CM

## 2025-06-24 DIAGNOSIS — R41.3 MEMORY LOSS: ICD-10-CM

## 2025-06-24 DIAGNOSIS — R63.6 UNDERWEIGHT: ICD-10-CM

## 2025-06-24 DIAGNOSIS — G40.909 SEIZURE DISORDER (H): ICD-10-CM

## 2025-06-24 PROCEDURE — G2211 COMPLEX E/M VISIT ADD ON: HCPCS | Performed by: STUDENT IN AN ORGANIZED HEALTH CARE EDUCATION/TRAINING PROGRAM

## 2025-06-24 PROCEDURE — 99215 OFFICE O/P EST HI 40 MIN: CPT | Performed by: STUDENT IN AN ORGANIZED HEALTH CARE EDUCATION/TRAINING PROGRAM

## 2025-06-24 PROCEDURE — 3079F DIAST BP 80-89 MM HG: CPT | Performed by: STUDENT IN AN ORGANIZED HEALTH CARE EDUCATION/TRAINING PROGRAM

## 2025-06-24 PROCEDURE — 3077F SYST BP >= 140 MM HG: CPT | Performed by: STUDENT IN AN ORGANIZED HEALTH CARE EDUCATION/TRAINING PROGRAM

## 2025-06-24 RX ORDER — MIDODRINE HYDROCHLORIDE 5 MG/1
10 TABLET ORAL 3 TIMES DAILY
Qty: 270 TABLET | Refills: 2 | Status: SHIPPED | OUTPATIENT
Start: 2025-06-24

## 2025-06-24 RX ORDER — LEVETIRACETAM 750 MG/1
750 TABLET ORAL EVERY MORNING
Qty: 90 TABLET | Refills: 1 | OUTPATIENT
Start: 2025-06-24

## 2025-06-24 RX ORDER — LEVETIRACETAM 750 MG/1
750 TABLET ORAL EVERY MORNING
Qty: 90 TABLET | Refills: 3 | Status: SHIPPED | OUTPATIENT
Start: 2025-06-24

## 2025-06-24 RX ORDER — LEVETIRACETAM 1000 MG/1
1000 TABLET ORAL AT BEDTIME
Qty: 90 TABLET | Refills: 3 | Status: SHIPPED | OUTPATIENT
Start: 2025-06-24

## 2025-06-24 NOTE — PATIENT INSTRUCTIONS
Stay physically and mentally active    No change to Keppra 750 in AM 1000 mg at night    Give refill of your midodrine, long term to primary care    Keep it up with vitamins     If worsening let me know  --blood work, neuropsychological testing, other testing
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
## 2025-06-24 NOTE — NURSING NOTE
"Trevor Larios is a 74 year old male who presents for:  Chief Complaint   Patient presents with    Memory Loss     Referred by Francie Santiago MD        Initial Vitals:  BP (!) 150/80   Pulse 84   SpO2 95%  Estimated body mass index is 22.24 kg/m  as calculated from the following:    Height as of 4/9/25: 1.727 m (5' 8\").    Weight as of 5/21/25: 66.4 kg (146 lb 4.8 oz).. There is no height or weight on file to calculate BSA. BP completed using cuff size: humberto Acuña    "

## 2025-08-20 DIAGNOSIS — H92.01 RIGHT EAR PAIN: ICD-10-CM

## 2025-08-20 DIAGNOSIS — R06.6 HICCUPS: ICD-10-CM

## 2025-08-20 RX ORDER — FLUTICASONE PROPIONATE 50 MCG
1 SPRAY, SUSPENSION (ML) NASAL DAILY
Qty: 16 G | Refills: 0 | Status: SHIPPED | OUTPATIENT
Start: 2025-08-20

## (undated) DEVICE — BLANKET BAIR HUGGER UNDERBODY AU63500

## (undated) DEVICE — PITCHER STERILE 1000ML  SSK9004A

## (undated) DEVICE — LINEN TOWEL PACK X5 5464

## (undated) DEVICE — CATH ANGION PIGTAIL ACCU-VU 5FRX70CM SIZING 13709801

## (undated) DEVICE — TUBING MEDRAD 48" HIGH PRESSURE MX694

## (undated) DEVICE — CLOSURE DEVICE 6FR VASC PROGLIDE MEDICATED SUTURE 12673-03

## (undated) DEVICE — KIT MICRO-INTRODUCER STIFFEN 4FR 7274V

## (undated) DEVICE — Device

## (undated) DEVICE — LABEL MEDICATION SYSTEM 3303-P

## (undated) DEVICE — NDL SPINAL 22GA 3.5" QUINCKE 405181

## (undated) DEVICE — SU MONOCRYL 4-0 PS-2 27" UND Y426H

## (undated) DEVICE — GLOVE BIOGEL PI MICRO SZ 8.0 48580

## (undated) DEVICE — SU PROLENE 3-0 RB-1DA 36"  8558H

## (undated) DEVICE — CATH ANGIO MARINER KUMPE 5FRX100CM 11732703

## (undated) DEVICE — IOM SUPPLIES/CASE FEE

## (undated) DEVICE — WIRE GUIDE LUNDERQUIST 0.035"X260CM DBL CVD

## (undated) DEVICE — INTRO SHEATH BRITE TIP 6FRX11CM 401-611M

## (undated) DEVICE — DRAPE MAYO STAND 23X54 8337

## (undated) DEVICE — ESU EYE LOW TEMP AA17

## (undated) DEVICE — SHEATH INTRODUCER DRYSEAL FLEX W/HYDRO CT 18FRX33CM DSF1833

## (undated) DEVICE — CELL SAVER

## (undated) DEVICE — SOL NACL 0.9% INJ 1000ML BAG 2B1324X

## (undated) DEVICE — DRAPE CVARTS 60"X100" 89454

## (undated) DEVICE — WIRE GUIDE LUNDERQUIST .035X260CM G31453

## (undated) DEVICE — GUIDEWIRE ROSEN CVD .035X260CM G01253 THSCF-35-260-1.5-ROSEN

## (undated) DEVICE — SU DERMABOND ADVANCED .7ML DNX12

## (undated) DEVICE — LINEN GOWN XLG 5407

## (undated) DEVICE — SYR BULB IRRIG DOVER 60 ML LATEX FREE 67000

## (undated) DEVICE — SU PROLENE 5-0 C-1DA 36" 8720H

## (undated) DEVICE — RX SURGIFLO HEMOSTATIC MATRIX W/THROMBIN 8ML 2994

## (undated) DEVICE — CATH BALLOON DILATION MUSTANG 10X20X75CM H74939171100270

## (undated) DEVICE — PACK SET-UP STD 9102

## (undated) DEVICE — DRAPE SHEET REV FOLD 3/4 9349

## (undated) DEVICE — SU ETHIBOND 5-0 RB-1DA 30" X550H

## (undated) DEVICE — DRAPE IOBAN INCISE 23X17" 6650EZ

## (undated) DEVICE — DRAPE IOBAN INCISE 36X23" 6651EZ

## (undated) DEVICE — SHEATH INTRODUCER DRYSEAL FLEX W/HYDRO CT 20FRX33CM DSF2033

## (undated) DEVICE — CATH BALLOON DILATION MUSTANG 6X20X75CM H74939171060270

## (undated) DEVICE — LINEN TOWEL PACK X30 5481

## (undated) DEVICE — TOURNIQUET VASCULAR KIT 7 1/2" 79012

## (undated) DEVICE — SU PROLENE 3-0 SH-1 30" 8762H

## (undated) DEVICE — PREP CHLORAPREP 26ML TINTED HI-LITE ORANGE 930815

## (undated) DEVICE — LINEN TOWEL PACK X6 WHITE 5487

## (undated) DEVICE — INTRO SHEATH BRITE TIP 7FRX11CM 401-711M

## (undated) DEVICE — NDL COUNTER 20CT 31142493

## (undated) DEVICE — GLIDEWIRE TERUMO RADIOFOCUS .035X260CM ANG EXCHANGE GR3509

## (undated) DEVICE — DECANTER BAG 2002S

## (undated) DEVICE — INTRO TERUMO 8FRX25CM W/MARKER RSB803

## (undated) DEVICE — INFLATION DEVICE ENCORE  26 PRESSURE GAUGE M001151050

## (undated) RX ORDER — METOPROLOL TARTRATE 1 MG/ML
INJECTION, SOLUTION INTRAVENOUS
Status: DISPENSED
Start: 2024-07-18

## (undated) RX ORDER — CEFAZOLIN SODIUM/WATER 2 G/20 ML
SYRINGE (ML) INTRAVENOUS
Status: DISPENSED
Start: 2024-08-06

## (undated) RX ORDER — FENTANYL CITRATE 50 UG/ML
INJECTION, SOLUTION INTRAMUSCULAR; INTRAVENOUS
Status: DISPENSED
Start: 2024-08-06

## (undated) RX ORDER — IODIXANOL 320 MG/ML
INJECTION, SOLUTION INTRAVASCULAR
Status: DISPENSED
Start: 2024-08-06

## (undated) RX ORDER — ATROPINE SULFATE 0.4 MG/ML
AMPUL (ML) INJECTION
Status: DISPENSED
Start: 2024-07-18

## (undated) RX ORDER — HEPARIN SODIUM 1000 [USP'U]/ML
INJECTION, SOLUTION INTRAVENOUS; SUBCUTANEOUS
Status: DISPENSED
Start: 2024-08-06

## (undated) RX ORDER — ONDANSETRON 2 MG/ML
INJECTION INTRAMUSCULAR; INTRAVENOUS
Status: DISPENSED
Start: 2024-08-06

## (undated) RX ORDER — PROPOFOL 10 MG/ML
INJECTION, EMULSION INTRAVENOUS
Status: DISPENSED
Start: 2024-08-06

## (undated) RX ORDER — LABETALOL HYDROCHLORIDE 5 MG/ML
INJECTION, SOLUTION INTRAVENOUS
Status: DISPENSED
Start: 2024-08-06

## (undated) RX ORDER — DOBUTAMINE HYDROCHLORIDE 200 MG/100ML
INJECTION INTRAVENOUS
Status: DISPENSED
Start: 2024-07-18

## (undated) RX ORDER — FENTANYL CITRATE-0.9 % NACL/PF 10 MCG/ML
PLASTIC BAG, INJECTION (ML) INTRAVENOUS
Status: DISPENSED
Start: 2024-08-06

## (undated) RX ORDER — DEXAMETHASONE SODIUM PHOSPHATE 4 MG/ML
INJECTION, SOLUTION INTRA-ARTICULAR; INTRALESIONAL; INTRAMUSCULAR; INTRAVENOUS; SOFT TISSUE
Status: DISPENSED
Start: 2024-08-06

## (undated) RX ORDER — CEFAZOLIN SODIUM 1 G/3ML
INJECTION, POWDER, FOR SOLUTION INTRAMUSCULAR; INTRAVENOUS
Status: DISPENSED
Start: 2024-08-06

## (undated) RX ORDER — ACETAMINOPHEN 325 MG/1
TABLET ORAL
Status: DISPENSED
Start: 2024-08-06